# Patient Record
Sex: MALE | Race: WHITE | NOT HISPANIC OR LATINO | Employment: FULL TIME | URBAN - METROPOLITAN AREA
[De-identification: names, ages, dates, MRNs, and addresses within clinical notes are randomized per-mention and may not be internally consistent; named-entity substitution may affect disease eponyms.]

---

## 2022-09-14 ENCOUNTER — OFFICE VISIT (OUTPATIENT)
Dept: FAMILY MEDICINE | Facility: CLINIC | Age: 62
End: 2022-09-14
Payer: COMMERCIAL

## 2022-09-14 ENCOUNTER — TELEPHONE (OUTPATIENT)
Dept: NURSING | Facility: CLINIC | Age: 62
End: 2022-09-14

## 2022-09-14 VITALS
SYSTOLIC BLOOD PRESSURE: 173 MMHG | WEIGHT: 315 LBS | TEMPERATURE: 98.8 F | OXYGEN SATURATION: 94 % | DIASTOLIC BLOOD PRESSURE: 96 MMHG | RESPIRATION RATE: 20 BRPM | HEART RATE: 55 BPM

## 2022-09-14 DIAGNOSIS — L72.3 INFECTED SEBACEOUS CYST: Primary | ICD-10-CM

## 2022-09-14 DIAGNOSIS — L08.9 INFECTED SEBACEOUS CYST: Primary | ICD-10-CM

## 2022-09-14 PROCEDURE — 10060 I&D ABSCESS SIMPLE/SINGLE: CPT | Performed by: FAMILY MEDICINE

## 2022-09-14 RX ORDER — AMLODIPINE BESYLATE 10 MG/1
TABLET ORAL
COMMUNITY
Start: 2022-05-02 | End: 2022-09-28

## 2022-09-14 RX ORDER — ATENOLOL 50 MG/1
TABLET ORAL
COMMUNITY
Start: 2022-08-08 | End: 2022-09-28

## 2022-09-14 RX ORDER — CEPHALEXIN 500 MG/1
500 TABLET ORAL 3 TIMES DAILY
Qty: 21 TABLET | Refills: 0 | Status: SHIPPED | OUTPATIENT
Start: 2022-09-14 | End: 2022-09-21

## 2022-09-14 RX ORDER — ATORVASTATIN CALCIUM 80 MG/1
TABLET, FILM COATED ORAL
COMMUNITY
Start: 2022-09-06 | End: 2022-09-28

## 2022-09-14 RX ORDER — LISINOPRIL 40 MG/1
TABLET ORAL
COMMUNITY
Start: 2022-08-08 | End: 2022-09-28

## 2022-09-14 NOTE — PATIENT INSTRUCTIONS
Change bandage once a day over the next 3 to 4 days then as needed.  We will continue to drain.  You may discontinue dressing once drainage has slowed down and wound is healed.  Follow-up with worsening redness, swelling, fevers, chills, etc.

## 2022-09-14 NOTE — PROGRESS NOTES
ASSESSMENT:  Sebaceous cyst with abscess formation.  Scription given for cephalexin given the degree of erythema and induration.    PLAN:  After informed consent was obtained, using Betadine for cleansing   and 1% Lidocaine  with epinephrine for anesthetic, with sterile   technique, an incision was made into the abscess cavity which was   then drained of foul-smelling sebaceous material.  Very little purulence.  The cavity was irrigated.  Culture not obtained. Procedure well   tolerated.  Dressing applied and wound care instructions provided.       SUBJECTIVE:  61 year old male presents with abscess formation on his back for 5 or 6 days.  He is not diabetic.  No   fever or chills.     OBJECTIVE:  Fluctuant abscess noted on the upper back size 5 cm. There is   surrounding induration, erythema and tenderness. Afebrile.

## 2022-09-14 NOTE — TELEPHONE ENCOUNTER
Patient calling reports he has a sebaceous cyst on back that is itchy and tender. Requesting to know if there is an urgent care that could see him today. Reviewed the [place] walk in clinic with patient to go here today.     Raven Zapata RN 09/14/22 12:41 PM   [facility] Triage Nurse Advisor

## 2022-09-28 ENCOUNTER — OFFICE VISIT (OUTPATIENT)
Dept: FAMILY MEDICINE | Facility: CLINIC | Age: 62
End: 2022-09-28
Payer: COMMERCIAL

## 2022-09-28 VITALS
DIASTOLIC BLOOD PRESSURE: 100 MMHG | RESPIRATION RATE: 16 BRPM | OXYGEN SATURATION: 97 % | TEMPERATURE: 98.1 F | WEIGHT: 315 LBS | SYSTOLIC BLOOD PRESSURE: 164 MMHG | HEART RATE: 58 BPM

## 2022-09-28 DIAGNOSIS — Z11.59 NEED FOR HEPATITIS C SCREENING TEST: ICD-10-CM

## 2022-09-28 DIAGNOSIS — G89.29 CHRONIC PAIN OF RIGHT KNEE: ICD-10-CM

## 2022-09-28 DIAGNOSIS — H61.23 BILATERAL IMPACTED CERUMEN: ICD-10-CM

## 2022-09-28 DIAGNOSIS — I10 ESSENTIAL HYPERTENSION: ICD-10-CM

## 2022-09-28 DIAGNOSIS — Z13.220 SCREENING FOR HYPERLIPIDEMIA: ICD-10-CM

## 2022-09-28 DIAGNOSIS — L72.3 SEBACEOUS CYST: ICD-10-CM

## 2022-09-28 DIAGNOSIS — Z76.89 ESTABLISHING CARE WITH NEW DOCTOR, ENCOUNTER FOR: Primary | ICD-10-CM

## 2022-09-28 DIAGNOSIS — Z11.4 SCREENING FOR HIV (HUMAN IMMUNODEFICIENCY VIRUS): ICD-10-CM

## 2022-09-28 DIAGNOSIS — M25.561 CHRONIC PAIN OF RIGHT KNEE: ICD-10-CM

## 2022-09-28 DIAGNOSIS — E78.2 MIXED HYPERLIPIDEMIA: ICD-10-CM

## 2022-09-28 LAB
CHOLEST SERPL-MCNC: 180 MG/DL
ERYTHROCYTE [DISTWIDTH] IN BLOOD BY AUTOMATED COUNT: 13.9 % (ref 10–15)
HBA1C MFR BLD: 5.6 % (ref 0–5.6)
HCT VFR BLD AUTO: 40.8 % (ref 40–53)
HDLC SERPL-MCNC: 25 MG/DL
HGB BLD-MCNC: 14 G/DL (ref 13.3–17.7)
LDLC SERPL CALC-MCNC: 133 MG/DL
MCH RBC QN AUTO: 28.6 PG (ref 26.5–33)
MCHC RBC AUTO-ENTMCNC: 34.3 G/DL (ref 31.5–36.5)
MCV RBC AUTO: 83 FL (ref 78–100)
NONHDLC SERPL-MCNC: 155 MG/DL
PLATELET # BLD AUTO: 350 10E3/UL (ref 150–450)
PSA SERPL-MCNC: 0.5 NG/ML (ref 0–3.5)
RBC # BLD AUTO: 4.89 10E6/UL (ref 4.4–5.9)
TRIGL SERPL-MCNC: 110 MG/DL
WBC # BLD AUTO: 9.4 10E3/UL (ref 4–11)

## 2022-09-28 PROCEDURE — 87389 HIV-1 AG W/HIV-1&-2 AB AG IA: CPT

## 2022-09-28 PROCEDURE — G0103 PSA SCREENING: HCPCS

## 2022-09-28 PROCEDURE — 86803 HEPATITIS C AB TEST: CPT

## 2022-09-28 PROCEDURE — 83036 HEMOGLOBIN GLYCOSYLATED A1C: CPT

## 2022-09-28 PROCEDURE — 85027 COMPLETE CBC AUTOMATED: CPT

## 2022-09-28 PROCEDURE — 36415 COLL VENOUS BLD VENIPUNCTURE: CPT

## 2022-09-28 PROCEDURE — 80061 LIPID PANEL: CPT

## 2022-09-28 PROCEDURE — 69209 REMOVE IMPACTED EAR WAX UNI: CPT

## 2022-09-28 PROCEDURE — 99214 OFFICE O/P EST MOD 30 MIN: CPT | Mod: 25

## 2022-09-28 RX ORDER — AMLODIPINE BESYLATE 10 MG/1
10 TABLET ORAL DAILY
Qty: 90 TABLET | Refills: 3 | Status: SHIPPED | OUTPATIENT
Start: 2022-09-28 | End: 2024-06-14

## 2022-09-28 RX ORDER — ATENOLOL 50 MG/1
50 TABLET ORAL DAILY
Qty: 90 TABLET | Refills: 2 | Status: SHIPPED | OUTPATIENT
Start: 2022-09-28 | End: 2023-07-24

## 2022-09-28 RX ORDER — ATORVASTATIN CALCIUM 80 MG/1
80 TABLET, FILM COATED ORAL DAILY
Qty: 90 TABLET | Refills: 2 | Status: SHIPPED | OUTPATIENT
Start: 2022-09-28 | End: 2023-09-08

## 2022-09-28 RX ORDER — LISINOPRIL 40 MG/1
40 TABLET ORAL DAILY
Qty: 90 TABLET | Refills: 2 | Status: SHIPPED | OUTPATIENT
Start: 2022-09-28 | End: 2023-07-24

## 2022-09-28 ASSESSMENT — ENCOUNTER SYMPTOMS
ABDOMINAL DISTENTION: 0
FEVER: 0
PARESTHESIAS: 0
WHEEZING: 0
DYSURIA: 0
FATIGUE: 0
NUMBNESS: 0
WEAKNESS: 0
SHORTNESS OF BREATH: 0
ABDOMINAL PAIN: 0
HEMATURIA: 0
DIZZINESS: 0
HEMATOCHEZIA: 0
TREMORS: 0
CONSTIPATION: 0
VOMITING: 0
NAUSEA: 0
COUGH: 0
HEADACHES: 0
CONFUSION: 0
DIARRHEA: 0

## 2022-09-28 NOTE — PROGRESS NOTES
Assessment & Plan     Establishing care with a new doctor, encounter for  Patient has not had preventative care physical in over a year.  Previously followed with clinic in [place].  Will get request signed by patient for his records.  He is overdue for colonoscopy screening per our records but he reports he did the Cologuard 2 years ago I will watch for these in the records but notified patient if I do not see these he may need to do the Cologuard again and then could repeat in 3 years from this year.  He cannot remember the last time he had hemoglobin A1c check he has not been to the eye doctor in quite a few years and has not had any blood work completed in the last year.  I discussed risks and benefits of PSA screening with him and he would like to have this done today.  - CBC with platelets  - Hemoglobin A1c  - Adult Eye [facility] Referral  - PSA, screen  PSA within normal limits: 0.50 no further workup  Hemoglobin A1C: 5.6 no further work up but did include in support staff message to notify patient that prediabetes is 5.7 and the diet and exercise changes we talked about are very important to try and prevent prediabetes/diabetes in the future.     Screening for HIV (human immunodeficiency virus)  He does not ever recall having an HIV screening completed, will complete today.  - HIV Antigen Antibody Combo    Need for hepatitis C screening test  Does not recall having this screening completed will complete today.  - Hepatitis C Screen Reflex to HCV RNA Quant and Genotype    Mixed hyperlipidemia  Screening for hyperlipidemia  He is on atorvastatin 80 mg daily as prescribed by previous provider.  He reports he does not know the last time he had his lipids checked.  We will check those today.  He gets minimal exercise and does not have a lot of fruits and vegetables in his diet.  We discussed trying to get walking more and trying to make better food choices.   - Lipid panel reflex to direct LDL  Non-fasting  Lipid Results:  Recent Labs   Lab Test 09/28/22  1623   CHOL 180   HDL 25*   *   TRIG 110     The 10-year ASCVD risk score (Sanjana TRUONG Jr., et al., 2013) is: 20%    Values used to calculate the score:      Age: 61 years      Sex: Male      Is Non- : No      Diabetic: No      Tobacco smoker: No      Systolic Blood Pressure: 164 mmHg      Is BP treated: Yes      HDL Cholesterol: 25 mg/dL      Total Cholesterol: 180 mg/dL    Essential hypertension  Previously diagnosed by outside provider today his blood pressure is 164/101, 160/98 on recheck.  He does note he ran out of his amlodipine 2 weeks ago and has not been taking it.  He is not having headaches, no chest pain, no vision changes, or other signs of uncontrolled hypertension at this time.  I did place follow-up order for nurse only visit to recheck his blood pressure in 2 weeks.  He will start on the amlodipine today and we will not adjust medications if needed if his blood pressure is still elevated at that appointment.  - amLODIPine (NORVASC) 10 MG tablet  Dispense: 90 tablet; Refill: 3  - atenolol (TENORMIN) 50 MG tablet  Dispense: 90 tablet; Refill: 2  - atorvastatin (LIPITOR) 80 MG tablet  Dispense: 90 tablet; Refill: 2  - lisinopril (ZESTRIL) 40 MG tablet  Dispense: 90 tablet; Refill: 2    Chronic pain of right knee  Patient reports he has osteoarthritis of the right knee that he takes daily ibuprofen and Tylenol around-the-clock for.  He has not had imaging for several years and does not wish to pursue any treatment options at this time.  However I discussed that taking ibuprofen around-the-clock every day has risks and there are less invasive options besides surgery such as steroid injections he could try.  He is willing to try this but would like to get another image of the knee to see if the osteoarthritis has progressed.   - XR Knee Right 3 Views    Bilateral impacted cerumen  On exam noted impacted cerumen  bilaterally.  Unable to see tympanic membranes.   -LA REMOVAL IMPACTED CERUMEN/LVG UNILATERAL  Ears were irrigated by support staff at clinic. I examined the canals post irrigation. After removal impacted cerumen there is still a bit of cerumen in the canals bilaterally. The right TM can still not be visualized. Advised patient to put a few drops of mineral oil in his ear before showering and not to use Q-tips.     Nicotine/Tobacco Cessation:  He reports that he has never smoked. His smokeless tobacco use includes chew.  Nicotine/Tobacco Cessation Plan:   Information offered: Patient not interested at this time    Return in about 1 year (around 9/28/2023) for Routine preventive.    THANIA Wright CNP  [facility] is a 61 year old, presenting for the following health issues:  Establish Care ([facility]. Moved to [facility] in 2020. Medication check )    Patient presents today as he has not seen a medical provider in over a year and would like to establish care.  He has no specific concerns or complaints at this time.  He takes all his medications daily though he ran out of amlodipine a few weeks ago.  He tolerates his medications well.  I went over family history, personal medical history, and surgical history with patient.    History of Present Illness       Reason for visit:  Establish care, medicine check    He eats 0-1 servings of fruits and vegetables daily.He consumes 1 sweetened beverage(s) daily.He exercises with enough effort to increase his heart rate 9 or less minutes per day.  He exercises with enough effort to increase his heart rate 3 or less days per week.   He is taking medications regularly.     Home life: Lives with spouse, two adult boys that live close.   Occupation:[employer] worker.  Sexually active: Yes, one partner.   Safety concerns: Feels safe at home and at work.   Diet/exercise: He admits he does not exercise. He  "walks and lifts things at work. He doesn't have a great diet, he eats at home most of the time. He doesn't get a lot of fruits and veggies.     Family history of cancers: Grandfather, unknown type,  at an older age.   Eye exam/dental exam: Eye exam has been a while, dental exam \"not too long ago\"  Alcohol use: No  Tobacco use/marijuana use/drug use: He does not smoke, but uses chewing tobacco.   Mental health:No concerns.      Hyperlipidemia Follow-Up      Are you regularly taking any medication or supplement to lower your cholesterol?   Yes- daily    Are you having muscle aches or other side effects that you think could be caused by your cholesterol lowering medication?  No    Hypertension Follow-up      Do you check your blood pressure regularly outside of the clinic? No     Are you following a low salt diet? No    Are your blood pressures ever more than 140 on the top number (systolic) OR more   than 90 on the bottom number (diastolic), for example 140/90? Yes    Review of Systems   Constitutional: Negative for fatigue and fever.   Respiratory: Negative for cough, shortness of breath and wheezing.    Cardiovascular: Positive for peripheral edema.   Gastrointestinal: Negative for abdominal distention, abdominal pain, constipation, diarrhea, hematochezia, nausea and vomiting.   Endocrine: Negative for cold intolerance and heat intolerance.   Genitourinary: Negative for dysuria and hematuria.   Skin:        Sebaceous cysts on his back   Neurological: Negative for dizziness, tremors, syncope, weakness, numbness, headaches and paresthesias.   Psychiatric/Behavioral: Negative for confusion and suicidal ideas.         Objective    BP (!) 164/100 (BP Location: Right arm, Patient Position: Sitting, Cuff Size: Adult Large)   Pulse 58   Temp 98.1  F (36.7  C) (Oral)   Resp 16   Wt (!) 176.2 kg (388 lb 6.4 oz)   SpO2 97%   There is no height or weight on file to calculate BMI.  Physical Exam  Constitutional:       " General: He is not in acute distress.     Appearance: He is obese. He is not ill-appearing.   HENT:      Right Ear: There is impacted cerumen.      Left Ear: There is impacted cerumen.   Eyes:      Extraocular Movements: Extraocular movements intact.      Pupils: Pupils are equal, round, and reactive to light.   Cardiovascular:      Rate and Rhythm: Normal rate and regular rhythm.      Heart sounds: Normal heart sounds.   Pulmonary:      Effort: Pulmonary effort is normal.      Breath sounds: Normal breath sounds. No stridor. No wheezing or rhonchi.   Abdominal:      General: Bowel sounds are normal.      Palpations: Abdomen is soft. There is no mass.      Tenderness: There is no abdominal tenderness. There is no right CVA tenderness or left CVA tenderness.   Musculoskeletal:         General: No tenderness. Normal range of motion.      Right lower leg: No edema.      Left lower leg: No edema.   Lymphadenopathy:      Cervical: No cervical adenopathy.   Skin:     Capillary Refill: Capillary refill takes less than 2 seconds.      Comments: Two sebaceous cysts to mid-lower back. Numerous skin tags.    Neurological:      General: No focal deficit present.      Mental Status: He is alert and oriented to person, place, and time.      Cranial Nerves: No cranial nerve deficit.      Sensory: No sensory deficit.      Motor: No weakness.      Coordination: Coordination normal.      Gait: Gait normal.   Psychiatric:         Mood and Affect: Mood normal.         Behavior: Behavior normal.         Thought Content: Thought content normal.         Judgment: Judgment normal.        At the end of the visit, I confirmed understanding of what was discussed. Patient has not further questions or concerns that were brought up at this time.     Myah Mcmahan, JEANNINE, APRN, FNP-C

## 2022-09-29 ENCOUNTER — HOSPITAL ENCOUNTER (OUTPATIENT)
Dept: GENERAL RADIOLOGY | Facility: HOSPITAL | Age: 62
Discharge: HOME | End: 2022-09-29
Payer: COMMERCIAL

## 2022-09-29 ENCOUNTER — TELEPHONE (OUTPATIENT)
Dept: FAMILY MEDICINE | Facility: CLINIC | Age: 62
End: 2022-09-29

## 2022-09-29 DIAGNOSIS — M25.561 CHRONIC PAIN OF RIGHT KNEE: ICD-10-CM

## 2022-09-29 DIAGNOSIS — G89.29 CHRONIC PAIN OF RIGHT KNEE: ICD-10-CM

## 2022-09-29 DIAGNOSIS — Z76.89 ESTABLISHING CARE WITH NEW DOCTOR, ENCOUNTER FOR: ICD-10-CM

## 2022-09-29 LAB
HCV AB SERPL QL IA: NONREACTIVE
HIV 1+2 AB+HIV1 P24 AG SERPL QL IA: NONREACTIVE

## 2022-09-29 PROCEDURE — 73562 X-RAY EXAM OF KNEE 3: CPT | Mod: RT,FY

## 2022-09-29 NOTE — TELEPHONE ENCOUNTER
Patient Returning Call    Reason for call:  Missed phone call - test results    Information relayed to patient:  Relayed test results message from Dr. Myah Mcmahan.    Patient has additional questions:  No

## 2022-09-29 NOTE — TELEPHONE ENCOUNTER
----- Message from THANIA Cherry CNP sent at 9/29/2022  1:45 PM CDT -----  Team - please call patient with results.    Michell him know that his HIV and Hepatitis C screenings were negative. This is a one time screening completed on everyone.

## 2022-09-29 NOTE — TELEPHONE ENCOUNTER
----- Message from THANIA Cherry CNP sent at 9/28/2022  8:31 PM CDT -----  Please call x2 and send letter if you do not reach.     Please tell Stephen that his LDL (bad cholesterol) is 133, the goal would be less than 130. His HDL (good cholesterol) is 25 and the goal is above 40. His cholesterol is 180 which is normal and his triglycerides are 110, also normal. He can lower his LDL by avoiding red meat, butter, fried foods, cheese, or foods high in saturated fat. We do have a tool that looks at blood pressure, cholesterol levels, and other factors and give us a risk level of the chance of you developing cardiovascular disease (heart disease) and your result is 20%, which is considered high risk. Since you are on the highest dose of statin therapy at this time, I again can't emphasize enough how much starting to get more exercise and eating healthier will benefit you. Especially with your family history.     His hemoglobin A1C was also 5.6. This is the test we use to screen for diabetes. He does not have diabetes based on this number, but prediabetes is when the A1C is 5.7. The importance of starting walking more, getting daily exercise, and choosing better eating habits starting now will help lessen his chances of him developing prediabetes or diabetes in the future.     The screening for prostate cancer with within normal limits, so no further workup is needed at this time.     You blood counts all were perfect and no concerns there.     Let me know if you have any other questions,    Myah Mcmahan

## 2022-09-29 NOTE — RESULT ENCOUNTER NOTE
Please call x2 and send letter if you do not reach.     Please tell Stephen that his LDL (bad cholesterol) is 133, the goal would be less than 130. His HDL (good cholesterol) is 25 and the goal is above 40. His cholesterol is 180 which is normal and his triglycerides are 110, also normal. He can lower his LDL by avoiding red meat, butter, fried foods, cheese, or foods high in saturated fat. We do have a tool that looks at blood pressure, cholesterol levels, and other factors and give us a risk level of the chance of you developing cardiovascular disease (heart disease) and your result is 20%, which is considered high risk. Since you are on the highest dose of statin therapy at this time, I again can't emphasize enough how much starting to get more exercise and eating healthier will benefit you. Especially with your family history.     His hemoglobin A1C was also 5.6. This is the test we use to screen for diabetes. He does not have diabetes based on this number, but prediabetes is when the A1C is 5.7. The importance of starting walking more, getting daily exercise, and choosing better eating habits starting now will help lessen his chances of him developing prediabetes or diabetes in the future.     The screening for prostate cancer with within normal limits, so no further workup is needed at this time.     You blood counts all were perfect and no concerns there.     Let me know if you have any other questions,    Myah Mcmahan

## 2022-09-29 NOTE — TELEPHONE ENCOUNTER
Called patient and left a generic message for him to return phone call. Patient needs to be informed of the results/message below. Will try again later.

## 2022-09-30 ENCOUNTER — TELEPHONE (OUTPATIENT)
Dept: FAMILY MEDICINE | Facility: CLINIC | Age: 62
End: 2022-09-30

## 2022-09-30 NOTE — TELEPHONE ENCOUNTER
----- Message from THANIA Cherry CNP sent at 9/30/2022  8:07 AM CDT -----  Please call (I already tried to call once with no answer) and if no response letter:    Stephen,     Your x-ray shows advanced degenerative changes in the medial compartment (the inside of your right knee) and moderate degenerative changes to the lateral patellofemoral compartments. Basically, you do have osteoarthritis in that knee, which we already knew. I can't see your previous imaging to determine the progression of the arthritis, but the nature of arthritis is that it progresses. Since you are having more pain we can assume this image is probably worse than prior.  If you want to discuss doing a steroid injection in more detail, you can make an appointment with me. We could do the injection that same day if you decide you would like to try it.     Best,     Myah Mcmahan

## 2022-10-05 NOTE — TELEPHONE ENCOUNTER
Called patient and left a generic message for him to return phone call. If he returns call please inform him of his normal results below.

## 2022-10-07 ENCOUNTER — ALLIED HEALTH/NURSE VISIT (OUTPATIENT)
Dept: FAMILY MEDICINE | Facility: CLINIC | Age: 62
End: 2022-10-07
Payer: COMMERCIAL

## 2022-10-07 VITALS — SYSTOLIC BLOOD PRESSURE: 167 MMHG | HEART RATE: 56 BPM | DIASTOLIC BLOOD PRESSURE: 96 MMHG

## 2022-10-07 DIAGNOSIS — Z01.30 BLOOD PRESSURE CHECK: Primary | ICD-10-CM

## 2022-10-07 PROCEDURE — 99207 PR NO CHARGE NURSE ONLY: CPT

## 2022-10-07 NOTE — PROGRESS NOTES
Attempted to call patient. Unable to leave a voicemail. Please try to call patient again. See PCP's note below.

## 2022-10-07 NOTE — PROGRESS NOTES
I met with Stephen Vyas at the request of Mesha Mcmahan to recheck his blood pressure.  Blood pressure medications on the med list were reviewed with patient.    Patient has taken all medications as per usual regimen: Yes  Patient reports tolerating them without any issues or concerns: Yes    Vitals:    10/07/22 0844 10/07/22 0846   BP: (!) 170/99 (!) 167/96   BP Location: Right arm Right arm   Patient Position: Sitting Sitting   Cuff Size: Adult Large Adult Large   Pulse: 59 56       After 5 minutes, the patient's blood pressure remained greater than or equal to 140/90.    Is the patient currently having any chest pain? No  Does the patient currently have a headache? No  Does the patient currently have any vision changes? No  Does the patient currently have any nausea? No  Does the patient currently have any abdominal pain? No    The previous encounter was reviewed.  The patient was discharged and the note will be sent to the provider for final review.     Patient took BP medicine 30 minutes prior to coming.

## 2022-10-07 NOTE — PROGRESS NOTES
Can we call patient and see if he can do a phone visit with me this afternoon? If he is available we can put him in one of my blocked spots.   His BP was still high so we need to discuss medications changes.

## 2022-10-24 ENCOUNTER — OFFICE VISIT (OUTPATIENT)
Dept: OPHTHALMOLOGY | Facility: CLINIC | Age: 62
End: 2022-10-24
Payer: COMMERCIAL

## 2022-10-24 DIAGNOSIS — Z76.89 ESTABLISHING CARE WITH NEW DOCTOR, ENCOUNTER FOR: ICD-10-CM

## 2022-10-24 DIAGNOSIS — L71.9 ROSACEA: ICD-10-CM

## 2022-10-24 DIAGNOSIS — H52.4 PRESBYOPIA: ICD-10-CM

## 2022-10-24 DIAGNOSIS — Z01.00 EXAMINATION OF EYES AND VISION: Primary | ICD-10-CM

## 2022-10-24 PROCEDURE — 92004 COMPRE OPH EXAM NEW PT 1/>: CPT | Performed by: OPHTHALMOLOGY

## 2022-10-24 PROCEDURE — 92015 DETERMINE REFRACTIVE STATE: CPT | Performed by: OPHTHALMOLOGY

## 2022-10-24 ASSESSMENT — VISUAL ACUITY
OS_CC: 20/25
OS_CC+: -1
METHOD: SNELLEN - LINEAR
OD_CC+: +2
CORRECTION_TYPE: GLASSES
OD_CC: 20/40

## 2022-10-24 ASSESSMENT — REFRACTION_WEARINGRX
OD_CYLINDER: +0.50
OS_ADD: +2.00
OD_SPHERE: -5.00
OD_ADD: +2.00
OS_AXIS: 037
OS_CYLINDER: +1.00
SPECS_TYPE: PAL
OD_AXIS: 110
OS_SPHERE: -5.25

## 2022-10-24 ASSESSMENT — SLIT LAMP EXAM - LIDS
COMMENTS: 1+ SCLERAL SHOW, 1+ DERMATOCHALASIS
COMMENTS: 1+ SCLERAL SHOW, 1+ DERMATOCHALASIS

## 2022-10-24 ASSESSMENT — EXTERNAL EXAM - RIGHT EYE: OD_EXAM: ROSACEA

## 2022-10-24 ASSESSMENT — REFRACTION_MANIFEST
OS_SPHERE: -4.00
OD_CYLINDER: +0.50
OS_CYLINDER: +0.75
OD_ADD: +2.50
OD_AXIS: 165
OD_SPHERE: -3.75
OS_ADD: +2.50
OS_AXIS: 040

## 2022-10-24 ASSESSMENT — CONF VISUAL FIELD
OD_INFERIOR_TEMPORAL_RESTRICTION: 0
OD_SUPERIOR_NASAL_RESTRICTION: 0
OS_INFERIOR_NASAL_RESTRICTION: 0
OD_INFERIOR_NASAL_RESTRICTION: 0
OS_SUPERIOR_NASAL_RESTRICTION: 0
OD_SUPERIOR_TEMPORAL_RESTRICTION: 0
OS_INFERIOR_TEMPORAL_RESTRICTION: 0
OS_NORMAL: 1
OD_NORMAL: 1
OS_SUPERIOR_TEMPORAL_RESTRICTION: 0

## 2022-10-24 ASSESSMENT — TONOMETRY
IOP_METHOD: APPLANATION
OS_IOP_MMHG: 21
OD_IOP_MMHG: 21

## 2022-10-24 ASSESSMENT — CUP TO DISC RATIO
OD_RATIO: 0.3
OS_RATIO: 0.2

## 2022-10-24 ASSESSMENT — EXTERNAL EXAM - LEFT EYE: OS_EXAM: ROSACEA

## 2022-10-24 NOTE — PROGRESS NOTES
Current Eye Medications: none      Subjective:  Here for complete eye exam today. Has been a long time since last exam with new glasses. Coating on the glasses is also coming off. Takes off the glasses to read.      Objective:  See Ophthalmology Exam.       Assessment:  Baseline eye exam.       ICD-10-CM    1. Examination of eyes and vision  Z01.00       2. Presbyopia  H52.4       3. Rosacea  L71.9       4. Establishing care with new doctor, encounter for  Z76.89 Adult Eye  Referral           Plan:  Glasses Rx given - optional  May use artificial tears up to 4 times daily both eyes.  (Refresh Tears, Systane Ultra/Balance, or Theratears)   Possible posterior vitreous detachment (sudden onset large floater and/or flashing lights) both eyes discussed.   Call in June 2023 for an appointment in October 2023 for Complete Exam    Dr. Rodríguez [phone]

## 2022-10-24 NOTE — PATIENT INSTRUCTIONS
Glasses Rx given - optional  May use artificial tears up to 4 times daily both eyes.  (Refresh Tears, Systane Ultra/Balance, or Theratears)   Possible posterior vitreous detachment (sudden onset large floater and/or flashing lights) both eyes discussed.   Call in June 2023 for an appointment in October 2023 for Complete Exam    Dr. Rodríguez [phone]

## 2022-10-24 NOTE — LETTER
10/24/2022         RE: Stephen Vyas  [id number] [facility] [id number]        Dear Colleague,    Thank you for referring your patient, Stephen Vyas, to the [facility]. Please see a copy of my visit note below.     Current Eye Medications: none      Subjective:  Here for complete eye exam today. Has been a long time since last exam with new glasses. Coating on the glasses is also coming off. Takes off the glasses to read.      Objective:  See Ophthalmology Exam.       Assessment:  Baseline eye exam.       ICD-10-CM    1. Examination of eyes and vision  Z01.00       2. Presbyopia  H52.4       3. Rosacea  L71.9       4. Establishing care with new doctor, encounter for  Z76.89 Adult Eye  Referral           Plan:  Glasses Rx given - optional  May use artificial tears up to 4 times daily both eyes.  (Refresh Tears, Systane Ultra/Balance, or Theratears)   Possible posterior vitreous detachment (sudden onset large floater and/or flashing lights) both eyes discussed.   Call in June 2023 for an appointment in October 2023 for Complete Exam    Dr. Rodríguez [phone]          Again, thank you for allowing me to participate in the care of your patient.        Sincerely,        Rl Rodríguez MD

## 2022-11-08 ENCOUNTER — OFFICE VISIT (OUTPATIENT)
Dept: FAMILY MEDICINE | Facility: CLINIC | Age: 62
End: 2022-11-08
Payer: COMMERCIAL

## 2022-11-08 VITALS
WEIGHT: 315 LBS | HEIGHT: 72 IN | RESPIRATION RATE: 24 BRPM | BODY MASS INDEX: 42.66 KG/M2 | OXYGEN SATURATION: 96 % | TEMPERATURE: 97.8 F | HEART RATE: 56 BPM | SYSTOLIC BLOOD PRESSURE: 142 MMHG | DIASTOLIC BLOOD PRESSURE: 82 MMHG

## 2022-11-08 DIAGNOSIS — Z12.11 SCREEN FOR COLON CANCER: Primary | ICD-10-CM

## 2022-11-08 DIAGNOSIS — M17.11 PRIMARY OSTEOARTHRITIS OF RIGHT KNEE: ICD-10-CM

## 2022-11-08 PROCEDURE — 20610 DRAIN/INJ JOINT/BURSA W/O US: CPT | Mod: RT

## 2022-11-08 RX ORDER — TRIAMCINOLONE ACETONIDE 40 MG/ML
40 INJECTION, SUSPENSION INTRA-ARTICULAR; INTRAMUSCULAR ONCE
Status: COMPLETED | OUTPATIENT
Start: 2022-11-08 | End: 2022-11-08

## 2022-11-08 RX ADMIN — TRIAMCINOLONE ACETONIDE 40 MG: 40 INJECTION, SUSPENSION INTRA-ARTICULAR; INTRAMUSCULAR at 13:40

## 2022-11-08 ASSESSMENT — PAIN SCALES - GENERAL: PAINLEVEL: MILD PAIN (2)

## 2022-11-08 NOTE — PATIENT INSTRUCTIONS
Monitor for s/s of infection including redness and swelling that develop and don't go away. If you develop a fever I want you to let me know. You can take Acetaminophen if you need for pain over the next day or two, but if there is pain for longer than 48 hours I want to know about it. You should get all the benefits from the steroid in the next 72 hours, but give it a good ten days to see the full effect.     If you have any questions or concerns please feel free to contact me.

## 2022-12-05 ENCOUNTER — TRANSFERRED RECORDS (OUTPATIENT)
Dept: HEALTH INFORMATION MANAGEMENT | Facility: CLINIC | Age: 62
End: 2022-12-05

## 2023-01-23 ENCOUNTER — TRANSFERRED RECORDS (OUTPATIENT)
Dept: HEALTH INFORMATION MANAGEMENT | Facility: CLINIC | Age: 63
End: 2023-01-23
Payer: COMMERCIAL

## 2023-05-11 ENCOUNTER — LAB (OUTPATIENT)
Dept: FAMILY MEDICINE | Facility: CLINIC | Age: 63
End: 2023-05-11

## 2023-05-11 ENCOUNTER — OFFICE VISIT (OUTPATIENT)
Dept: FAMILY MEDICINE | Facility: CLINIC | Age: 63
End: 2023-05-11
Payer: COMMERCIAL

## 2023-05-11 VITALS
WEIGHT: 315 LBS | OXYGEN SATURATION: 94 % | RESPIRATION RATE: 18 BRPM | HEART RATE: 67 BPM | TEMPERATURE: 97.9 F | HEIGHT: 72 IN | BODY MASS INDEX: 42.66 KG/M2 | DIASTOLIC BLOOD PRESSURE: 76 MMHG | SYSTOLIC BLOOD PRESSURE: 128 MMHG

## 2023-05-11 DIAGNOSIS — E66.01 MORBID OBESITY (H): ICD-10-CM

## 2023-05-11 DIAGNOSIS — Z12.11 COLON CANCER SCREENING: ICD-10-CM

## 2023-05-11 DIAGNOSIS — E87.6 HYPOKALEMIA: Primary | ICD-10-CM

## 2023-05-11 DIAGNOSIS — M17.11 PRIMARY OSTEOARTHRITIS OF RIGHT KNEE: Primary | ICD-10-CM

## 2023-05-11 LAB
ALBUMIN SERPL BCG-MCNC: 4.1 G/DL (ref 3.5–5.2)
ALP SERPL-CCNC: 107 U/L (ref 40–129)
ALT SERPL W P-5'-P-CCNC: 46 U/L (ref 10–50)
ANION GAP SERPL CALCULATED.3IONS-SCNC: 16 MMOL/L (ref 7–15)
AST SERPL W P-5'-P-CCNC: 34 U/L (ref 10–50)
BILIRUB SERPL-MCNC: 0.6 MG/DL
BUN SERPL-MCNC: 16.4 MG/DL (ref 8–23)
CALCIUM SERPL-MCNC: 9 MG/DL (ref 8.8–10.2)
CHLORIDE SERPL-SCNC: 97 MMOL/L (ref 98–107)
CREAT SERPL-MCNC: 1.33 MG/DL (ref 0.67–1.17)
DEPRECATED HCO3 PLAS-SCNC: 30 MMOL/L (ref 22–29)
GFR SERPL CREATININE-BSD FRML MDRD: 61 ML/MIN/1.73M2
GLUCOSE SERPL-MCNC: 194 MG/DL (ref 70–99)
POTASSIUM SERPL-SCNC: 2.9 MMOL/L (ref 3.4–5.3)
PROT SERPL-MCNC: 7.4 G/DL (ref 6.4–8.3)
SODIUM SERPL-SCNC: 143 MMOL/L (ref 136–145)

## 2023-05-11 PROCEDURE — 80053 COMPREHEN METABOLIC PANEL: CPT

## 2023-05-11 PROCEDURE — 99214 OFFICE O/P EST MOD 30 MIN: CPT

## 2023-05-11 PROCEDURE — 36415 COLL VENOUS BLD VENIPUNCTURE: CPT

## 2023-05-11 RX ORDER — POTASSIUM CHLORIDE 1500 MG/1
20 TABLET, EXTENDED RELEASE ORAL 2 TIMES DAILY
Qty: 10 TABLET | Refills: 0 | Status: SHIPPED | OUTPATIENT
Start: 2023-05-11 | End: 2023-05-16

## 2023-05-11 RX ORDER — MELOXICAM 7.5 MG/1
7.5 TABLET ORAL DAILY
Qty: 30 TABLET | Refills: 0 | Status: SHIPPED | OUTPATIENT
Start: 2023-05-11 | End: 2023-07-07

## 2023-05-11 ASSESSMENT — PAIN SCALES - GENERAL: PAINLEVEL: MILD PAIN (2)

## 2023-05-11 NOTE — PROGRESS NOTES
Assessment & Plan     Primary osteoarthritis of right knee  We will start Mobic 7.5 mg daily.  I discussed the risks of long-term use of this medication with patient.  We will use it short-term to see if there is any benefit prior to his conference he has to go to.  I would prefer he see orthopedics for future management following the trip.  We will get him into weight management as described below prior to seeing Ortho, because if patient is going to have surgery it would be best if we could try and get some weight loss.  I will wait to place the orthopedics referral until I hear from patient on if he would like to see them, I did discuss that they could also try a knee injection or hyaluronic acid injections to see if there is benefit.  He will reach out if he wants the [facility] referral or he will go to [place] or [facility].  No effusions on palpation of the knee today.  Patient is afebrile.  - meloxicam (MOBIC) 7.5 MG tablet; Take 1 tablet (7.5 mg) by mouth daily  - Comprehensive metabolic panel (BMP + Alb, Alk Phos, ALT, AST, Total. Bili, TP); Future    Morbid obesity (H)  BMI 53.44.  Patient reports no matter what he tries including healthy diet and exercise (which she admits is limited due to the knee pain) he has not had success with weight loss at all.  He was interested in medical management.  We will refer him to the weight loss clinic.  Likely weight loss will benefit chronic knee pain.   - Adult Comprehensive Weight Management  Referral; Future    Colon cancer screening  Lost previous Cologuard.  Reordered.  - COLOGUARD([employer]); Future    BMI:   Estimated body mass index is 53.44 kg/m  as calculated from the following:    Height as of this encounter: 1.829 m (6').    Weight as of this encounter: 178.7 kg (394 lb).   Weight management plan: Patient referred to endocrine and/or weight management specialty      Janelle Mitchell is a 62 year old, presenting for the [facility]  issues:  RECHECK (Follow up/ knee pain )    Patient presents for knee pain. It is in his right knee. He had a joint injection in the past that did not help much. He is worried because he is going to a conference in June and going to be doing a lot of walking. He is wondering what we can do about it. The pain has remained the same, but he is having left hip pain now. He has not been able to lose any weight. He has not fallen, he has not noticed any swelling or erythema. No fevers.         5/11/2023    10:52 AM   Additional Questions   Roomed by krishna     History of Present Illness       Reason for visit:  Knee and hip pain    He eats 0-1 servings of fruits and vegetables daily.He consumes 0 sweetened beverage(s) daily.He exercises with enough effort to increase his heart rate 9 or less minutes per day.  He exercises with enough effort to increase his heart rate 3 or less days per week.   He is taking medications regularly.     Review of Systems   Constitutional: Positive for activity change (Due to pain, not as active. ). Negative for fatigue and fever.   Musculoskeletal: Positive for arthralgias and gait problem. Negative for back pain and joint swelling.   Neurological: Negative for weakness, numbness and paresthesias.            Objective    /76 (BP Location: Right arm, Patient Position: Sitting)   Pulse 67   Temp 97.9  F (36.6  C) (Oral)   Resp 18   Ht 1.829 m (6')   Wt (!) 178.7 kg (394 lb)   SpO2 94%   BMI 53.44 kg/m    Body mass index is 53.44 kg/m .  Physical Exam  Constitutional:       General: He is not in acute distress.  Pulmonary:      Effort: No respiratory distress.   Musculoskeletal:      Right knee: No bony tenderness. No tenderness.      Left knee: No bony tenderness. No tenderness.   Neurological:      General: No focal deficit present.      Mental Status: He is alert. Mental status is at baseline.   Psychiatric:         Mood and Affect: Mood normal.         Thought Content: Thought  content normal.        At the end of the visit, I confirmed understanding of what was discussed. Patient has no further questions or concerns that were brought up at this time.     Myah Mcmahan, JEANNINE, APRN, FNP-C

## 2023-05-12 ENCOUNTER — TELEPHONE (OUTPATIENT)
Dept: FAMILY MEDICINE | Facility: CLINIC | Age: 63
End: 2023-05-12
Payer: COMMERCIAL

## 2023-05-12 NOTE — TELEPHONE ENCOUNTER
Can we call patient and schedule him for an appointment with me on Wednesday of next week. It will be a follow-up on low potassium. You can use my lunch hour. If he absolutely can not come in Wednesday we can do my lunch/any open slot Thursday or Friday.

## 2023-05-12 NOTE — TELEPHONE ENCOUNTER
LMTCB- when pt calls back please assist in scheduling appointment with Mesha. Ok to override lunch hour/open slots next Wed-Friday (5/17/-5/19) KK 5/12/23 917am

## 2023-05-16 ASSESSMENT — ENCOUNTER SYMPTOMS
PARESTHESIAS: 0
JOINT SWELLING: 0
NUMBNESS: 0
BACK PAIN: 0
ARTHRALGIAS: 1
FEVER: 0
ACTIVITY CHANGE: 1
WEAKNESS: 0
FATIGUE: 0

## 2023-05-17 ENCOUNTER — OFFICE VISIT (OUTPATIENT)
Dept: FAMILY MEDICINE | Facility: CLINIC | Age: 63
End: 2023-05-17
Payer: COMMERCIAL

## 2023-05-17 VITALS
BODY MASS INDEX: 41.75 KG/M2 | WEIGHT: 315 LBS | HEIGHT: 73 IN | HEART RATE: 68 BPM | RESPIRATION RATE: 20 BRPM | TEMPERATURE: 98.2 F | DIASTOLIC BLOOD PRESSURE: 79 MMHG | SYSTOLIC BLOOD PRESSURE: 144 MMHG | OXYGEN SATURATION: 95 %

## 2023-05-17 DIAGNOSIS — E87.6 HYPOKALEMIA: Primary | ICD-10-CM

## 2023-05-17 LAB
FOLATE SERPL-MCNC: 10.5 NG/ML (ref 4.6–34.8)
MAGNESIUM SERPL-MCNC: 1.9 MG/DL (ref 1.7–2.3)
POTASSIUM SERPL-SCNC: 3.2 MMOL/L (ref 3.4–5.3)

## 2023-05-17 PROCEDURE — 93005 ELECTROCARDIOGRAM TRACING: CPT

## 2023-05-17 PROCEDURE — 99213 OFFICE O/P EST LOW 20 MIN: CPT

## 2023-05-17 PROCEDURE — 36415 COLL VENOUS BLD VENIPUNCTURE: CPT

## 2023-05-17 PROCEDURE — 82746 ASSAY OF FOLIC ACID SERUM: CPT

## 2023-05-17 PROCEDURE — 84132 ASSAY OF SERUM POTASSIUM: CPT

## 2023-05-17 PROCEDURE — 83735 ASSAY OF MAGNESIUM: CPT

## 2023-05-17 PROCEDURE — 93010 ELECTROCARDIOGRAM REPORT: CPT | Performed by: INTERNAL MEDICINE

## 2023-05-17 ASSESSMENT — ENCOUNTER SYMPTOMS
MYALGIAS: 0
ARTHRALGIAS: 1
NUMBNESS: 0
PARESTHESIAS: 0
FATIGUE: 0
CHILLS: 0
PALPITATIONS: 0

## 2023-05-17 ASSESSMENT — PAIN SCALES - GENERAL: PAINLEVEL: NO PAIN (0)

## 2023-05-17 NOTE — PROGRESS NOTES
"  Assessment & Plan     Hypokalemia  We will check EKG today, will check potassium today, folate, and magnesium.  He is not really on any medications that are expected to have a drop in potassium, but could be the amlodipine or lisinopril. If back to normal level, will need to recheck in about a week or so to see if it drops again.  Would require further work-up at that time if it does. Could consider chronic supplementation but would need to monitor for any hyperkalemia.   - EKG 12-lead, tracing only  - Potassium  - Magnesium  - Folate    Janelle Mitchell is a 62 year old, presenting for the following health issues:  RECHECK (Follow up/ labs )        5/17/2023    12:17 PM   Additional Questions   Roomed by krishna     History of Present Illness       Reason for visit:  Knee and hip pain    He eats 0-1 servings of fruits and vegetables daily.He consumes 0 sweetened beverage(s) daily.He exercises with enough effort to increase his heart rate 9 or less minutes per day.  He exercises with enough effort to increase his heart rate 3 or less days per week.   He is taking medications regularly.     Patient presents for follow-up at my request for low potassium.  He has been on supplementation since Friday.  We reviewed medications today.  No new medications.  No chest pain, no muscle pains or weakness, no heart palpitations, patient is feeling very well.    Mobic has not really helped with pain, he has taken 1 tablet twice now.  Still having significant right knee pain.    Review of Systems   Constitutional: Negative for chills and fatigue.   Cardiovascular: Negative for chest pain and palpitations.   Musculoskeletal: Positive for arthralgias. Negative for myalgias.   Neurological: Negative for numbness and paresthesias.         Objective    BP (!) 144/79 (BP Location: Right arm, Patient Position: Sitting)   Pulse 68   Temp 98.2  F (36.8  C) (Oral)   Resp 20   Ht 6' 1\" (1.854 m)   Wt (!) 393 lb (178.3 kg)   SpO2 95%  "  BMI 51.85 kg/m    Body mass index is 51.85 kg/m .  Physical Exam  Constitutional:       General: He is not in acute distress.  Cardiovascular:      Rate and Rhythm: Normal rate and regular rhythm.      Pulses: Normal pulses.      Heart sounds: Normal heart sounds. No murmur heard.  Pulmonary:      Effort: No respiratory distress.   Neurological:      General: No focal deficit present.      Mental Status: He is alert. Mental status is at baseline.   Psychiatric:         Mood and Affect: Mood normal.         Thought Content: Thought content normal.      At the end of the visit, I confirmed understanding of what was discussed. Patient has no further questions or concerns that were brought up at this time.     Myah Mcmahan, JEANNINE, APRN, FNP-C

## 2023-05-18 DIAGNOSIS — E87.6 HYPOKALEMIA: Primary | ICD-10-CM

## 2023-05-18 RX ORDER — POTASSIUM CHLORIDE 1500 MG/1
20 TABLET, EXTENDED RELEASE ORAL 2 TIMES DAILY
Qty: 24 TABLET | Refills: 0 | Status: SHIPPED | OUTPATIENT
Start: 2023-05-18 | End: 2023-07-07

## 2023-05-19 DIAGNOSIS — R94.31 ELECTROCARDIOGRAM SHOWING T WAVE ABNORMALITIES: ICD-10-CM

## 2023-05-19 DIAGNOSIS — R94.31 QT PROLONGATION: Primary | ICD-10-CM

## 2023-05-19 LAB
ATRIAL RATE - MUSE: 62 BPM
DIASTOLIC BLOOD PRESSURE - MUSE: NORMAL MMHG
INTERPRETATION ECG - MUSE: NORMAL
P AXIS - MUSE: 43 DEGREES
PR INTERVAL - MUSE: 234 MS
QRS DURATION - MUSE: 110 MS
QT - MUSE: 472 MS
QTC - MUSE: 479 MS
R AXIS - MUSE: 3 DEGREES
SYSTOLIC BLOOD PRESSURE - MUSE: NORMAL MMHG
T AXIS - MUSE: 183 DEGREES
VENTRICULAR RATE- MUSE: 62 BPM

## 2023-05-19 NOTE — PROGRESS NOTES
Called and discussed with patient. I am going to have him get his potassium rechecked next week and get another EKG.     Please call patient to set up lab-only appointment for potassium recheck and a MA visit for an EKG. I would like these to be done on Wednesday (5/24/2023) or Thursday (5/25/2023). Orders have been placed.      I want to get the EKG to see if it looks similar to this reading. I have also placed an order for an ECHOcardiogram. I confirmed that patient is having no chest pain,  SOB, AIKEN, or peripheral edema. I want to make sure potassium is stable as we are starting supplementation and he has the prolonged QT.     If he were to develop chest pain I would want him to be seen urgently. He verbalized understanding.

## 2023-05-24 ENCOUNTER — LAB (OUTPATIENT)
Dept: LAB | Facility: CLINIC | Age: 63
End: 2023-05-24
Payer: COMMERCIAL

## 2023-05-24 DIAGNOSIS — E87.6 HYPOKALEMIA: ICD-10-CM

## 2023-05-24 LAB — POTASSIUM SERPL-SCNC: 3.6 MMOL/L (ref 3.4–5.3)

## 2023-05-24 PROCEDURE — 84132 ASSAY OF SERUM POTASSIUM: CPT

## 2023-05-24 PROCEDURE — 36415 COLL VENOUS BLD VENIPUNCTURE: CPT

## 2023-05-30 ENCOUNTER — APPOINTMENT (OUTPATIENT)
Dept: LAB | Facility: CLINIC | Age: 63
End: 2023-05-30
Payer: COMMERCIAL

## 2023-06-07 DIAGNOSIS — R94.31 QT PROLONGATION: Primary | ICD-10-CM

## 2023-06-08 DIAGNOSIS — E66.01 MORBID OBESITY (H): Primary | ICD-10-CM

## 2023-06-09 ENCOUNTER — HOSPITAL ENCOUNTER (OUTPATIENT)
Dept: CARDIOLOGY | Facility: HOSPITAL | Age: 63
Discharge: HOME | End: 2023-06-09
Payer: COMMERCIAL

## 2023-06-09 DIAGNOSIS — R94.31 QT PROLONGATION: ICD-10-CM

## 2023-06-09 PROCEDURE — 93225 XTRNL ECG REC<48 HRS REC: CPT

## 2023-06-13 PROCEDURE — 93227 XTRNL ECG REC<48 HR R&I: CPT | Performed by: INTERNAL MEDICINE

## 2023-06-21 ENCOUNTER — TELEPHONE (OUTPATIENT)
Dept: FAMILY MEDICINE | Facility: CLINIC | Age: 63
End: 2023-06-21
Payer: COMMERCIAL

## 2023-06-23 ENCOUNTER — TELEPHONE (OUTPATIENT)
Dept: FAMILY MEDICINE | Facility: CLINIC | Age: 63
End: 2023-06-23
Payer: COMMERCIAL

## 2023-06-26 ENCOUNTER — TELEPHONE (OUTPATIENT)
Dept: FAMILY MEDICINE | Facility: CLINIC | Age: 63
End: 2023-06-26
Payer: COMMERCIAL

## 2023-06-30 ENCOUNTER — TELEPHONE (OUTPATIENT)
Dept: FAMILY MEDICINE | Facility: CLINIC | Age: 63
End: 2023-06-30
Payer: COMMERCIAL

## 2023-06-30 NOTE — TELEPHONE ENCOUNTER
Can we please call patient and get him on my schedule for the next week or two. Okay to use override or lunch hour.

## 2023-07-03 NOTE — TELEPHONE ENCOUNTER
Patient Returning Call    Reason for call:  Returning Call to Clinic    Information relayed to patient:  Relayed message from PCP Mesha Mcmahan - patient accepted offer to schedule appt    Scheduled for 7/7/23    Patient has additional questions:  No

## 2023-07-07 ENCOUNTER — OFFICE VISIT (OUTPATIENT)
Dept: FAMILY MEDICINE | Facility: CLINIC | Age: 63
End: 2023-07-07
Payer: COMMERCIAL

## 2023-07-07 ENCOUNTER — TRANSFERRED RECORDS (OUTPATIENT)
Dept: HEALTH INFORMATION MANAGEMENT | Facility: CLINIC | Age: 63
End: 2023-07-07

## 2023-07-07 VITALS
OXYGEN SATURATION: 96 % | SYSTOLIC BLOOD PRESSURE: 148 MMHG | WEIGHT: 315 LBS | HEIGHT: 73 IN | RESPIRATION RATE: 20 BRPM | HEART RATE: 61 BPM | BODY MASS INDEX: 41.75 KG/M2 | TEMPERATURE: 98.1 F | DIASTOLIC BLOOD PRESSURE: 85 MMHG

## 2023-07-07 DIAGNOSIS — I1A.0 RESISTANT HYPERTENSION: ICD-10-CM

## 2023-07-07 DIAGNOSIS — R94.31 PROLONGED QT INTERVAL: ICD-10-CM

## 2023-07-07 DIAGNOSIS — E87.6 HYPOKALEMIA: Primary | ICD-10-CM

## 2023-07-07 LAB
ALBUMIN SERPL BCG-MCNC: 4.1 G/DL (ref 3.5–5.2)
ALP SERPL-CCNC: 109 U/L (ref 40–129)
ALT SERPL W P-5'-P-CCNC: 44 U/L (ref 0–70)
ANION GAP SERPL CALCULATED.3IONS-SCNC: 11 MMOL/L (ref 7–15)
AST SERPL W P-5'-P-CCNC: 32 U/L (ref 0–45)
BILIRUB SERPL-MCNC: 0.6 MG/DL
BUN SERPL-MCNC: 15.8 MG/DL (ref 8–23)
CALCIUM SERPL-MCNC: 9.1 MG/DL (ref 8.8–10.2)
CHLORIDE SERPL-SCNC: 96 MMOL/L (ref 98–107)
CREAT SERPL-MCNC: 1.15 MG/DL (ref 0.67–1.17)
DEPRECATED HCO3 PLAS-SCNC: 35 MMOL/L (ref 22–29)
GFR SERPL CREATININE-BSD FRML MDRD: 73 ML/MIN/1.73M2
GLUCOSE SERPL-MCNC: 117 MG/DL (ref 70–99)
MAGNESIUM SERPL-MCNC: 2 MG/DL (ref 1.7–2.3)
POTASSIUM SERPL-SCNC: 3 MMOL/L (ref 3.4–5.3)
POTASSIUM UR-SCNC: 32.8 MMOL/L
PROT SERPL-MCNC: 7.4 G/DL (ref 6.4–8.3)
SODIUM SERPL-SCNC: 142 MMOL/L (ref 136–145)

## 2023-07-07 PROCEDURE — 93005 ELECTROCARDIOGRAM TRACING: CPT

## 2023-07-07 PROCEDURE — 93010 ELECTROCARDIOGRAM REPORT: CPT | Performed by: INTERNAL MEDICINE

## 2023-07-07 PROCEDURE — 99214 OFFICE O/P EST MOD 30 MIN: CPT

## 2023-07-07 PROCEDURE — 36415 COLL VENOUS BLD VENIPUNCTURE: CPT

## 2023-07-07 PROCEDURE — 82088 ASSAY OF ALDOSTERONE: CPT

## 2023-07-07 PROCEDURE — 83735 ASSAY OF MAGNESIUM: CPT

## 2023-07-07 PROCEDURE — 80053 COMPREHEN METABOLIC PANEL: CPT

## 2023-07-07 PROCEDURE — 84133 ASSAY OF URINE POTASSIUM: CPT

## 2023-07-07 ASSESSMENT — PAIN SCALES - GENERAL: PAINLEVEL: NO PAIN (0)

## 2023-07-07 NOTE — PROGRESS NOTES
Assessment & Plan     Hypokalemia  Chronic, over the past several years.  Did respond well to potassium supplementation in the past, but due to prolonged QT on EKG we were cautious with continuing supplementation without close monitoring.  Repeat EKG today, repeat potassium today, will check magnesium, will check potassium and urine.  Patient does not report any diarrhea that would indicate GI losses and again this is a chronic finding documented in the health record for the past 4 years.  If low potassium, could restart potassium supplementation at this time.  QT interval is no longer prolonged, will want to see patient for an MA visit for an EKG as well as potassium check in 2 weeks. Typically with amlodipine and lisinopril we would see potassium increase, so unclear if medication induced or potassium wasting syndrome, could consider nephrology consult.   - EKG 12-lead, tracing only  - Comprehensive metabolic panel (BMP + Alb, Alk Phos, ALT, AST, Total. Bili, TP)  - Magnesium  - Aldosterone  - Potassium random urine  - Spironolactone 25 mg daily  - Nephrology referral    Prolonged QT interval  Noted on previous EKG, potassium 3.2 at that time. Recheck today reveals normal QT interval. Persistent first degree heart block on last two EKG.  - EKG 12-lead, tracing only    Resistant hypertension  Patient on max dose of lisinopril, 50 mg of atenolol (with first degree AV block so hesitant to increase further), and max dose amlodipine. Persistent and chronic hypokalemia and blood pressure above goal for last few visits. Referral to cardiology for insight and recommendations for better management. Discussed DASH diet and increasing weight loss strategies with patient.   - Adult Cardiology [facility] Referral; Future  - Spironolactone 25 mg daily    Janelle Mitchell is a 62 year old, presenting for the following health issues:  RECHECK        7/7/2023    11:47 AM   Additional Questions   Roomed by krishna Avila  "of Present Illness       Hypertension: He presents for follow up of hypertension.  He does not check blood pressure  regularly outside of the clinic. Outside blood pressures have been over 140/90. He does not follow a low salt diet.     He eats 0-1 servings of fruits and vegetables daily.He consumes 1 sweetened beverage(s) daily.He exercises with enough effort to increase his heart rate 9 or less minutes per day.  He exercises with enough effort to increase his heart rate 3 or less days per week.   He is taking medications regularly.     Review of Systems   Constitutional: Negative for activity change, appetite change, diaphoresis and fatigue.   Cardiovascular: Negative for chest pain, palpitations and peripheral edema.   Gastrointestinal: Negative for diarrhea.   Musculoskeletal: Positive for arthralgias.   Neurological: Negative for dizziness, weakness, numbness, headaches and paresthesias.          Objective    BP (!) 148/85 (BP Location: Right arm, Patient Position: Sitting)   Pulse 61   Temp 98.1  F (36.7  C)   Resp 20   Ht 1.854 m (6' 1\")   Wt (!) 177.4 kg (391 lb)   SpO2 96%   BMI 51.59 kg/m    Body mass index is 51.59 kg/m .  Physical Exam  Vitals reviewed.   Constitutional:       General: He is not in acute distress.  Cardiovascular:      Rate and Rhythm: Normal rate and regular rhythm.      Heart sounds: Normal heart sounds. No murmur heard.  Pulmonary:      Effort: Pulmonary effort is normal. No respiratory distress.      Breath sounds: No wheezing.   Musculoskeletal:      Right lower leg: No edema.      Left lower leg: No edema.   Neurological:      General: No focal deficit present.      Mental Status: He is alert.      Cranial Nerves: No cranial nerve deficit.      Motor: No weakness.      Gait: Gait normal.   Psychiatric:         Mood and Affect: Mood normal.         Thought Content: Thought content normal.        At the end of the visit, I confirmed understanding of what was discussed. Patient " has no further questions or concerns that were brought up at this time.     Myah Mcmahan, JEANNINE, APRN, FNP-C

## 2023-07-10 ENCOUNTER — TELEPHONE (OUTPATIENT)
Dept: FAMILY MEDICINE | Facility: CLINIC | Age: 63
End: 2023-07-10
Payer: COMMERCIAL

## 2023-07-10 DIAGNOSIS — E87.6 HYPOKALEMIA: Primary | ICD-10-CM

## 2023-07-10 RX ORDER — SPIRONOLACTONE 25 MG/1
25 TABLET ORAL DAILY
Qty: 90 TABLET | Refills: 0 | Status: SHIPPED | OUTPATIENT
Start: 2023-07-10 | End: 2023-11-13

## 2023-07-10 ASSESSMENT — ENCOUNTER SYMPTOMS
NUMBNESS: 0
DIARRHEA: 0
ARTHRALGIAS: 1
WEAKNESS: 0
ACTIVITY CHANGE: 0
DIAPHORESIS: 0
PARESTHESIAS: 0
FATIGUE: 0
HEADACHES: 0
DIZZINESS: 0
APPETITE CHANGE: 0
PALPITATIONS: 0

## 2023-07-10 NOTE — TELEPHONE ENCOUNTER
Called patient and left message to call clinic back.  If he calls back please relay the following message.    Stephen,    Your lab work is back and shows that your potassium is still low, not surprisingly.  It does not seem to be related to GI losses, which we kind of already knew, as your urine potassium is normal.  Is not related to low magnesium, this level is normal.  Your aldosterone level is also normal.  I talked to a colleague of mine today about the persistent low potassium and I think we will start you on a medication called spironolactone.  This is a potassium sparing diuretic and may help bring your potassium up as well as better control your blood pressure.  He will take this daily.  I would like to see you in clinic in about a week for a lab only appointment to check your electrolytes and potassium level on the spironolactone.  I did send a prescription to the mail order pharmacy for you so just make a lab appointment 7 days after you have started the spironolactone.  I will reach out when I see the result if we need to do anything more.    I still think it is a good idea for you to see cardiology as well as nephrology because of this blood pressure that is requiring 4 different medications to manage as well as the persistent low potassium.  I have placed referrals for both.    I will keep an eye on your chart and they should CC me on the chart when you see cardiology nephrology, but please reach out with any questions or concerns.    Myah Russell

## 2023-07-11 LAB
ALDOST SERPL-MCNC: 13.7 NG/DL (ref 0–31)
ATRIAL RATE - MUSE: 62 BPM
DIASTOLIC BLOOD PRESSURE - MUSE: NORMAL MMHG
INTERPRETATION ECG - MUSE: NORMAL
P AXIS - MUSE: 48 DEGREES
PR INTERVAL - MUSE: 220 MS
QRS DURATION - MUSE: 102 MS
QT - MUSE: 476 MS
QTC - MUSE: 483 MS
R AXIS - MUSE: -4 DEGREES
SYSTOLIC BLOOD PRESSURE - MUSE: NORMAL MMHG
T AXIS - MUSE: 169 DEGREES
VENTRICULAR RATE- MUSE: 62 BPM

## 2023-07-11 NOTE — TELEPHONE ENCOUNTER
Patient Returning Call    Reason for call:  Returning Call to Clinic    Information relayed to patient:  Relayed message from Mesha MONTEIRO CNP    Patient has additional questions:  No extends thanks for Mesha Mcmahan.

## 2023-07-24 DIAGNOSIS — I10 ESSENTIAL HYPERTENSION: ICD-10-CM

## 2023-07-24 NOTE — TELEPHONE ENCOUNTER
"Routing refill request to provider for review/approval because:  Labs out of range:  K+  Blood pressure readings are out of range    Last Written Prescription Date:  9/28/2022  Last Fill Quantity: 90,  # refills: 2   Last office visit provider:  7/7/2023    Last Written Prescription Date:  9/28/2022  Last Fill Quantity: 90,  # refills: 2   Last office visit: 7/7/2023       Requested Prescriptions   Pending Prescriptions Disp Refills    lisinopril (ZESTRIL) 40 MG tablet 90 tablet 2     Sig: Take 1 tablet (40 mg) by mouth daily       ACE Inhibitors (Including Combos) Protocol Failed - 7/24/2023 10:27 AM        Failed - Blood pressure under 140/90 in past 12 months     BP Readings from Last 3 Encounters:   07/07/23 (!) 148/85 05/17/23 (!) 144/79 05/11/23 128/76                 Failed - Normal serum potassium on file in past 12 months     Recent Labs   Lab Test 07/07/23  1227   POTASSIUM 3.0*             Passed - Recent (12 mo) or future (30 days) visit within the authorizing provider's specialty     Patient has had an office visit with the authorizing provider or a provider within the authorizing providers department within the previous 12 mos or has a future within next 30 days. See \"Patient Info\" tab in inbasket, or \"Choose Columns\" in Meds & Orders section of the refill encounter.              Passed - Medication is active on med list        Passed - Patient is age 18 or older        Passed - Normal serum creatinine on file in past 12 months     Recent Labs   Lab Test 07/07/23  1227   CR 1.15       Ok to refill medication if creatinine is low            atenolol (TENORMIN) 50 MG tablet 90 tablet 2     Sig: Take 1 tablet (50 mg) by mouth daily       Beta-Blockers Protocol Failed - 7/24/2023 10:27 AM        Failed - Blood pressure under 140/90 in past 12 months     BP Readings from Last 3 Encounters:   07/07/23 (!) 148/85 05/17/23 (!) 144/79 05/11/23 128/76                 Passed - Patient is age 6 or older    " "    Passed - Recent (12 mo) or future (30 days) visit within the authorizing provider's specialty     Patient has had an office visit with the authorizing provider or a provider within the authorizing providers department within the previous 12 mos or has a future within next 30 days. See \"Patient Info\" tab in inbasket, or \"Choose Columns\" in Meds & Orders section of the refill encounter.              Passed - Medication is active on med list             ERIK MILLARD RN 07/24/23 1:12 PM  "

## 2023-07-24 NOTE — TELEPHONE ENCOUNTER
[facility] calling to request new rx for the following medications.  Stating patient is changing over to home delivery pharmacy due to cost savings.  Please send new rx to [facility] on file.

## 2023-07-25 RX ORDER — LISINOPRIL 40 MG/1
40 TABLET ORAL DAILY
Qty: 90 TABLET | Refills: 2 | Status: SHIPPED | OUTPATIENT
Start: 2023-07-25 | End: 2024-04-12

## 2023-07-25 RX ORDER — ATENOLOL 50 MG/1
50 TABLET ORAL DAILY
Qty: 90 TABLET | Refills: 2 | Status: SHIPPED | OUTPATIENT
Start: 2023-07-25 | End: 2024-04-12

## 2023-09-08 DIAGNOSIS — I10 ESSENTIAL HYPERTENSION: ICD-10-CM

## 2023-09-08 RX ORDER — ATORVASTATIN CALCIUM 80 MG/1
80 TABLET, FILM COATED ORAL DAILY
Qty: 90 TABLET | Refills: 3 | Status: SHIPPED | OUTPATIENT
Start: 2023-09-08 | End: 2024-08-22

## 2023-10-22 ENCOUNTER — HEALTH MAINTENANCE LETTER (OUTPATIENT)
Age: 63
End: 2023-10-22

## 2023-11-07 ENCOUNTER — OFFICE VISIT (OUTPATIENT)
Dept: FAMILY MEDICINE | Facility: CLINIC | Age: 63
End: 2023-11-07
Payer: COMMERCIAL

## 2023-11-07 VITALS
SYSTOLIC BLOOD PRESSURE: 197 MMHG | TEMPERATURE: 97.7 F | RESPIRATION RATE: 20 BRPM | DIASTOLIC BLOOD PRESSURE: 100 MMHG | OXYGEN SATURATION: 96 % | HEART RATE: 52 BPM

## 2023-11-07 DIAGNOSIS — M54.41 ACUTE BILATERAL LOW BACK PAIN WITH RIGHT-SIDED SCIATICA: Primary | ICD-10-CM

## 2023-11-07 PROBLEM — M54.9 PAIN IN BACK: Status: ACTIVE | Noted: 2023-11-07

## 2023-11-07 PROBLEM — M47.816 SPONDYLOSIS OF LUMBAR SPINE: Status: ACTIVE | Noted: 2023-11-07

## 2023-11-07 PROBLEM — M54.16 LUMBAR RADICULOPATHY: Status: ACTIVE | Noted: 2023-11-07

## 2023-11-07 PROBLEM — G47.33 OBSTRUCTIVE SLEEP APNEA SYNDROME: Status: ACTIVE | Noted: 2018-11-05

## 2023-11-07 PROCEDURE — 99213 OFFICE O/P EST LOW 20 MIN: CPT | Performed by: STUDENT IN AN ORGANIZED HEALTH CARE EDUCATION/TRAINING PROGRAM

## 2023-11-07 RX ORDER — HYDROMORPHONE HYDROCHLORIDE 2 MG/1
2 TABLET ORAL EVERY 6 HOURS PRN
Qty: 10 TABLET | Refills: 0 | Status: SHIPPED | OUTPATIENT
Start: 2023-11-07 | End: 2023-11-10

## 2023-11-07 NOTE — PATIENT INSTRUCTIONS
We advise patients to reduce exacerbating physical activity, typically during the first week after symptom onset. Patients often identify pain-relieving positions for themselves. Some individuals report that crouching, reclining, or lying in certain positions in bed provide meaningful pain relief. Others find that recumbency in bed is associated with increased pain. Such patients may prefer to be up and around, even going about light work duties.

## 2023-11-07 NOTE — PROGRESS NOTES
Assessment & Plan     Acute bilateral low back pain with right-sided sciatica  No red flag symptoms concerning for cauda equina syndrome.  History not consistent with any trauma indicating x-ray.  Most likely right-sided sciatica.  We discussed the typical course of improvement and provided exercises, pain management discussion.  While a short course of steroids would be reasonable given Stephen's symptoms, I do not feel comfortable with his current blood pressure 201/102, repeat 197/100 and he was understanding of that. PDMP reviewed and appropriate; will prescribe a 3d course of oral Dilaudid.  - exercises 3x daily, heat  - continue TENs unit  - alternate Tylenol, Naproxen every 6 hours as needed  - return if symptoms do not improve in 7d  - HYDROmorphone (DILAUDID) 2 MG tablet  Dispense: 10 tablet; Refill: 0    No follow-ups on file.    Stefani Neil, DO  she/her  [facility]    Subjective     Stephen Vyas is a 63 year old male who presents to clinic today for the following health issues:    HPI    Sciatic nerve pinch x 3 days, pain radiating down Rt leg, worse today, numbness and tingling with pain on Rt leg     MS Injury/Pain    Onset of symptoms was 3 day(s) ago.  Location: right lower back/lateral hip leg  No injury  Tingling, numbness, feels like a muscle cramp sometimes  This morning, trouble standing, getting dressed because of spasm  Took BP medications this morning but just before getting here  No recent falls, back trauma, MVA  Standing, laying flat makes it worse  Took Naproxen and Tylenol this morning  No loss of sensation in perineal area, no loss of bowel or bladder control    Past Medical History:   Diagnosis Date    Essential hypertension     Testicle cancer (H)      Allergies   Allergen Reactions    Codeine      Other reaction(s): GI intolerance  Verified     Current Outpatient Medications   Medication    amLODIPine (NORVASC) 10 MG tablet    atenolol (TENORMIN) 50 MG tablet     atorvastatin (LIPITOR) 80 MG tablet    HYDROmorphone (DILAUDID) 2 MG tablet    lisinopril (ZESTRIL) 40 MG tablet    spironolactone (ALDACTONE) 25 MG tablet     No current facility-administered medications for this visit.      Review of Systems  Constitutional, HEENT, cardiovascular, pulmonary, gi and gu systems are negative, except as otherwise noted.      Objective    BP (!) 197/100 (BP Location: Right arm, Patient Position: Sitting, Cuff Size: Adult Large)   Pulse 52   Temp 97.7  F (36.5  C) (Oral)   Resp 20   SpO2 96%     Physical Exam   GENERAL: healthy, alert and no distress  MSK: TENS unit in place.  3 firm subcutaneous growths that patient reports are baseline after prior spinal surgery. Tenderness to palpation over the right piriformis muscle and lateral gluteus mahesh. Unable to perform straight leg raises as patient was unable to get onto table d/t body habitus and acute symptoms    The use of Dragon/[employer] dictation services may have been used to construct the content in this note; any grammatical or spelling errors are non-intentional. Please contact the author of this note directly if you are in need of any clarification.

## 2023-11-13 ENCOUNTER — TELEPHONE (OUTPATIENT)
Dept: FAMILY MEDICINE | Facility: CLINIC | Age: 63
End: 2023-11-13
Payer: COMMERCIAL

## 2023-11-13 DIAGNOSIS — I1A.0 RESISTANT HYPERTENSION: ICD-10-CM

## 2023-11-13 RX ORDER — SPIRONOLACTONE 25 MG/1
25 TABLET ORAL DAILY
Qty: 90 TABLET | Refills: 0 | Status: SHIPPED | OUTPATIENT
Start: 2023-11-13 | End: 2024-03-15

## 2023-11-13 NOTE — TELEPHONE ENCOUNTER
Refilled for 90 days. Patient needs appointment with me ASAP for blood pressure and potassium recheck. Looks like it was 190 systolic at his last visit. Please call and confirm he has the BP meds he is supposed to be on (let me know if he needs refills) and get him in on an override slot with me.

## 2023-11-13 NOTE — TELEPHONE ENCOUNTER
[facility] called and stated Pt is requesting:    spironolactone (ALDACTONE) 25 MG tablet, A 90 day supply with up to 3 refills.     May fax to [phone]

## 2023-11-14 NOTE — TELEPHONE ENCOUNTER
Contacted patient and reviewed message below.  Patient confirmed he is taking amlodipine 10 mg, lisinopril 40 mg, atenolol 50 mg, and aldactone 25 mg.  He does have all medications available.      Offered to schedule apt for labs and follow up with PCP, patient states his sciatica is really bothering him and he was about to have his wife take him to urgent care.  Further states his last BP was elevated when he was in for sciatica 1 week ago.  Strongly encouraged patient to call back to schedule apt asap with PCP once he addresses his sciatica.  Patient verbalized understanding.

## 2024-03-15 DIAGNOSIS — I1A.0 RESISTANT HYPERTENSION: ICD-10-CM

## 2024-03-15 RX ORDER — SPIRONOLACTONE 25 MG/1
25 TABLET ORAL DAILY
Qty: 90 TABLET | Refills: 0 | Status: SHIPPED | OUTPATIENT
Start: 2024-03-15 | End: 2024-06-11

## 2024-03-15 NOTE — TELEPHONE ENCOUNTER
Medication Question or Refill        What medication are you calling about (include dose and sig)?: Spironolactone 25MG (90 days supply with 3 refills). Pt is going to run out soon.    Preferred Pharmacy:       [facility] - [address] [address]  Phone: [phone] Fax: [phone]      Controlled Substance Agreement on file:   CSA -- Patient Level:    CSA: None found at the patient level.       Who prescribed the medication?: Mesha Rapp    Do you need a refill? Yes    When did you use the medication last? unknown    Patient offered an appointment? No    Do you have any questions or concerns?  No      Could we send this information to you in [place] or would you prefer to receive a phone call?:   Patient would prefer a phone call   Okay to leave a detailed message?: Yes at Other phone number:  [phone]

## 2024-03-15 NOTE — TELEPHONE ENCOUNTER
Routing refill request to provider for review/approval because:  BP parameters    Last Written Prescription Date:  11/13/23  Last Fill Quantity: 90,  # refills: 0   Last office visit provider:  7/7/23     Requested Prescriptions   Pending Prescriptions Disp Refills    spironolactone (ALDACTONE) 25 MG tablet 90 tablet 1     Sig: Take 1 tablet (25 mg) by mouth daily       Diuretics (Including Combos) Protocol Failed - 3/15/2024 12:13 PM        Failed - Blood pressure under 140/90 in past 12 months     BP Readings from Last 3 Encounters:   11/07/23 (!) 197/100   07/07/23 (!) 148/85   05/17/23 (!) 144/79                 Passed - Medication is active on med list        Passed - Has GFR on file in past 12 months and most recent value is normal        Passed - Medication indicated for associated diagnosis     Medication is associated with one or more of the following diagnoses:     Hypertension   Heart Failure   Hyperaldosteronism   Acne   Hirsutism   Hypokalemia   Hepatic Cirrhosis   Nephrotic Syndrome   Myocardial Infarction   Transgender Female          Passed - Recent (12 mo) or future (90 days) visit within the authorizing provider's specialty     The patient must have completed an in-person or virtual visit within the past 12 months or has a future visit scheduled within the next 90 days with the authorizing provider s specialty.  Urgent care and e-visits do not quality as an office visit for this protocol.          Passed - Patient is age 18 or older             Lidia Laird RN 03/15/24 12:14 PM

## 2024-04-12 DIAGNOSIS — I10 ESSENTIAL HYPERTENSION: ICD-10-CM

## 2024-04-12 RX ORDER — LISINOPRIL 40 MG/1
40 TABLET ORAL DAILY
Qty: 90 TABLET | Refills: 0 | Status: SHIPPED | OUTPATIENT
Start: 2024-04-12 | End: 2024-06-14

## 2024-04-12 RX ORDER — ATENOLOL 50 MG/1
50 TABLET ORAL DAILY
Qty: 90 TABLET | Refills: 0 | Status: SHIPPED | OUTPATIENT
Start: 2024-04-12 | End: 2024-06-14

## 2024-06-11 DIAGNOSIS — I1A.0 RESISTANT HYPERTENSION: ICD-10-CM

## 2024-06-11 DIAGNOSIS — I10 ESSENTIAL HYPERTENSION: ICD-10-CM

## 2024-06-11 RX ORDER — SPIRONOLACTONE 25 MG/1
25 TABLET ORAL DAILY
Qty: 15 TABLET | Refills: 0 | Status: SHIPPED | OUTPATIENT
Start: 2024-06-11 | End: 2024-07-01

## 2024-06-11 RX ORDER — ATENOLOL 50 MG/1
50 TABLET ORAL DAILY
Qty: 90 TABLET | Refills: 3 | OUTPATIENT
Start: 2024-06-11

## 2024-06-11 RX ORDER — LISINOPRIL 40 MG/1
40 TABLET ORAL DAILY
Qty: 90 TABLET | Refills: 3 | OUTPATIENT
Start: 2024-06-11

## 2024-06-11 NOTE — TELEPHONE ENCOUNTER
[facility]X [facility] Refill Request      spironolactone (ALDACTONE) 25 MG tablet   lisinopril (ZESTRIL) 40 MG tablet   atenolol (TENORMIN) 50 MG tablet       Last Visit with PCP = 07/07/2023  No Show 04/26/24 with PCP    Next Visit with a provider = 06/14/24 with Annita MONTEIRO [facility]

## 2024-06-14 ENCOUNTER — ORDERS ONLY (AUTO-RELEASED) (OUTPATIENT)
Dept: FAMILY MEDICINE | Facility: CLINIC | Age: 64
End: 2024-06-14

## 2024-06-14 ENCOUNTER — OFFICE VISIT (OUTPATIENT)
Dept: FAMILY MEDICINE | Facility: CLINIC | Age: 64
End: 2024-06-14
Payer: COMMERCIAL

## 2024-06-14 VITALS
BODY MASS INDEX: 44.1 KG/M2 | RESPIRATION RATE: 22 BRPM | OXYGEN SATURATION: 97 % | HEART RATE: 65 BPM | WEIGHT: 315 LBS | TEMPERATURE: 97.6 F | SYSTOLIC BLOOD PRESSURE: 176 MMHG | HEIGHT: 71 IN | DIASTOLIC BLOOD PRESSURE: 104 MMHG

## 2024-06-14 DIAGNOSIS — Z00.00 ROUTINE HISTORY AND PHYSICAL EXAMINATION OF ADULT: Primary | ICD-10-CM

## 2024-06-14 DIAGNOSIS — E66.01 CLASS 3 SEVERE OBESITY DUE TO EXCESS CALORIES WITH BODY MASS INDEX (BMI) OF 50.0 TO 59.9 IN ADULT, UNSPECIFIED WHETHER SERIOUS COMORBIDITY PRESENT (H): ICD-10-CM

## 2024-06-14 DIAGNOSIS — I10 PRIMARY HYPERTENSION: ICD-10-CM

## 2024-06-14 DIAGNOSIS — Z12.11 SCREEN FOR COLON CANCER: ICD-10-CM

## 2024-06-14 DIAGNOSIS — H61.23 BILATERAL IMPACTED CERUMEN: ICD-10-CM

## 2024-06-14 DIAGNOSIS — Z13.1 SCREENING FOR DIABETES MELLITUS: ICD-10-CM

## 2024-06-14 DIAGNOSIS — E66.813 CLASS 3 SEVERE OBESITY DUE TO EXCESS CALORIES WITH BODY MASS INDEX (BMI) OF 50.0 TO 59.9 IN ADULT, UNSPECIFIED WHETHER SERIOUS COMORBIDITY PRESENT (H): ICD-10-CM

## 2024-06-14 DIAGNOSIS — E87.6 HYPOKALEMIA: ICD-10-CM

## 2024-06-14 DIAGNOSIS — G47.33 OBSTRUCTIVE SLEEP APNEA SYNDROME: ICD-10-CM

## 2024-06-14 DIAGNOSIS — M54.41 CHRONIC RIGHT-SIDED LOW BACK PAIN WITH RIGHT-SIDED SCIATICA: ICD-10-CM

## 2024-06-14 DIAGNOSIS — E78.2 MIXED HYPERLIPIDEMIA: ICD-10-CM

## 2024-06-14 DIAGNOSIS — G89.29 CHRONIC RIGHT-SIDED LOW BACK PAIN WITH RIGHT-SIDED SCIATICA: ICD-10-CM

## 2024-06-14 LAB
ERYTHROCYTE [DISTWIDTH] IN BLOOD BY AUTOMATED COUNT: 13.7 % (ref 10–15)
HBA1C MFR BLD: 5.6 % (ref 0–5.6)
HCT VFR BLD AUTO: 43.1 % (ref 40–53)
HGB BLD-MCNC: 14.4 G/DL (ref 13.3–17.7)
MCH RBC QN AUTO: 28.8 PG (ref 26.5–33)
MCHC RBC AUTO-ENTMCNC: 33.4 G/DL (ref 31.5–36.5)
MCV RBC AUTO: 86 FL (ref 78–100)
PLATELET # BLD AUTO: 314 10E3/UL (ref 150–450)
RBC # BLD AUTO: 5 10E6/UL (ref 4.4–5.9)
WBC # BLD AUTO: 10.3 10E3/UL (ref 4–11)

## 2024-06-14 PROCEDURE — 83036 HEMOGLOBIN GLYCOSYLATED A1C: CPT

## 2024-06-14 PROCEDURE — 36415 COLL VENOUS BLD VENIPUNCTURE: CPT

## 2024-06-14 PROCEDURE — 99214 OFFICE O/P EST MOD 30 MIN: CPT | Mod: 25

## 2024-06-14 PROCEDURE — 99396 PREV VISIT EST AGE 40-64: CPT | Mod: 25

## 2024-06-14 PROCEDURE — 85027 COMPLETE CBC AUTOMATED: CPT

## 2024-06-14 PROCEDURE — 80061 LIPID PANEL: CPT

## 2024-06-14 PROCEDURE — 80053 COMPREHEN METABOLIC PANEL: CPT

## 2024-06-14 PROCEDURE — 69209 REMOVE IMPACTED EAR WAX UNI: CPT | Mod: 50

## 2024-06-14 RX ORDER — LISINOPRIL AND HYDROCHLOROTHIAZIDE 20; 25 MG/1; MG/1
1 TABLET ORAL DAILY
Qty: 60 TABLET | Refills: 0 | Status: SHIPPED | OUTPATIENT
Start: 2024-06-14 | End: 2024-07-01

## 2024-06-14 RX ORDER — AMLODIPINE BESYLATE 5 MG/1
5 TABLET ORAL DAILY
Qty: 90 TABLET | Refills: 0 | Status: SHIPPED | OUTPATIENT
Start: 2024-06-14 | End: 2024-07-01

## 2024-06-14 RX ORDER — LISINOPRIL AND HYDROCHLOROTHIAZIDE 20; 25 MG/1; MG/1
1 TABLET ORAL DAILY
Qty: 60 TABLET | Refills: 0 | Status: SHIPPED | OUTPATIENT
Start: 2024-06-14 | End: 2024-06-14

## 2024-06-14 RX ORDER — ASPIRIN 81 MG/1
81 TABLET ORAL DAILY
COMMUNITY

## 2024-06-14 SDOH — HEALTH STABILITY: PHYSICAL HEALTH: ON AVERAGE, HOW MANY DAYS PER WEEK DO YOU ENGAGE IN MODERATE TO STRENUOUS EXERCISE (LIKE A BRISK WALK)?: 0 DAYS

## 2024-06-14 SDOH — HEALTH STABILITY: PHYSICAL HEALTH: ON AVERAGE, HOW MANY MINUTES DO YOU ENGAGE IN EXERCISE AT THIS LEVEL?: 0 MIN

## 2024-06-14 ASSESSMENT — PAIN SCALES - GENERAL: PAINLEVEL: MILD PAIN (2)

## 2024-06-14 ASSESSMENT — SOCIAL DETERMINANTS OF HEALTH (SDOH): HOW OFTEN DO YOU GET TOGETHER WITH FRIENDS OR RELATIVES?: THREE TIMES A WEEK

## 2024-06-14 NOTE — PROGRESS NOTES
Preventive Care Visit  [facility]  THANIA Rasheed CNP, Family Medicine  Jun 14, 2024      Assessment & Plan     Routine history and physical examination of adult  Screening labs today per maintenance, age, risk assessment, and to promote patient wellbeing.  Declined COVID and RSV vaccine.  Encouraged to think about Zoster vaccine.    - REVIEW OF HEALTH MAINTENANCE PROTOCOL ORDERS  - PRIMARY CARE FOLLOW-UP SCHEDULING; Future  - CBC with platelets    Screen for colon cancer  Completed cologaurd in 2019, negative for malignancy.  Declined referral for colonoscopy.   - COLOGUALETY(EXACT SCIENCES); Future    Primary hypertension  Uncontrolled blood pressures in clinic 174/104 x2.  Previous blood pressures 201/102 & 197/100.  Hypertension previously managed with 50 mg atenolol, 40 mg lisinopril, and 25 mg spironolactone daily.  Was taking as prescribed.  Since blood pressure is not close to goal, will change mediations to AHA guidelines.  Will start 20-25 mg lisinopril-hydrochlorothiazide to reduce pill burden and 5 mg amlodipine daily.  Advised patient to monitor home blood pressures.  Will have patient return back in 2 weeks for follow up on blood pressure and medication management.  Emphasized importance of taking medication in the morning daily.  Limit sodium intake.  Monitor for symptoms of headaches, dizziness, lightheadedness, vision changes, dyspnea, chest pain/discomfort/pressure, palpitations, numbness/tingling, peripheral edema and seek medication attention immediately.   - amLODIPine (NORVASC) 5 MG tablet; Take 1 tablet (5 mg) by mouth daily  - lisinopril-hydrochlorothiazide (ZESTORETIC) 20-25 MG tablet; Take 1 tablet by mouth daily  - Comprehensive metabolic panel    Screening for diabetes mellitus  2022 A1c 5.6.  Managed with lifestyle modifications.   - Hemoglobin A1c    Obstructive sleep apnea syndrome  Uses CPAP at night for KAY management.    Chronic right-sided low back pain with  "right-sided sciatica  Chronic right sided low back pain with right sided sciatica.  Managing with conservative measures.  Pain is currently tolerable.  Had previously done physical therapy and steroid injections with some improvement.  Knows that weight is contributing to lower back pain.    - Orthopedic  Referral; Future    Class 3 severe obesity due to excess calories with body mass index (BMI) of 50.0 to 59.9 in adult, unspecified whether serious comorbidity present (H)  Referral sent to weight management for weight loss options.  Excess weight has been contributing to increased joint pain and   - Adult Comprehensive Weight Management  Referral; Future    Hypokalemia  History of hypokalemia.  2/2024 potassium level 3.3.  Encouraged potassium rich foods.   - Comprehensive metabolic panel    Mixed hyperlipidemia  Managed with 80 mg atorvastatin.  2022 .  Will get update lipid panel.  If ASCVD risk score >20%, will discuss with patient about adding 10 mg ezetimibe daily for reduction of cholesterol.  Will also discuss getting CAC to assess for CAD.     - Lipid panel reflex to direct LDL Fasting    Bilateral impacted cerumen  Successful irrigation of impacted cerumen in both ears.  - DE REMOVAL IMPACTED CERUMEN IRRIGATION/LVG UNILAT    BMI  Estimated body mass index is 54.46 kg/m  as calculated from the following:    Height as of this encounter: 1.808 m (5' 11.18\").    Weight as of this encounter: 178 kg (392 lb 8 oz).   Weight management plan: Patient referred to endocrine and/or weight management specialty    Counseling  Appropriate preventive services were discussed with this patient, including applicable screening as appropriate for fall prevention, nutrition, physical activity, Tobacco-use cessation, weight loss and cognition.  Checklist reviewing preventive services available has been given to the patient.  Reviewed patient's diet, addressing concerns and/or questions.   The patient was " instructed to see the dentist every 6 months.   He is at risk for psychosocial distress and has been provided with information to reduce risk.     Janelle Mitchell is a 63 year old, presenting for the following:  Physical        6/14/2024    11:17 AM   Additional Questions   Roomed by Beth GLOVER CMA         6/14/2024   Forms   Any forms needing to be completed Yes     Health Care Directive  Patient does not have a Health Care Directive or Living Will    HPI    Patient is a 63 year old male presenting for annual physical exam.  Medical history includes HTN, lumbar radiculopathy, KAY, morbid obesity, hypokalemia, testicular cancer S/P right orchiectomy.  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      KAY managed with CPAP at night.  Hyperlipidemia managed with 80 mg atorvastatin daily.  Hypertension managed with 50 mg atenolol, 40 mg lisinopril, and 25 mg spironolactone daily.  Takes mediations as prescribed daily.  Denies any side effects of medication.  Chronic lower back pain with right sided sciatica.  Recently had increased pain 4 weeks ago that lasted for 2 weeks.  Pain originated in right sided lower back and radiates down to right foot.  Denies bladder or sydney incontinence.  Pain has mostly subsided with rest and conservative measures.  Was seen in the ED 11/2023 for acute flare of pain and was prescribed medrol dosepak and tizanidine which helped decrease pain.  Had previously done physical therapy and steroid injections with orthopedics with some improvement.  Patient is aware his weight is contributing factor to joint and low back pain.  He would like assistance with weight loss management.  Daily ADL are difficult to perform with acute pain.  Currently lower back pain is tolerable with conservative measures.          6/14/2024   General Health   How would you rate your overall physical health? (!) FAIR   Feel stress (tense, anxious, or unable to  sleep) Only a little   (!) STRESS CONCERN      6/14/2024   Nutrition   Three or more servings of calcium each day? (!) NO   Diet: Regular (no restrictions)   How many servings of fruit and vegetables per day? (!) 0-1   How many sweetened beverages each day? 0-1         6/14/2024   Exercise   Days per week of moderate/strenous exercise 0 days   Average minutes spent exercising at this level 0 min   (!) EXERCISE CONCERN      6/14/2024   Social Factors   Frequency of gathering with friends or relatives Three times a week   Worry food won't last until get money to buy more No   Food not last or not have enough money for food? No   Do you have housing?  Yes   Are you worried about losing your housing? No   Lack of transportation? No   Unable to get utilities (heat,electricity)? No         6/14/2024   Fall Risk   Fallen 2 or more times in the past year? No   Trouble with walking or balance? Yes   Gait Speed Test (Document in seconds) 3.62          6/14/2024   Dental   Dentist two times every year? (!) NO         6/14/2024   TB Screening   Were you born outside of the [place]? No     Today's PHQ-2 Score:       6/14/2024    11:01 AM   PHQ-2 ( 1999 [employer])   Q1: Little interest or pleasure in doing things 0   Q2: Feeling down, depressed or hopeless 0   PHQ-2 Score 0   Q1: Little interest or pleasure in doing things Not at all   Q2: Feeling down, depressed or hopeless Not at all   PHQ-2 Score 0         6/14/2024   Substance Use   Alcohol more than 3/day or more than 7/wk Not Applicable   Do you use any other substances recreationally? No     Social History     Tobacco Use    Smoking status: Never    Smokeless tobacco: Current     Types: Chew   Vaping Use    Vaping status: Never Used   Substance Use Topics    Alcohol use: Not Currently    Drug use: Not Currently           6/14/2024   STI Screening   New sexual partner(s) since last STI/HIV test? No   Last PSA:   Prostate Specific Antigen Screen   Date Value Ref Range Status    2022 0.50 0.00 - 3.50 ng/mL Final     ASCVD Risk   The 10-year ASCVD risk score (Lowell ROCKWELL, et al., 2019) is: 25.4%    Values used to calculate the score:      Age: 63 years      Sex: Male      Is Non- : No      Diabetic: No      Tobacco smoker: No      Systolic Blood Pressure: 172 mmHg      Is BP treated: Yes      HDL Cholesterol: 25 mg/dL      Total Cholesterol: 180 mg/dL    Fracture Risk Assessment Tool  Link to Frax Calculator  Use the information below to complete the Frax calculator  : 1960  Sex: male  Weight (kg): 178 kg (actual weight)  Height (cm): 180.8 cm  Previous Fragility Fracture:  No  History of parent with fractured hip:  No  Current Smoking:  No  Patient has been on glucocorticoids for more than 3 months (5mg/day or more): No  Rheumatoid Arthritis on Problem List:  No  Secondary Osteoporosis on Problem List:  No  Consumes 3 or more units of alcohol per day: No  Femoral Neck BMD (g/cm2)           Reviewed and updated as needed this visit by Provider    Past Medical History:   Diagnosis Date    Essential hypertension     Testicle cancer (H)      Past Surgical History:   Procedure Laterality Date    RADICAL ORCHIECTOMY Right     TONSILLECTOMY, ADENOIDECTOMY, COMBINED Bilateral     When he was about 32     BP Readings from Last 3 Encounters:   24 (!) 176/104   23 (!) 197/100   23 (!) 148/85    Wt Readings from Last 3 Encounters:   24 (!) 178 kg (392 lb 8 oz)   23 (!) 177.4 kg (391 lb)   23 (!) 178.3 kg (393 lb)         Current Outpatient Medications   Medication Sig Dispense Refill    amLODIPine (NORVASC) 5 MG tablet Take 1 tablet (5 mg) by mouth daily 90 tablet 0    aspirin 81 MG EC tablet Take 81 mg by mouth daily      atorvastatin (LIPITOR) 80 MG tablet Take 1 tablet (80 mg) by mouth daily 90 tablet 3    lisinopril-hydrochlorothiazide (ZESTORETIC) 20-25 MG tablet Take 1 tablet by mouth daily 60 tablet 0     "spironolactone (ALDACTONE) 25 MG tablet Take 1 tablet (25 mg) by mouth daily 15 tablet 0     Review of Systems  Review of systems negative otherwise known HPI.     Objective    Exam  BP (!) 172/104 (BP Location: Right arm, Patient Position: Sitting, Cuff Size: Adult Large)   Pulse 65   Temp 97.6  F (36.4  C) (Oral)   Resp 22   Ht 1.808 m (5' 11.18\")   Wt (!) 178 kg (392 lb 8 oz)   SpO2 97%   BMI 54.46 kg/m     Estimated body mass index is 54.46 kg/m  as calculated from the following:    Height as of this encounter: 1.808 m (5' 11.18\").    Weight as of this encounter: 178 kg (392 lb 8 oz).    Physical Exam  General: Alert, oriented, no acute distress.    Eyes: Normal external eye, conjunctiva, and lids. HORACE.  Ears: External ear nontender. Normal landmark and color.    Oropharynx: Dentition intact. Oral and posterior pharynx pink and moist.     Neck: Supple, no masses or nodes. No adenopathy.    Respiratory: Lungs clear, unlabored. No rales, rhonchi, or wheezes.    Cardiovascular: Regular rate and rhythm, S1 and S2. No murmurs. No peripheral edema.     Gastrointestinal: Normoactive bowel sounds. Hard to feel organs with excess abdominal weight.   Musculoskeletal: Right sided lower back and bilateral knee pain.   Skin: No suspicious lesions, rashes, or abnormalities.    Neurologic: No gross motor or sensory deficit. Mentation intact and speech normal.     Psychiatric: Appropriate affect.   Genitourinary: Declined, no concerns.   Rectal Exam: Declined, no concerns.     Signed Electronically by: THANIA Rasheed CNP    "

## 2024-06-15 LAB
ALBUMIN SERPL BCG-MCNC: 4 G/DL (ref 3.5–5.2)
ALP SERPL-CCNC: 91 U/L (ref 40–150)
ALT SERPL W P-5'-P-CCNC: 29 U/L (ref 0–70)
ANION GAP SERPL CALCULATED.3IONS-SCNC: 12 MMOL/L (ref 7–15)
AST SERPL W P-5'-P-CCNC: 25 U/L (ref 0–45)
BILIRUB SERPL-MCNC: 0.7 MG/DL
BUN SERPL-MCNC: 18.3 MG/DL (ref 8–23)
CALCIUM SERPL-MCNC: 9 MG/DL (ref 8.8–10.2)
CHLORIDE SERPL-SCNC: 99 MMOL/L (ref 98–107)
CHOLEST SERPL-MCNC: 160 MG/DL
CREAT SERPL-MCNC: 1.18 MG/DL (ref 0.67–1.17)
DEPRECATED HCO3 PLAS-SCNC: 28 MMOL/L (ref 22–29)
EGFRCR SERPLBLD CKD-EPI 2021: 70 ML/MIN/1.73M2
FASTING STATUS PATIENT QL REPORTED: YES
FASTING STATUS PATIENT QL REPORTED: YES
GLUCOSE SERPL-MCNC: 103 MG/DL (ref 70–99)
HDLC SERPL-MCNC: 25 MG/DL
LDLC SERPL CALC-MCNC: 107 MG/DL
NONHDLC SERPL-MCNC: 135 MG/DL
POTASSIUM SERPL-SCNC: 4 MMOL/L (ref 3.4–5.3)
PROT SERPL-MCNC: 7.3 G/DL (ref 6.4–8.3)
SODIUM SERPL-SCNC: 139 MMOL/L (ref 135–145)
TRIGL SERPL-MCNC: 142 MG/DL

## 2024-06-15 NOTE — RESULT ENCOUNTER NOTE
Ages 40 to 75 years old, ASCVD (atherosclerotic cardiovascular disease) goal is <5%.  Currently you are at 24% (classified as high risk) which puts you at high risk for a cardiovascular event such as heart attack or stroke.  You are already on high dose atorvastatin.  Would need to add you on another cholesterol lowering medication such as 10 mg ezetimibe daily.  I would also advise to get a CT coronary calcium scan to evaluated for coronary artery disease.  I can order the medication and CT now or we can discuss at upcoming visit.

## 2024-06-17 ENCOUNTER — TELEPHONE (OUTPATIENT)
Dept: FAMILY MEDICINE | Facility: CLINIC | Age: 64
End: 2024-06-17
Payer: COMMERCIAL

## 2024-06-17 DIAGNOSIS — E78.2 MIXED HYPERLIPIDEMIA: Primary | ICD-10-CM

## 2024-06-17 NOTE — TELEPHONE ENCOUNTER
----- Message from THANIA Rasheed CNP sent at 6/15/2024 12:52 PM CDT -----  Ages 40 to 75 years old, ASCVD (atherosclerotic cardiovascular disease) goal is <5%.  Currently you are at 24% (classified as high risk) which puts you at high risk for a cardiovascular event such as heart attack or stroke.  You are already on high dose atorvastatin.  Would need to add you on another cholesterol lowering medication such as 10 mg ezetimibe daily.  I would also advise to get a CT coronary calcium scan to evaluated for coronary artery disease.  I can order the medication and CT now or we can discuss at upcoming visit.

## 2024-06-18 NOTE — TELEPHONE ENCOUNTER
Patient informed of results. Patient is ok with provider sending in medication. Patient would like medication to go to [facility]. Patient would like to discuss medication and CT further at upcoming appointment on 7/1/24.    Beth GLOVER CMA on June 18, 2024 at 2:46 PM

## 2024-06-19 RX ORDER — EZETIMIBE 10 MG/1
10 TABLET ORAL DAILY
Qty: 90 TABLET | Refills: 0 | Status: SHIPPED | OUTPATIENT
Start: 2024-06-19 | End: 2024-07-01

## 2024-07-01 ENCOUNTER — OFFICE VISIT (OUTPATIENT)
Dept: FAMILY MEDICINE | Facility: CLINIC | Age: 64
End: 2024-07-01
Payer: COMMERCIAL

## 2024-07-01 VITALS
HEIGHT: 71 IN | OXYGEN SATURATION: 94 % | BODY MASS INDEX: 44.1 KG/M2 | DIASTOLIC BLOOD PRESSURE: 90 MMHG | SYSTOLIC BLOOD PRESSURE: 160 MMHG | RESPIRATION RATE: 20 BRPM | HEART RATE: 93 BPM | WEIGHT: 315 LBS | TEMPERATURE: 97.7 F

## 2024-07-01 DIAGNOSIS — L72.3 SEBACEOUS CYST: Primary | ICD-10-CM

## 2024-07-01 DIAGNOSIS — I1A.0 RESISTANT HYPERTENSION: ICD-10-CM

## 2024-07-01 DIAGNOSIS — I10 PRIMARY HYPERTENSION: ICD-10-CM

## 2024-07-01 DIAGNOSIS — E78.2 MIXED HYPERLIPIDEMIA: ICD-10-CM

## 2024-07-01 PROCEDURE — 87070 CULTURE OTHR SPECIMN AEROBIC: CPT

## 2024-07-01 PROCEDURE — 87205 SMEAR GRAM STAIN: CPT

## 2024-07-01 PROCEDURE — 99214 OFFICE O/P EST MOD 30 MIN: CPT

## 2024-07-01 RX ORDER — LISINOPRIL 10 MG/1
1 TABLET ORAL DAILY
COMMUNITY
End: 2024-07-01

## 2024-07-01 RX ORDER — EZETIMIBE 10 MG/1
10 TABLET ORAL DAILY
Qty: 90 TABLET | Refills: 3 | Status: SHIPPED | OUTPATIENT
Start: 2024-07-01

## 2024-07-01 RX ORDER — SPIRONOLACTONE 25 MG/1
25 TABLET ORAL DAILY
Qty: 90 TABLET | Refills: 2 | Status: ON HOLD | OUTPATIENT
Start: 2024-07-01 | End: 2024-07-09

## 2024-07-01 RX ORDER — AMLODIPINE BESYLATE 5 MG/1
5 TABLET ORAL DAILY
Qty: 90 TABLET | Refills: 2 | Status: ON HOLD | OUTPATIENT
Start: 2024-07-01 | End: 2024-07-09

## 2024-07-01 RX ORDER — LISINOPRIL AND HYDROCHLOROTHIAZIDE 20; 25 MG/1; MG/1
1 TABLET ORAL DAILY
Qty: 90 TABLET | Refills: 2 | Status: ON HOLD | OUTPATIENT
Start: 2024-07-01 | End: 2024-07-09

## 2024-07-01 RX ORDER — SULFAMETHOXAZOLE/TRIMETHOPRIM 800-160 MG
1 TABLET ORAL 2 TIMES DAILY
Qty: 14 TABLET | Refills: 0 | Status: SHIPPED | OUTPATIENT
Start: 2024-07-01 | End: 2024-07-03

## 2024-07-01 RX ORDER — METOPROLOL SUCCINATE 50 MG/1
1 TABLET, EXTENDED RELEASE ORAL DAILY
COMMUNITY
Start: 2024-02-09 | End: 2024-07-01

## 2024-07-01 ASSESSMENT — PAIN SCALES - GENERAL: PAINLEVEL: NO PAIN (0)

## 2024-07-01 NOTE — PROGRESS NOTES
Answers submitted by the patient for this visit:  Hypertension Visit (Submitted on 7/1/2024)  Chief Complaint: Chronic problems general questions HPI Form  Do you check your blood pressure regularly outside of the clinic?: No  Are your blood pressures ever more than 140 on the top number (systolic) OR more than 90 on the bottom number (diastolic)? (For example, greater than 140/90): Yes  Are you following a low salt diet?: No  General Questionnaire (Submitted on 7/1/2024)  Chief Complaint: Chronic problems general questions HPI Form  How many servings of fruits and vegetables do you eat daily?: 0-1  On average, how many sweetened beverages do you drink each day (Examples: soda, juice, sweet tea, etc.  Do NOT count diet or artificially sweetened beverages)?: 1  How many minutes a day do you exercise enough to make your heart beat faster?: 9 or less  How many days a week do you exercise enough to make your heart beat faster?: 3 or less  How many days per week do you miss taking your medication?: 0    Assessment & Plan     Primary hypertension  Blood pressure remains elevated at 160/90.  Patient ran out of spironolactone 1 week ago which may be attributing to high blood pressure.  Refilled medication.  Once patient is taking all 3 medications consistently for 2 weeks, return back for nurse only blood pressure check.  If blood pressure remains to be elevated despite medication compliance, return back to clinic for evaluation.  - spironolactone (ALDACTONE) 25 MG tablet; Take 1 tablet (25 mg) by mouth daily  - lisinopril-hydrochlorothiazide (ZESTORETIC) 20-25 MG tablet; Take 1 tablet by mouth daily  - amLODIPine (NORVASC) 5 MG tablet; Take 1 tablet (5 mg) by mouth daily    Mixed hyperlipidemia  Hyperlipidemia managed with 80 mg atorvastatin daily.  6/14/2024 .  Added 10 mg Zetia daily for additional cholesterol lowering.  Will continue to modify risk factors to decrease ASCVD risk score which is 20.7%.  - ezetimibe  (ZETIA) 10 MG tablet; Take 1 tablet (10 mg) by mouth daily    Sebaceous cyst  Purulent drainage from sebaceous cyst on left lower back with surrounding erythema.  Obtained a wound culture.  Has 1 more day of Augmentin, completing 10-day course.  Will extend antibiotic treatment with Bactrim for MRSA coverage.  Discussed return precautions.  - sulfamethoxazole-trimethoprim (BACTRIM DS) 800-160 MG tablet; Take 1 tablet by mouth 2 times daily  - Cyst Aerobic Bacterial Culture Routine With Gram Stain    Subjective   Stephen is a 63 year old, presenting for the following health issues:  Hypertension (Bp still running high.)      7/1/2024    12:09 PM   Additional Questions   Roomed by Liliana     History of Present Illness       Hypertension: He presents for follow up of hypertension.  He does not check blood pressure  regularly outside of the clinic. Outside blood pressures have been over 140/90. He does not follow a low salt diet.     He eats 0-1 servings of fruits and vegetables daily.He consumes 1 sweetened beverage(s) daily.He exercises with enough effort to increase his heart rate 9 or less minutes per day.  He exercises with enough effort to increase his heart rate 3 or less days per week.   He is taking medications regularly.     Patient is a 63-year-old male presenting for hypertension follow-up.  Medical history includes HTN, lumbar radiculopathy, KAY, morbid obesity, hypokalemia, testicular cancer S/P right orchiectomy.      Hypertension previously managed with 50 mg atenolol, 40 mg lisinopril, and 25 mg spironolactone daily.  At last visit modified hypertension medications.  Started 20-25 mg lisinopril-hydrochlorothiazide to reduce pill burden, 5 mg amlodipine daily, and continuation of 25 mg spironolactone daily.  Patient has been taking lisinopril/hydrochlorothiazide and amlodipine daily.  Denies any side effects of medication.  Was taking spironolactone daily but ran out of medication 1 week ago and was unable  to get refill by previous prescriber.    Evaluated 6/19/2024 in the emergency room and was found to have infected sebaceous cyst to left lower back.  Cyst was draining spontaneously, no I&D required.  Culture was obtained but unable to see culture result in chart review.  Was prescribed 10-day course of Augmentin, has 1 day left.  Continues to have drainage.  Denies fever, pain, chills.  Unsure if redness has improved as it is hard to visualize posterior back.      Review of Systems  Review of systems negative otherwise known HPI.    Past Medical History:   Diagnosis Date    Essential hypertension     Testicle cancer (H)     age 23     Past Surgical History:   Procedure Laterality Date    RADICAL ORCHIECTOMY Right     age 23    TONSILLECTOMY, ADENOIDECTOMY, COMBINED Bilateral     When he was about 32     Family History   Problem Relation Age of Onset    Glasses (<7 y/o) Mother     Dementia Mother     Glasses (<7 y/o) Father     Heart Disease Father     Tuberculosis Maternal Grandfather     Heart Disease Paternal Grandmother     Cancer Paternal Grandfather      Social History     Tobacco Use    Smoking status: Never    Smokeless tobacco: Current     Types: Chew   Substance Use Topics    Alcohol use: Not Currently     Current Outpatient Medications   Medication Sig Dispense Refill    amLODIPine (NORVASC) 5 MG tablet Take 1 tablet (5 mg) by mouth daily 90 tablet 2    amoxicillin-clavulanate (AUGMENTIN) 875-125 MG tablet TAKE 1 TABLET BY MOUTH TWICE DAILY WITH MEALS FOR 10 DAYS      aspirin 81 MG EC tablet Take 81 mg by mouth daily      atorvastatin (LIPITOR) 80 MG tablet Take 1 tablet (80 mg) by mouth daily 90 tablet 3    ezetimibe (ZETIA) 10 MG tablet Take 1 tablet (10 mg) by mouth daily 90 tablet 3    lisinopril-hydrochlorothiazide (ZESTORETIC) 20-25 MG tablet Take 1 tablet by mouth daily 90 tablet 2    spironolactone (ALDACTONE) 25 MG tablet Take 1 tablet (25 mg) by mouth daily 90 tablet 2     "sulfamethoxazole-trimethoprim (BACTRIM DS) 800-160 MG tablet Take 1 tablet by mouth 2 times daily 14 tablet 0     No current facility-administered medications for this visit.       Objective    BP (!) 160/90 (BP Location: Right arm, Patient Position: Sitting, Cuff Size: Adult Large)   Pulse 93   Temp 97.7  F (36.5  C) (Temporal)   Resp 20   Ht 1.808 m (5' 11.18\")   Wt (!) 179.3 kg (395 lb 4.8 oz)   SpO2 94%   BMI 54.85 kg/m    Body mass index is 54.85 kg/m .  Physical Exam   General: Alert, oriented, no acute distress.    Respiratory: Easy work of breathing.   Cardiovascular: Appears warm and well-perfused.    Skin: Purulent drainage from cyst on left lower back with surrounding erythema.  Neurologic: Mentation intact and speech normal.     Psychiatric: Appropriate affect.     Signed Electronically by: THANIA Rasheed CNP    "

## 2024-07-02 ENCOUNTER — TELEPHONE (OUTPATIENT)
Dept: FAMILY MEDICINE | Facility: CLINIC | Age: 64
End: 2024-07-02
Payer: COMMERCIAL

## 2024-07-02 NOTE — TELEPHONE ENCOUNTER
Called patient and reviewed wound culture results.  No organisms seen.  Complete entire course of Augmentin.  Spironolactone and Bactrim have interaction with the potential to cause hyperkalemia.  Advised patient to continue to monitor for signs of infection including worsening redness, fever, chills, increased swelling, pain.  If the patient develops symptoms of infection will start patient on course of doxycycline as it has no interaction with spironolactone.  Recommended using warm compresses 20 minutes at a time to express cyst drainage.  Discussed treatment plan with patient.

## 2024-07-03 ENCOUNTER — OFFICE VISIT (OUTPATIENT)
Dept: PHYSICAL MEDICINE AND REHAB | Facility: CLINIC | Age: 64
End: 2024-07-03
Payer: COMMERCIAL

## 2024-07-03 VITALS
SYSTOLIC BLOOD PRESSURE: 168 MMHG | HEART RATE: 91 BPM | DIASTOLIC BLOOD PRESSURE: 87 MMHG | BODY MASS INDEX: 44.1 KG/M2 | HEIGHT: 71 IN | WEIGHT: 315 LBS

## 2024-07-03 DIAGNOSIS — M54.41 CHRONIC RIGHT-SIDED LOW BACK PAIN WITH RIGHT-SIDED SCIATICA: Primary | ICD-10-CM

## 2024-07-03 DIAGNOSIS — M54.16 RIGHT LUMBAR RADICULOPATHY: ICD-10-CM

## 2024-07-03 DIAGNOSIS — G89.29 CHRONIC RIGHT-SIDED LOW BACK PAIN WITH RIGHT-SIDED SCIATICA: Primary | ICD-10-CM

## 2024-07-03 LAB
BACTERIA FLD CULT: NORMAL
GRAM STAIN RESULT: NORMAL
GRAM STAIN RESULT: NORMAL

## 2024-07-03 PROCEDURE — 99204 OFFICE O/P NEW MOD 45 MIN: CPT | Performed by: NURSE PRACTITIONER

## 2024-07-03 ASSESSMENT — PAIN SCALES - GENERAL: PAINLEVEL: SEVERE PAIN (6)

## 2024-07-03 NOTE — PATIENT INSTRUCTIONS
~Spine Center Scheduling #[phone].  ~Please call our [facility] Sp[facility] Nurse Navigation #[phone] with any questions or concerns about your treatment plan, if symptoms worsen and you would like to be seen urgently, or if you have problems controlling bladder and bowel function.  ~For any future flareups or new symptoms, recommend follow-up in clinic or contact the nurse navigator line.  ~Please note that any My Chart messages may take multiple days for a response due to the high volume of patients seen in clinic.  Anything sent Thursday night or after will be answered the following week when able, as Cinthia Gaviria CNP does not work in clinic on Fridays.   ~Cinthia Gaviria CNP is at the [facility] on Tuesdays, otherwise primarily at the [facility].       ~You have been referred for Physical Therapy to [facility]. They will call you to schedule an appointment.       Scheduling phone number is [phone] for [facility] [place], [place], or [place] location.  If you have not heard from the scheduling office within 2 business days, please call [phone] for ALL other locations.     Discussed the importance of core strengthening, ROM, stretching exercises and how each of these entities is important in decreasing pain and improving long term spine health.  The purpose of physical therapy is to teach you an individualized home exercise program.  These exercises need to be performed every day in order to decrease pain and prevent future occurrences of pain.            Imaging has been ordered. Radiology will call you to schedule. Please call below if you do not hear from them in the next couple of days.     [facility] Radiology Scheduling    Please call [phone] to schedule your image(s) (select option #1). There are 3 different locations near, see below.   There are imaging options for other locations as well, the imaging  schedulers can help with other locations within [place].     [facility]  [address] [address]    [facility]  [address]   [place] [id number]    [place] [id number]

## 2024-07-03 NOTE — PROGRESS NOTES
ASSESSMENT: Stephen Vyas is a 63 year old male who presents for consultation at the request of PCP Mesha Rapp, with a past medical history significant for KAY, hyperlipidemia, hypertension, lumbar radiculopathy, history orchiectomy who presents today for new patient evaluation of:    -Intermittent right low back pain with right lumbar radiculopathy L4 and L5 dermatomal pattern with multiple severe flareups over the last 6 months.  Currently symptoms are more tolerable but he is concerned about this recurring.    -Obesity, patient is going to start working with weight management clinic for weight loss as well.    Patient is neurologically intact on exam. No myelopathic or red flag symptoms.          No data to display                     Diagnoses and all orders for this visit:  Chronic right-sided low back pain with right-sided sciatica  -     Orthopedic  Referral  -     MR Lumbar Spine w/o Contrast; Future  -     Physical Therapy  Referral; Future  Right lumbar radiculopathy  -     MR Lumbar Spine w/o Contrast; Future  -     Physical Therapy  Referral; Future    PLAN:  Reviewed spine anatomy and disease process. Discussed diagnosis and treatment options with the patient today. A shared decision making model was used.  The patient's values and choices were respected. The following represents what was discussed and decided upon by the provider and the patient.      -DIAGNOSTIC TESTS:    -- Ordered lumbar spine MRI to further evaluate lumbar radiculopathy.    -PHYSICAL THERAPY: Referral to physical therapy placed to address home exercises for lumbar core strengthening and nerve glides, in order to hopefully prevent future flareups.  Discussed the importance of core strengthening, ROM, stretching exercises with the patient and how each of these entities is important in decreasing pain.  Explained to the patient that the purpose of physical therapy is to teach the patient a home exercise  program.  These exercises need to be performed every day in order to decrease pain and prevent future occurrences of pain.        -MEDICATIONS: We did discuss considering muscle relaxant or prescription anti-inflammatories if flareups occur.  Currently he is doing okay and wants to hold off on these medications but will let us know if the flareup does occur and we could potentially send cyclobenzaprine and nabumetone.  Discussed multiple medication options today with patient. Discussed risks, side effects, and proper use of medications. Patient verbalized understanding.    -INTERVENTIONS: No recommendations for injections at this time as his pain is currently tolerable.  His goal is to prevent flareups in the future.  Discussed risks and benefits of injections with patient today.    -PATIENT EDUCATION:  Total time of 46 minutes, on the day of service, spent with the patient, reviewing the chart, placing orders, and documenting.     -FOLLOW-UP:   Follow-up [facility] message for imaging results.  No need for injections at this time.    Advised patient to call the Spine Center if symptoms worsen or you have problems controlling bladder and bowel function.   ______________________________________________________________________    SUBJECTIVE:  HPI:  Stephen Vyas  Is a 63 year old male who presents today for new patient evaluation of low back pain lower lumbar spine greater on the right that radiates to the right buttock lateral hip lateral and anterior thigh as well as posterior thigh as well as posterior calf with associated numbness and tingling intermittent for the last 6 months.  He does report that he has had 6 3 severe episodes of debilitating right leg pain that typically has lasted 10 to 20 days each over the last 6 months.  Currently his pain is tolerable at a 2/10 but it does get up to a 9/10 when he is having these flareups.  His biggest goal today is to learn about what is going on with his back to prevent  future flareups.  Otherwise patient denies left leg pain.  Denies any recent trips or falls or balance changes.  Denies bowel or bladder loss control.  Denies lower extremity weakness currently.    He has noticed weight gain has increased his back pain as well and is currently planning to work with weight loss management clinic in the next couple of weeks.    -Treatment to Date: No prior spinal surgery or spinal injections  Chiropractic care in the last 3 months with minimal lasting benefit    -Medications:  Not currently taking any medications for pain at this time    Current Outpatient Medications   Medication Sig Dispense Refill    amLODIPine (NORVASC) 5 MG tablet Take 1 tablet (5 mg) by mouth daily 90 tablet 2    amoxicillin-clavulanate (AUGMENTIN) 875-125 MG tablet TAKE 1 TABLET BY MOUTH TWICE DAILY WITH MEALS FOR 10 DAYS      aspirin 81 MG EC tablet Take 81 mg by mouth daily      atorvastatin (LIPITOR) 80 MG tablet Take 1 tablet (80 mg) by mouth daily 90 tablet 3    ezetimibe (ZETIA) 10 MG tablet Take 1 tablet (10 mg) by mouth daily 90 tablet 3    lisinopril-hydrochlorothiazide (ZESTORETIC) 20-25 MG tablet Take 1 tablet by mouth daily 90 tablet 2    spironolactone (ALDACTONE) 25 MG tablet Take 1 tablet (25 mg) by mouth daily 90 tablet 2     No current facility-administered medications for this visit.       Allergies   Allergen Reactions    Codeine      Other reaction(s): GI intolerance  Verified       Past Medical History:   Diagnosis Date    Essential hypertension     Testicle cancer (H)     age 23        Patient Active Problem List   Diagnosis    Primary hypertension    Presbyopia    Hypokalemia    Lipids abnormal    Lumbar radiculopathy    Class 3 severe obesity due to excess calories with body mass index (BMI) of 50.0 to 59.9 in adult, unspecified whether serious comorbidity present (H)    Obstructive sleep apnea syndrome    Pain in back    Spondylosis of lumbar spine    Status post orchiectomy    Mixed  "hyperlipidemia    Bilateral impacted cerumen       Past Surgical History:   Procedure Laterality Date    RADICAL ORCHIECTOMY Right     age 23    TONSILLECTOMY, ADENOIDECTOMY, COMBINED Bilateral     When he was about 32       Family History   Problem Relation Age of Onset    Glasses (<7 y/o) Mother     Dementia Mother     Glasses (<7 y/o) Father     Heart Disease Father     Tuberculosis Maternal Grandfather     Heart Disease Paternal Grandmother     Cancer Paternal Grandfather        Reviewed past medical, surgical, and family history with patient found on new patient intake packet located in EMR Media tab.     SOCIAL HX: Patient is , works as a /.  Patient does report chewing tobacco use.  Denies alcohol use, denies history being a heavy drinker, denies recreational drug use.      ROS: Positive for joint pain, sciatica. Specifically negative for bowel/bladder dysfunction, balance changes, headache, dizziness, foot drop, fevers, chills, appetite changes, nausea/vomiting, unexplained weight loss. Otherwise 13 systems reviewed are negative. Please see the patient's intake questionnaire from today for details.    OBJECTIVE:  BP (!) 168/87 (BP Location: Right arm, Patient Position: Sitting, Cuff Size: Adult Regular)   Pulse 91   Ht 5' 11.18\" (1.808 m)   Wt (!) 397 lb 3.2 oz (180.2 kg)   BMI 55.12 kg/m      PHYSICAL EXAMINATION:    --CONSTITUTIONAL:  Vital signs as above.  No acute distress.  The patient is well nourished and well groomed.  --PSYCHIATRIC:  Appropriate mood and affect. The patient is awake, alert, oriented to person, place, time and answering questions appropriately with clear speech.    --SKIN:  Skin over the face, bilateral lower extremities, and posterior torso is clean, dry, intact without rashes.    --RESPIRATORY: Normal rhythm and effort. No abnormal accessory muscle breathing patterns noted.   --STANDING EXAMINATION:  Normal lumbar lordosis noted, no lateral " shift.  --MUSCULOSKELETAL: Range of motion is not limited in lumbar flexion, extension, lateral rotation. No tenderness to palpation lumbar spine. Straight leg raising is negative to radicular pain. Sciatic notch non-tender.  --GROSS MOTOR: Gait is non-antalgic. Easily arises from a seated position.   --LOWER EXTREMITY MOTOR TESTING:  Plantar flexion left 5/5, right 5/5   Dorsiflexion left 5/5, right 5/5   Great toe MTP extension left 5/5, right 5/5  Knee flexion left 5/5, right 5/5  Knee extension left 5/5, right 5/5   Hip flexion left 5/5, right 5/5  --HIPS: Full range of motion bilaterally.   --NEUROLOGICAL:  1/4 patellar, medial hamstring, and achilles reflexes bilaterally.  Sensation to light touch is intact in the bilateral L4, L5, and S1 dermatomes.  Negative Kassandra reflex bilaterally.  --VASCULAR:  Bilateral lower extremities are warm.  There is trace pitting edema of the bilateral lower extremities.    RESULTS: Prior medical records from [facility] and Care Everywhere were reviewed today.

## 2024-07-03 NOTE — LETTER
7/3/2024      Stephen Vyas  [address]  [facility] [address]      Dear Colleague,    Thank you for referring your patient, Stephen Vyas, to the [facility]. Please see a copy of my visit note below.    ASSESSMENT: Stephen Vyas is a 63 year old male who presents for consultation at the request of PCP Mesha Rapp, with a past medical history significant for KAY, hyperlipidemia, hypertension, lumbar radiculopathy, history orchiectomy who presents today for new patient evaluation of:    -Intermittent right low back pain with right lumbar radiculopathy L4 and L5 dermatomal pattern with multiple severe flareups over the last 6 months.  Currently symptoms are more tolerable but he is concerned about this recurring.    -Obesity, patient is going to start working with weight management clinic for weight loss as well.    Patient is neurologically intact on exam. No myelopathic or red flag symptoms.          No data to display                     Diagnoses and all orders for this visit:  Chronic right-sided low back pain with right-sided sciatica  -     Orthopedic  Referral  -     MR Lumbar Spine w/o Contrast; Future  -     Physical Therapy  Referral; Future  Right lumbar radiculopathy  -     MR Lumbar Spine w/o Contrast; Future  -     Physical Therapy  Referral; Future    PLAN:  Reviewed spine anatomy and disease process. Discussed diagnosis and treatment options with the patient today. A shared decision making model was used.  The patient's values and choices were respected. The following represents what was discussed and decided upon by the provider and the patient.      -DIAGNOSTIC TESTS:    -- Ordered lumbar spine MRI to further evaluate lumbar radiculopathy.    -PHYSICAL THERAPY: Referral to physical therapy placed to address home exercises for lumbar core strengthening and nerve glides, in order to hopefully prevent future flareups.  Discussed the  importance of core strengthening, ROM, stretching exercises with the patient and how each of these entities is important in decreasing pain.  Explained to the patient that the purpose of physical therapy is to teach the patient a home exercise program.  These exercises need to be performed every day in order to decrease pain and prevent future occurrences of pain.        -MEDICATIONS: We did discuss considering muscle relaxant or prescription anti-inflammatories if flareups occur.  Currently he is doing okay and wants to hold off on these medications but will let us know if the flareup does occur and we could potentially send cyclobenzaprine and nabumetone.  Discussed multiple medication options today with patient. Discussed risks, side effects, and proper use of medications. Patient verbalized understanding.    -INTERVENTIONS: No recommendations for injections at this time as his pain is currently tolerable.  His goal is to prevent flareups in the future.  Discussed risks and benefits of injections with patient today.    -PATIENT EDUCATION:  Total time of 46 minutes, on the day of service, spent with the patient, reviewing the chart, placing orders, and documenting.     -FOLLOW-UP:   Follow-up [facility] message for imaging results.  No need for injections at this time.    Advised patient to call the Spine Center if symptoms worsen or you have problems controlling bladder and bowel function.   ______________________________________________________________________    SUBJECTIVE:  HPI:  Stephen Vyas  Is a 63 year old male who presents today for new patient evaluation of low back pain lower lumbar spine greater on the right that radiates to the right buttock lateral hip lateral and anterior thigh as well as posterior thigh as well as posterior calf with associated numbness and tingling intermittent for the last 6 months.  He does report that he has had 6 3 severe episodes of debilitating right leg pain that typically  has lasted 10 to 20 days each over the last 6 months.  Currently his pain is tolerable at a 2/10 but it does get up to a 9/10 when he is having these flareups.  His biggest goal today is to learn about what is going on with his back to prevent future flareups.  Otherwise patient denies left leg pain.  Denies any recent trips or falls or balance changes.  Denies bowel or bladder loss control.  Denies lower extremity weakness currently.    He has noticed weight gain has increased his back pain as well and is currently planning to work with weight loss management clinic in the next couple of weeks.    -Treatment to Date: No prior spinal surgery or spinal injections  Chiropractic care in the last 3 months with minimal lasting benefit    -Medications:  Not currently taking any medications for pain at this time    Current Outpatient Medications   Medication Sig Dispense Refill     amLODIPine (NORVASC) 5 MG tablet Take 1 tablet (5 mg) by mouth daily 90 tablet 2     amoxicillin-clavulanate (AUGMENTIN) 875-125 MG tablet TAKE 1 TABLET BY MOUTH TWICE DAILY WITH MEALS FOR 10 DAYS       aspirin 81 MG EC tablet Take 81 mg by mouth daily       atorvastatin (LIPITOR) 80 MG tablet Take 1 tablet (80 mg) by mouth daily 90 tablet 3     ezetimibe (ZETIA) 10 MG tablet Take 1 tablet (10 mg) by mouth daily 90 tablet 3     lisinopril-hydrochlorothiazide (ZESTORETIC) 20-25 MG tablet Take 1 tablet by mouth daily 90 tablet 2     spironolactone (ALDACTONE) 25 MG tablet Take 1 tablet (25 mg) by mouth daily 90 tablet 2     No current facility-administered medications for this visit.       Allergies   Allergen Reactions     Codeine      Other reaction(s): GI intolerance  Verified       Past Medical History:   Diagnosis Date     Essential hypertension      Testicle cancer (H)     age 23        Patient Active Problem List   Diagnosis     Primary hypertension     Presbyopia     Hypokalemia     Lipids abnormal     Lumbar radiculopathy     Class 3  "severe obesity due to excess calories with body mass index (BMI) of 50.0 to 59.9 in adult, unspecified whether serious comorbidity present (H)     Obstructive sleep apnea syndrome     Pain in back     Spondylosis of lumbar spine     Status post orchiectomy     Mixed hyperlipidemia     Bilateral impacted cerumen       Past Surgical History:   Procedure Laterality Date     RADICAL ORCHIECTOMY Right     age 23     TONSILLECTOMY, ADENOIDECTOMY, COMBINED Bilateral     When he was about 32       Family History   Problem Relation Age of Onset     Glasses (<9 y/o) Mother      Dementia Mother      Glasses (<9 y/o) Father      Heart Disease Father      Tuberculosis Maternal Grandfather      Heart Disease Paternal Grandmother      Cancer Paternal Grandfather        Reviewed past medical, surgical, and family history with patient found on new patient intake packet located in EMR Media tab.     SOCIAL HX: Patient is , works as a /.  Patient does report chewing tobacco use.  Denies alcohol use, denies history being a heavy drinker, denies recreational drug use.      ROS: Positive for joint pain, sciatica. Specifically negative for bowel/bladder dysfunction, balance changes, headache, dizziness, foot drop, fevers, chills, appetite changes, nausea/vomiting, unexplained weight loss. Otherwise 13 systems reviewed are negative. Please see the patient's intake questionnaire from today for details.    OBJECTIVE:  BP (!) 168/87 (BP Location: Right arm, Patient Position: Sitting, Cuff Size: Adult Regular)   Pulse 91   Ht 5' 11.18\" (1.808 m)   Wt (!) 397 lb 3.2 oz (180.2 kg)   BMI 55.12 kg/m      PHYSICAL EXAMINATION:    --CONSTITUTIONAL:  Vital signs as above.  No acute distress.  The patient is well nourished and well groomed.  --PSYCHIATRIC:  Appropriate mood and affect. The patient is awake, alert, oriented to person, place, time and answering questions appropriately with clear speech.    --SKIN:  Skin " over the face, bilateral lower extremities, and posterior torso is clean, dry, intact without rashes.    --RESPIRATORY: Normal rhythm and effort. No abnormal accessory muscle breathing patterns noted.   --STANDING EXAMINATION:  Normal lumbar lordosis noted, no lateral shift.  --MUSCULOSKELETAL: Range of motion is not limited in lumbar flexion, extension, lateral rotation. No tenderness to palpation lumbar spine. Straight leg raising is negative to radicular pain. Sciatic notch non-tender.  --GROSS MOTOR: Gait is non-antalgic. Easily arises from a seated position.   --LOWER EXTREMITY MOTOR TESTING:  Plantar flexion left 5/5, right 5/5   Dorsiflexion left 5/5, right 5/5   Great toe MTP extension left 5/5, right 5/5  Knee flexion left 5/5, right 5/5  Knee extension left 5/5, right 5/5   Hip flexion left 5/5, right 5/5  --HIPS: Full range of motion bilaterally.   --NEUROLOGICAL:  1/4 patellar, medial hamstring, and achilles reflexes bilaterally.  Sensation to light touch is intact in the bilateral L4, L5, and S1 dermatomes.  Negative Kassandra reflex bilaterally.  --VASCULAR:  Bilateral lower extremities are warm.  There is trace pitting edema of the bilateral lower extremities.    RESULTS: Prior medical records from [facility] and Care Everywhere were reviewed today.                 Again, thank you for allowing me to participate in the care of your patient.        Sincerely,        Cinthia Gaviria, CNP

## 2024-07-06 ENCOUNTER — HOSPITAL ENCOUNTER (EMERGENCY)
Facility: HOSPITAL | Age: 64
Discharge: STILL ADMITTED OR ADMINISTRATIVE | End: 2024-07-06
Attending: STUDENT IN AN ORGANIZED HEALTH CARE EDUCATION/TRAINING PROGRAM | Admitting: STUDENT IN AN ORGANIZED HEALTH CARE EDUCATION/TRAINING PROGRAM
Payer: COMMERCIAL

## 2024-07-06 ENCOUNTER — HOSPITAL ENCOUNTER (INPATIENT)
Facility: HOSPITAL | Age: 64
LOS: 3 days | Discharge: HOME | End: 2024-07-09
Attending: STUDENT IN AN ORGANIZED HEALTH CARE EDUCATION/TRAINING PROGRAM | Admitting: STUDENT IN AN ORGANIZED HEALTH CARE EDUCATION/TRAINING PROGRAM
Payer: COMMERCIAL

## 2024-07-06 DIAGNOSIS — I10 PRIMARY HYPERTENSION: Primary | ICD-10-CM

## 2024-07-06 PROBLEM — R55 PRE-SYNCOPE: Status: ACTIVE | Noted: 2024-07-06

## 2024-07-06 PROBLEM — R42 DIZZINESS: Status: ACTIVE | Noted: 2024-07-06

## 2024-07-06 PROBLEM — E87.6 HYPOKALEMIA: Status: ACTIVE | Noted: 2023-11-07

## 2024-07-06 LAB
ANION GAP SERPL CALCULATED.3IONS-SCNC: 11 MMOL/L (ref 7–15)
BUN SERPL-MCNC: 19.7 MG/DL (ref 8–23)
CALCIUM SERPL-MCNC: 8.4 MG/DL (ref 8.8–10.2)
CHLORIDE SERPL-SCNC: 101 MMOL/L (ref 98–107)
CREAT SERPL-MCNC: 1.2 MG/DL (ref 0.67–1.17)
DEPRECATED HCO3 PLAS-SCNC: 30 MMOL/L (ref 22–29)
EGFRCR SERPLBLD CKD-EPI 2021: 69 ML/MIN/1.73M2
ERYTHROCYTE [DISTWIDTH] IN BLOOD BY AUTOMATED COUNT: 13.9 % (ref 10–15)
GLUCOSE SERPL-MCNC: 99 MG/DL (ref 70–99)
HCT VFR BLD AUTO: 41.7 % (ref 40–53)
HGB BLD-MCNC: 14.1 G/DL (ref 13.3–17.7)
MAGNESIUM SERPL-MCNC: 1.8 MG/DL (ref 1.7–2.3)
MCH RBC QN AUTO: 28.5 PG (ref 26.5–33)
MCHC RBC AUTO-ENTMCNC: 33.8 G/DL (ref 31.5–36.5)
MCV RBC AUTO: 84 FL (ref 78–100)
PHOSPHATE SERPL-MCNC: 3.1 MG/DL (ref 2.5–4.5)
PLATELET # BLD AUTO: 326 10E3/UL (ref 150–450)
POTASSIUM SERPL-SCNC: 3 MMOL/L (ref 3.4–5.3)
RBC # BLD AUTO: 4.95 10E6/UL (ref 4.4–5.9)
SODIUM SERPL-SCNC: 142 MMOL/L (ref 135–145)
TROPONIN T SERPL HS-MCNC: 20 NG/L
WBC # BLD AUTO: 10.2 10E3/UL (ref 4–11)

## 2024-07-06 PROCEDURE — 250N000011 HC RX IP 250 OP 636: Performed by: STUDENT IN AN ORGANIZED HEALTH CARE EDUCATION/TRAINING PROGRAM

## 2024-07-06 PROCEDURE — 120N000004 HC R&B MS OVERFLOW

## 2024-07-06 PROCEDURE — 99223 1ST HOSP IP/OBS HIGH 75: CPT | Performed by: STUDENT IN AN ORGANIZED HEALTH CARE EDUCATION/TRAINING PROGRAM

## 2024-07-06 PROCEDURE — 84100 ASSAY OF PHOSPHORUS: CPT | Performed by: STUDENT IN AN ORGANIZED HEALTH CARE EDUCATION/TRAINING PROGRAM

## 2024-07-06 PROCEDURE — 36415 COLL VENOUS BLD VENIPUNCTURE: CPT | Performed by: STUDENT IN AN ORGANIZED HEALTH CARE EDUCATION/TRAINING PROGRAM

## 2024-07-06 PROCEDURE — 83735 ASSAY OF MAGNESIUM: CPT | Performed by: STUDENT IN AN ORGANIZED HEALTH CARE EDUCATION/TRAINING PROGRAM

## 2024-07-06 PROCEDURE — 85027 COMPLETE CBC AUTOMATED: CPT | Performed by: STUDENT IN AN ORGANIZED HEALTH CARE EDUCATION/TRAINING PROGRAM

## 2024-07-06 PROCEDURE — 80048 BASIC METABOLIC PNL TOTAL CA: CPT | Performed by: STUDENT IN AN ORGANIZED HEALTH CARE EDUCATION/TRAINING PROGRAM

## 2024-07-06 PROCEDURE — 250N000013 HC RX MED GY IP 250 OP 250 PS 637: Performed by: STUDENT IN AN ORGANIZED HEALTH CARE EDUCATION/TRAINING PROGRAM

## 2024-07-06 PROCEDURE — 84484 ASSAY OF TROPONIN QUANT: CPT | Performed by: STUDENT IN AN ORGANIZED HEALTH CARE EDUCATION/TRAINING PROGRAM

## 2024-07-06 RX ORDER — ONDANSETRON 4 MG/1
4 TABLET, ORALLY DISINTEGRATING ORAL EVERY 6 HOURS PRN
Status: DISCONTINUED | OUTPATIENT
Start: 2024-07-06 | End: 2024-07-09 | Stop reason: HOSPADM

## 2024-07-06 RX ORDER — ATORVASTATIN CALCIUM 40 MG/1
80 TABLET, FILM COATED ORAL DAILY
Status: DISCONTINUED | OUTPATIENT
Start: 2024-07-07 | End: 2024-07-09 | Stop reason: HOSPADM

## 2024-07-06 RX ORDER — MAGNESIUM SULFATE HEPTAHYDRATE 40 MG/ML
2 INJECTION, SOLUTION INTRAVENOUS ONCE
Status: COMPLETED | OUTPATIENT
Start: 2024-07-06 | End: 2024-07-06

## 2024-07-06 RX ORDER — HYDRALAZINE HYDROCHLORIDE 20 MG/ML
10 INJECTION INTRAMUSCULAR; INTRAVENOUS EVERY 4 HOURS PRN
Status: DISCONTINUED | OUTPATIENT
Start: 2024-07-06 | End: 2024-07-09 | Stop reason: HOSPADM

## 2024-07-06 RX ORDER — ONDANSETRON 2 MG/ML
4 INJECTION INTRAMUSCULAR; INTRAVENOUS EVERY 6 HOURS PRN
Status: DISCONTINUED | OUTPATIENT
Start: 2024-07-06 | End: 2024-07-09 | Stop reason: HOSPADM

## 2024-07-06 RX ORDER — AMLODIPINE BESYLATE 5 MG/1
5 TABLET ORAL DAILY
Status: DISCONTINUED | OUTPATIENT
Start: 2024-07-07 | End: 2024-07-07

## 2024-07-06 RX ORDER — EZETIMIBE 10 MG/1
10 TABLET ORAL DAILY
Status: DISCONTINUED | OUTPATIENT
Start: 2024-07-07 | End: 2024-07-09 | Stop reason: HOSPADM

## 2024-07-06 RX ORDER — CALCIUM CARBONATE 500 MG/1
1000 TABLET, CHEWABLE ORAL 4 TIMES DAILY PRN
Status: DISCONTINUED | OUTPATIENT
Start: 2024-07-06 | End: 2024-07-09 | Stop reason: HOSPADM

## 2024-07-06 RX ORDER — ASPIRIN 81 MG/1
81 TABLET ORAL DAILY
Status: DISCONTINUED | OUTPATIENT
Start: 2024-07-07 | End: 2024-07-09 | Stop reason: HOSPADM

## 2024-07-06 RX ORDER — POTASSIUM CHLORIDE 1500 MG/1
40 TABLET, EXTENDED RELEASE ORAL ONCE
Status: COMPLETED | OUTPATIENT
Start: 2024-07-06 | End: 2024-07-06

## 2024-07-06 RX ORDER — AMOXICILLIN 250 MG
1 CAPSULE ORAL 2 TIMES DAILY PRN
Status: DISCONTINUED | OUTPATIENT
Start: 2024-07-06 | End: 2024-07-09 | Stop reason: HOSPADM

## 2024-07-06 RX ORDER — LIDOCAINE 40 MG/G
CREAM TOPICAL
Status: DISCONTINUED | OUTPATIENT
Start: 2024-07-06 | End: 2024-07-09 | Stop reason: HOSPADM

## 2024-07-06 RX ORDER — POTASSIUM CHLORIDE 1500 MG/1
20 TABLET, EXTENDED RELEASE ORAL ONCE
Status: COMPLETED | OUTPATIENT
Start: 2024-07-06 | End: 2024-07-06

## 2024-07-06 RX ORDER — AMOXICILLIN 250 MG
2 CAPSULE ORAL 2 TIMES DAILY PRN
Status: DISCONTINUED | OUTPATIENT
Start: 2024-07-06 | End: 2024-07-09 | Stop reason: HOSPADM

## 2024-07-06 RX ORDER — LISINOPRIL 20 MG/1
20 TABLET ORAL DAILY
Status: DISCONTINUED | OUTPATIENT
Start: 2024-07-07 | End: 2024-07-09 | Stop reason: HOSPADM

## 2024-07-06 RX ORDER — HYDRALAZINE HYDROCHLORIDE 10 MG/1
10 TABLET, FILM COATED ORAL EVERY 4 HOURS PRN
Status: DISCONTINUED | OUTPATIENT
Start: 2024-07-06 | End: 2024-07-09 | Stop reason: HOSPADM

## 2024-07-06 RX ADMIN — POTASSIUM CHLORIDE 40 MEQ: 1500 TABLET, EXTENDED RELEASE ORAL at 21:20

## 2024-07-06 RX ADMIN — POTASSIUM CHLORIDE 20 MEQ: 1500 TABLET, EXTENDED RELEASE ORAL at 23:13

## 2024-07-06 RX ADMIN — MAGNESIUM SULFATE HEPTAHYDRATE 2 G: 40 INJECTION, SOLUTION INTRAVENOUS at 21:21

## 2024-07-06 ASSESSMENT — ACTIVITIES OF DAILY LIVING (ADL)
ADLS_ACUITY_SCORE: 20

## 2024-07-06 NOTE — ED NOTES
"Expected Patient Referral to ED  6:03 PM    Referring Clinic/Provider:  [facility]    Reason for referral/Clinical facts:  Near syncope with diaphoresis at 3pm, no chest pain or SOB. VS WNL, no infectious symptoms, K 2.5, patient with history of hypokalemia previously, troponin normal, repeat underway, ddimer negative. Per them \"we called the hospitalist\" but no call back and did not replete potassium. I advised caller that we typically replete potassium and make sure it improves/normalizes, they will consider treating patient with potassium.    Recommendations provided:  Consider further workup/management in current clinical setting (clinic, urgent care, etc)    Caller was informed that this institution does possess the capabilities and/or resources to provide for patient and should be transferred to our facility.    Discussed that if direct admit is sought and any hurdles are encountered, this ED would be happy to see the patient and evaluate.    Informed caller that recommendations provided are recommendations based only on the facts provided and that they responsible to accept or reject the advice, or to seek a formal in person consultation as needed and that this ED will see/treat patient should they arrive.      Laura Peoples MD  [facility] EMERGENCY DEPARTMENT  [address] [place] [id number] [phone]       Laura Peoples MD  07/06/24 5551    "

## 2024-07-07 ENCOUNTER — APPOINTMENT (OUTPATIENT)
Dept: OCCUPATIONAL THERAPY | Facility: HOSPITAL | Age: 64
End: 2024-07-07
Attending: STUDENT IN AN ORGANIZED HEALTH CARE EDUCATION/TRAINING PROGRAM
Payer: COMMERCIAL

## 2024-07-07 ENCOUNTER — APPOINTMENT (OUTPATIENT)
Dept: ULTRASOUND IMAGING | Facility: HOSPITAL | Age: 64
End: 2024-07-07
Attending: INTERNAL MEDICINE
Payer: COMMERCIAL

## 2024-07-07 ENCOUNTER — APPOINTMENT (OUTPATIENT)
Dept: PHYSICAL THERAPY | Facility: HOSPITAL | Age: 64
End: 2024-07-07
Attending: STUDENT IN AN ORGANIZED HEALTH CARE EDUCATION/TRAINING PROGRAM
Payer: COMMERCIAL

## 2024-07-07 LAB
ALBUMIN SERPL BCG-MCNC: 3.7 G/DL (ref 3.5–5.2)
ALP SERPL-CCNC: 80 U/L (ref 40–150)
ALT SERPL W P-5'-P-CCNC: 38 U/L (ref 0–70)
ANION GAP SERPL CALCULATED.3IONS-SCNC: 8 MMOL/L (ref 7–15)
AST SERPL W P-5'-P-CCNC: 33 U/L (ref 0–45)
BILIRUB SERPL-MCNC: 0.4 MG/DL
BUN SERPL-MCNC: 20.9 MG/DL (ref 8–23)
CA-I BLD-MCNC: 4.4 MG/DL (ref 4.4–5.2)
CALCIUM SERPL-MCNC: 8.6 MG/DL (ref 8.8–10.2)
CHLORIDE SERPL-SCNC: 101 MMOL/L (ref 98–107)
CORTIS SERPL-MCNC: 8.3 UG/DL
CREAT SERPL-MCNC: 1.19 MG/DL (ref 0.67–1.17)
DEPRECATED HCO3 PLAS-SCNC: 32 MMOL/L (ref 22–29)
EGFRCR SERPLBLD CKD-EPI 2021: 69 ML/MIN/1.73M2
ERYTHROCYTE [DISTWIDTH] IN BLOOD BY AUTOMATED COUNT: 14.1 % (ref 10–15)
GLUCOSE BLDC GLUCOMTR-MCNC: 104 MG/DL (ref 70–99)
GLUCOSE BLDC GLUCOMTR-MCNC: 89 MG/DL (ref 70–99)
GLUCOSE SERPL-MCNC: 116 MG/DL (ref 70–99)
HCT VFR BLD AUTO: 38.9 % (ref 40–53)
HGB BLD-MCNC: 13.1 G/DL (ref 13.3–17.7)
HOLD SPECIMEN: NORMAL
HOLD SPECIMEN: NORMAL
MAGNESIUM SERPL-MCNC: 2.2 MG/DL (ref 1.7–2.3)
MCH RBC QN AUTO: 28.5 PG (ref 26.5–33)
MCHC RBC AUTO-ENTMCNC: 33.7 G/DL (ref 31.5–36.5)
MCV RBC AUTO: 85 FL (ref 78–100)
PHOSPHATE SERPL-MCNC: 3.2 MG/DL (ref 2.5–4.5)
PLATELET # BLD AUTO: 281 10E3/UL (ref 150–450)
POTASSIUM SERPL-SCNC: 2.9 MMOL/L (ref 3.4–5.3)
POTASSIUM SERPL-SCNC: 2.9 MMOL/L (ref 3.4–5.3)
POTASSIUM SERPL-SCNC: 3.1 MMOL/L (ref 3.4–5.3)
POTASSIUM SERPL-SCNC: 3.2 MMOL/L (ref 3.4–5.3)
PROT SERPL-MCNC: 6.7 G/DL (ref 6.4–8.3)
RBC # BLD AUTO: 4.59 10E6/UL (ref 4.4–5.9)
SODIUM SERPL-SCNC: 141 MMOL/L (ref 135–145)
TSH SERPL DL<=0.005 MIU/L-ACNC: 1.03 UIU/ML (ref 0.3–4.2)
WBC # BLD AUTO: 8.7 10E3/UL (ref 4–11)

## 2024-07-07 PROCEDURE — 99254 IP/OBS CNSLTJ NEW/EST MOD 60: CPT | Performed by: INTERNAL MEDICINE

## 2024-07-07 PROCEDURE — 93010 ELECTROCARDIOGRAM REPORT: CPT | Performed by: GENERAL ACUTE CARE HOSPITAL

## 2024-07-07 PROCEDURE — 82330 ASSAY OF CALCIUM: CPT | Performed by: INTERNAL MEDICINE

## 2024-07-07 PROCEDURE — 250N000013 HC RX MED GY IP 250 OP 250 PS 637: Performed by: STUDENT IN AN ORGANIZED HEALTH CARE EDUCATION/TRAINING PROGRAM

## 2024-07-07 PROCEDURE — 83735 ASSAY OF MAGNESIUM: CPT | Performed by: STUDENT IN AN ORGANIZED HEALTH CARE EDUCATION/TRAINING PROGRAM

## 2024-07-07 PROCEDURE — 82088 ASSAY OF ALDOSTERONE: CPT | Performed by: INTERNAL MEDICINE

## 2024-07-07 PROCEDURE — 97535 SELF CARE MNGMENT TRAINING: CPT | Mod: GO

## 2024-07-07 PROCEDURE — 84100 ASSAY OF PHOSPHORUS: CPT | Performed by: STUDENT IN AN ORGANIZED HEALTH CARE EDUCATION/TRAINING PROGRAM

## 2024-07-07 PROCEDURE — 84132 ASSAY OF SERUM POTASSIUM: CPT | Performed by: STUDENT IN AN ORGANIZED HEALTH CARE EDUCATION/TRAINING PROGRAM

## 2024-07-07 PROCEDURE — 99233 SBSQ HOSP IP/OBS HIGH 50: CPT | Performed by: INTERNAL MEDICINE

## 2024-07-07 PROCEDURE — 36415 COLL VENOUS BLD VENIPUNCTURE: CPT | Performed by: INTERNAL MEDICINE

## 2024-07-07 PROCEDURE — 84132 ASSAY OF SERUM POTASSIUM: CPT | Performed by: INTERNAL MEDICINE

## 2024-07-07 PROCEDURE — 36415 COLL VENOUS BLD VENIPUNCTURE: CPT | Performed by: STUDENT IN AN ORGANIZED HEALTH CARE EDUCATION/TRAINING PROGRAM

## 2024-07-07 PROCEDURE — 85027 COMPLETE CBC AUTOMATED: CPT | Performed by: STUDENT IN AN ORGANIZED HEALTH CARE EDUCATION/TRAINING PROGRAM

## 2024-07-07 PROCEDURE — 5A09357 ASSISTANCE WITH RESPIRATORY VENTILATION, LESS THAN 24 CONSECUTIVE HOURS, CONTINUOUS POSITIVE AIRWAY PRESSURE: ICD-10-PCS | Performed by: INTERNAL MEDICINE

## 2024-07-07 PROCEDURE — 93975 VASCULAR STUDY: CPT

## 2024-07-07 PROCEDURE — 80053 COMPREHEN METABOLIC PANEL: CPT | Performed by: STUDENT IN AN ORGANIZED HEALTH CARE EDUCATION/TRAINING PROGRAM

## 2024-07-07 PROCEDURE — 97162 PT EVAL MOD COMPLEX 30 MIN: CPT | Mod: GP

## 2024-07-07 PROCEDURE — 84244 ASSAY OF RENIN: CPT | Performed by: INTERNAL MEDICINE

## 2024-07-07 PROCEDURE — 97530 THERAPEUTIC ACTIVITIES: CPT | Mod: GP

## 2024-07-07 PROCEDURE — 82384 ASSAY THREE CATECHOLAMINES: CPT | Performed by: INTERNAL MEDICINE

## 2024-07-07 PROCEDURE — 120N000004 HC R&B MS OVERFLOW

## 2024-07-07 PROCEDURE — 82533 TOTAL CORTISOL: CPT | Performed by: INTERNAL MEDICINE

## 2024-07-07 PROCEDURE — 250N000013 HC RX MED GY IP 250 OP 250 PS 637: Performed by: INTERNAL MEDICINE

## 2024-07-07 PROCEDURE — 97165 OT EVAL LOW COMPLEX 30 MIN: CPT | Mod: GO

## 2024-07-07 PROCEDURE — 999N000157 HC STATISTIC RCP TIME EA 10 MIN

## 2024-07-07 PROCEDURE — 93005 ELECTROCARDIOGRAM TRACING: CPT

## 2024-07-07 PROCEDURE — 250N000011 HC RX IP 250 OP 636: Performed by: STUDENT IN AN ORGANIZED HEALTH CARE EDUCATION/TRAINING PROGRAM

## 2024-07-07 PROCEDURE — 84443 ASSAY THYROID STIM HORMONE: CPT | Performed by: INTERNAL MEDICINE

## 2024-07-07 RX ORDER — POTASSIUM CHLORIDE 1500 MG/1
20 TABLET, EXTENDED RELEASE ORAL ONCE
Status: COMPLETED | OUTPATIENT
Start: 2024-07-07 | End: 2024-07-07

## 2024-07-07 RX ORDER — AMLODIPINE BESYLATE 5 MG/1
10 TABLET ORAL DAILY
Status: DISCONTINUED | OUTPATIENT
Start: 2024-07-08 | End: 2024-07-09 | Stop reason: HOSPADM

## 2024-07-07 RX ORDER — POTASSIUM CHLORIDE 1500 MG/1
40 TABLET, EXTENDED RELEASE ORAL ONCE
Status: COMPLETED | OUTPATIENT
Start: 2024-07-07 | End: 2024-07-07

## 2024-07-07 RX ORDER — SPIRONOLACTONE 25 MG/1
25 TABLET ORAL DAILY
Status: DISCONTINUED | OUTPATIENT
Start: 2024-07-07 | End: 2024-07-09

## 2024-07-07 RX ORDER — CLONIDINE HYDROCHLORIDE 0.1 MG/1
0.1 TABLET ORAL EVERY 6 HOURS PRN
Status: DISCONTINUED | OUTPATIENT
Start: 2024-07-07 | End: 2024-07-09 | Stop reason: HOSPADM

## 2024-07-07 RX ADMIN — SPIRONOLACTONE 25 MG: 25 TABLET ORAL at 14:25

## 2024-07-07 RX ADMIN — POTASSIUM CHLORIDE 40 MEQ: 1500 TABLET, EXTENDED RELEASE ORAL at 19:56

## 2024-07-07 RX ADMIN — POTASSIUM CHLORIDE 20 MEQ: 1500 TABLET, EXTENDED RELEASE ORAL at 07:42

## 2024-07-07 RX ADMIN — ASPIRIN 81 MG: 81 TABLET, COATED ORAL at 07:44

## 2024-07-07 RX ADMIN — HYDRALAZINE HYDROCHLORIDE 10 MG: 20 INJECTION INTRAMUSCULAR; INTRAVENOUS at 12:20

## 2024-07-07 RX ADMIN — AMLODIPINE BESYLATE 5 MG: 5 TABLET ORAL at 07:43

## 2024-07-07 RX ADMIN — LISINOPRIL 20 MG: 20 TABLET ORAL at 07:42

## 2024-07-07 RX ADMIN — ATORVASTATIN CALCIUM 80 MG: 40 TABLET, FILM COATED ORAL at 07:43

## 2024-07-07 RX ADMIN — POTASSIUM CHLORIDE 40 MEQ: 1500 TABLET, EXTENDED RELEASE ORAL at 13:35

## 2024-07-07 RX ADMIN — EZETIMIBE 10 MG: 10 TABLET ORAL at 07:43

## 2024-07-07 RX ADMIN — POTASSIUM CHLORIDE 40 MEQ: 1500 TABLET, EXTENDED RELEASE ORAL at 06:21

## 2024-07-07 ASSESSMENT — ACTIVITIES OF DAILY LIVING (ADL)
ADLS_ACUITY_SCORE: 20

## 2024-07-07 NOTE — PHARMACY-ADMISSION MEDICATION HISTORY
Pharmacist Admission Medication History    Admission medication history is complete. The information provided in this note is only as accurate as the sources available at the time of the update.    Information Source(s): Patient, Clinic records, Hospital records, and [facility]    Pertinent Information:    Patient stated that he completed a 10 day course of Augmentin 2-3 days ago.  Moreover, he stated that the clinic contacted him stating that he was not to start the Bactrim prescription due to a possible drug interaction.  Last dose of spironolactone was about 2 weeks ago     Changes made to PTA medication list:  Added: None  Deleted: Augmentin  Changed: None    Allergies reviewed with patient and updates made in EHR: yes    Medication History Completed By: Kel Lilly RPH 7/6/2024 8:19 PM    PTA Med List   Medication Sig Last Dose    amLODIPine (NORVASC) 5 MG tablet Take 1 tablet (5 mg) by mouth daily 7/6/2024    aspirin 81 MG EC tablet Take 81 mg by mouth daily 7/6/2024    atorvastatin (LIPITOR) 80 MG tablet Take 1 tablet (80 mg) by mouth daily 7/6/2024    ezetimibe (ZETIA) 10 MG tablet Take 1 tablet (10 mg) by mouth daily 7/6/2024    lisinopril-hydrochlorothiazide (ZESTORETIC) 20-25 MG tablet Take 1 tablet by mouth daily 7/6/2024    spironolactone (ALDACTONE) 25 MG tablet Take 1 tablet (25 mg) by mouth daily about 2 weeks ago

## 2024-07-07 NOTE — PROGRESS NOTES
/83 and hydralazine 10 mg iv given . Cardiology consult and lab work ordered. EKG done. Denied pain.

## 2024-07-07 NOTE — PLAN OF CARE
Problem: Comorbidity Management  Goal: Blood Pressure in Desired Range  7/7/2024 1009 by Alexsandra Marroquin, RN  Outcome: Progressing  7/7/2024 0746 by Alexsandra Marroquin, RN  Outcome: Progressing   Goal Outcome Evaluation:  Patient alert and oriented x 4. BP elevated this morning and gave BP medications early this morning. /64 an hour after BP medications given. Patient with no c/o pain, no c/o dizziness. Potassium supplement given and next level around noon. NSR. Patient has his won CPAP here. He said he is compliant with medications at home. Voided in hat 400 ml this morning. Call light in reach.

## 2024-07-07 NOTE — PHARMACY-ADMISSION MEDICATION HISTORY
Pharmacist Admission Medication History    Admission medication history is complete. The information provided in this note is only as accurate as the sources available at the time of the update.    Information Source(s): Patient, Clinic records, Hospital records, and [facility]    Pertinent Information:     Patient stated that he completed a 10 day course of Augmentin 2-3 days ago.  Moreover, he stated that the clinic contacted him and stated that he was not to start the Bactrim prescription due to a possible drug interaction.    Changes made to PTA medication list:  Added: None  Deleted: Augmentin  Changed: None    Allergies reviewed with patient and updates made in EHR: yes    Medication History Completed By: Kel Lilly RPH 7/6/2024 8:14 PM    PTA Med List   Medication Sig Last Dose    amLODIPine (NORVASC) 5 MG tablet Take 1 tablet (5 mg) by mouth daily 7/6/2024    aspirin 81 MG EC tablet Take 81 mg by mouth daily 7/6/2024    atorvastatin (LIPITOR) 80 MG tablet Take 1 tablet (80 mg) by mouth daily 7/6/2024    ezetimibe (ZETIA) 10 MG tablet Take 1 tablet (10 mg) by mouth daily 7/6/2024    lisinopril-hydrochlorothiazide (ZESTORETIC) 20-25 MG tablet Take 1 tablet by mouth daily 7/6/2024    spironolactone (ALDACTONE) 25 MG tablet Take 1 tablet (25 mg) by mouth daily 7/6/2024

## 2024-07-07 NOTE — PLAN OF CARE
Problem: Electrolyte Imbalance  Goal: Electrolyte Balance  Outcome: Progressing   Goal Outcome Evaluation:           Pt is A&O x4. Pt was transferred from the Urgency Room in [place] due to low potassium and high BP. Pt denies pain and SOB. Pt is on magnesium and potassium protocol. Check potassium at 3AM and magnesium in the morning. Pt's wife brought CPAP from home.

## 2024-07-07 NOTE — PLAN OF CARE
Problem: Comorbidity Management  Goal: Blood Pressure in Desired Range  Outcome: Progressing   Goal Outcome Evaluation:  Came on to elevated BP and morning BP meds given early. Patient said he does have HTN and takes his meds for BP everyday. Will monitor BP. Denied pain. His own CPAP on as he rested in bed. NSR. Call light in reach.

## 2024-07-07 NOTE — CONSULTS
HEART CARE CONSULTATON NOTE        Assessment/Recommendations   Assessment/Plan:  Hypertension   Severe hypokalemia, after stopping spironolactone with hydrochlorothiazide use.  ? Hyperaldosteronism.   Near syncope  MELO  Intermittent diaphoresis  Morbid obesity       Plan:   Screen for hyperaldosteronism (ordered)  Screen for Pheo  Recommend renal artery ultrasound.    Continue amlodipine and lisinopril   Check TSH, FT4.   Continue telemetry  Complete echo given LVH and poor r wave progression.   8. Restart prior blood pressure regiment that worked in past see below.    - Restart aldactone 25 mg daily, increase lisinopril to 40 mg tomorrow, increase amlodipine to 10 mg daily.  Restart atenolol tomorrow at 25 mg daily.     Blood pressure was previously controlled on below regiment:   Amlodipine 10 mg daily  Atenolol 50 mg daily  Spironolactone 25 mg daily.   Lisinopril 40 mg daily.       Screen for secondary hypertension:   - Renal doppler ultrasound: No  - Potassium: Hypokalemia  - Symptoms of pheo: Diaphoresis.    Medication:   - Nonsteroidal antiinflammatory agents (NSAIDs) use: No  - Antidepressants: No  - Corticosteroids: No  - Decongestants, such as phenylephrine and pseudoephedrine: No  - Some weight-loss medications: No  - Sodium-containing antacids: No  - Erythropoietin: No  - Cyclosporine or tacrolimus: No  - Stimulants, including methylphenidate and amphetamines: No   - Atypical antipsychotics, including clozapine and olanzapine: No  - Angiogenesis inhibitors, such as bevacizumab: No   - Tyrosine kinase inhibitors, such as sunitinib and sorafenib:No        Clinically Significant Risk Factors Present on Admission        # Hypokalemia: Lowest K = 2.9 mmol/L in last 2 days, will replace as needed  # Hypocalcemia: Lowest Ca = 8.4 mg/dL in last 2 days, will monitor and replace as appropriate     # Drug Induced Platelet Defect: home medication list includes an antiplatelet medication   # Hypertension: Noted on  problem list   # Severe Obesity: Estimated body mass index is 53.4 kg/m  as calculated from the following:    Height as of this encounter: 1.829 m (6').    Weight as of this encounter: 178.6 kg (393 lb 11.2 oz).                    History of Present Illness/Subjective    HPI: Stephen Vyas is a 63 year old male with morbid obesity, hypertension who presented to [facility] with intermittent episodes of lightheadedness without syncope, diaphoresis, fatigue but have severe hypokalemia.    Patient states for the past 2 months after his blood pressure regiment was changed he has been suffering from variable blood pressure readings.  Recently 2 weeks ago his spironolactone ran out and he was off medication.  Since that time has been noticing variations in his blood pressure, intermittent palpitations and occasional lightheadedness.  He was evaluated in the emergency department due to severe hypokalemia and hypomagnesemia noted at urgent care.    On conversation with the patient.  Appears has been on lisinopril, amlodipine, atenolol and Aldactone with controlled blood pressure.  After he was seen a few months ago for acute pain his blood pressure regiment was adjusted and hydrochlorothiazide was added.       Physical Examination     VITALS: BP (!) 175/83 (BP Location: Left arm, Patient Position: Chair, Cuff Size: Adult Large)   Pulse 82   Temp 98.1  F (36.7  C) (Oral)   Resp 18   Ht 1.829 m (6')   Wt (!) 178.6 kg (393 lb 11.2 oz)   SpO2 91%   BMI 53.40 kg/m    BMI: Body mass index is 53.4 kg/m .  Wt Readings from Last 3 Encounters:   07/07/24 (!) 178.6 kg (393 lb 11.2 oz)   07/03/24 (!) 180.2 kg (397 lb 3.2 oz)   07/01/24 (!) 179.3 kg (395 lb 4.8 oz)       Intake/Output Summary (Last 24 hours) at 7/7/2024 1325  Last data filed at 7/7/2024 1300  Gross per 24 hour   Intake 1793 ml   Output 1275 ml   Net 518 ml     General Appearance:   no distress, normal body habitus   ENT/Mouth: membranes moist, no oral  "lesions or bleeding gums.      EYES:  no scleral icterus, normal conjunctivae   Neck: no carotid bruits or thyromegaly   Chest/Lungs:   lungs are clear to auscultation, no rales or wheezing, no sternal scar, equal chest wall expansion    Cardiovascular:   Regular. Normal first and second heart sounds with no murmurs, rubs, or gallops; the carotid, radial and posterior tibial pulses are intact, Jugular venous pressure normal, trace edema bilaterally    Abdomen:  no organomegaly, masses, bruits, or tenderness; bowel sounds are present   Extremities: no cyanosis or clubbing   Skin: no xanthelasma, warm.    Neurologic: normal  bilateral, no tremors     Psychiatric: alert and oriented x3, calm           Lab Results    Chemistry/lipid CBC Cardiac Enzymes/BNP/TSH/INR   Recent Labs   Lab Test 06/14/24  1228   CHOL 160   HDL 25*   *   TRIG 142     Recent Labs   Lab Test 06/14/24 1228 09/28/22  1623   * 133*     Recent Labs   Lab Test 07/07/24  1233 07/07/24  0802 07/07/24  0338   NA  --   --  141   POTASSIUM 3.1*  --  2.9*  2.9*   CHLORIDE  --   --  101   CO2  --   --  32*   GLC  --  104* 116*   BUN  --   --  20.9   CR  --   --  1.19*   GFRESTIMATED  --   --  69   MISSAEL  --   --  8.6*     Recent Labs   Lab Test 07/07/24 0338 07/06/24 1934 06/14/24  1228   CR 1.19* 1.20* 1.18*     Recent Labs   Lab Test 06/14/24  1228 09/28/22  1623   A1C 5.6 5.6          Recent Labs   Lab Test 07/07/24  0338   WBC 8.7   HGB 13.1*   HCT 38.9*   MCV 85        Recent Labs   Lab Test 07/07/24  0338 07/06/24  1934 06/14/24  1228   HGB 13.1* 14.1 14.4    No results for input(s): \"TROPONINI\" in the last 58474 hours.  No results for input(s): \"BNP\", \"NTBNPI\", \"NTBNP\" in the last 22246 hours.  No results for input(s): \"TSH\" in the last 60659 hours.  No results for input(s): \"INR\" in the last 63353 hours.     Medical History  Surgical History Family History Social History   Past Medical History:   Diagnosis Date    Essential " hypertension     Testicle cancer (H)     age 23     Past Surgical History:   Procedure Laterality Date    RADICAL ORCHIECTOMY Right     age 23    TONSILLECTOMY, ADENOIDECTOMY, COMBINED Bilateral     When he was about 32     Family History   Problem Relation Age of Onset    Glasses (<9 y/o) Mother     Dementia Mother     Glasses (<9 y/o) Father     Heart Disease Father     Tuberculosis Maternal Grandfather     Heart Disease Paternal Grandmother     Cancer Paternal Grandfather         Social History     Socioeconomic History    Marital status:      Spouse name: Not on file    Number of children: Not on file    Years of education: Not on file    Highest education level: Not on file   Occupational History    Not on file   Tobacco Use    Smoking status: Never    Smokeless tobacco: Current     Types: Chew   Vaping Use    Vaping status: Never Used   Substance and Sexual Activity    Alcohol use: Not Currently    Drug use: Not Currently    Sexual activity: Yes   Other Topics Concern    Not on file   Social History Narrative    Not on file     Social Determinants of Health     Financial Resource Strain: Low Risk  (6/14/2024)    Financial Resource Strain     Within the past 12 months, have you or your family members you live with been unable to get utilities (heat, electricity) when it was really needed?: No   Food Insecurity: Low Risk  (6/14/2024)    Food Insecurity     Within the past 12 months, did you worry that your food would run out before you got money to buy more?: No     Within the past 12 months, did the food you bought just not last and you didn t have money to get more?: No   Transportation Needs: Low Risk  (6/14/2024)    Transportation Needs     Within the past 12 months, has lack of transportation kept you from medical appointments, getting your medicines, non-medical meetings or appointments, work, or from getting things that you need?: No   Physical Activity: Inactive (6/14/2024)    Exercise Vital Sign      Days of Exercise per Week: 0 days     Minutes of Exercise per Session: 0 min   Stress: No Stress Concern Present (6/14/2024)    [employer] [place] [employer] - Occupational Stress Questionnaire     Feeling of Stress : Only a little   Social Connections: Unknown (6/14/2024)    Social Connection and Isolation Panel [NHANES]     Frequency of Communication with Friends and Family: Not on file     Frequency of Social Gatherings with Friends and Family: Three times a week     Attends Restorationist Services: Not on file     Active Member of Clubs or Organizations: Not on file     Attends Club or Organization Meetings: Not on file     Marital Status: Not on file   Interpersonal Safety: Low Risk  (6/14/2024)    Interpersonal Safety     Do you feel physically and emotionally safe where you currently live?: Yes     Within the past 12 months, have you been hit, slapped, kicked or otherwise physically hurt by someone?: No     Within the past 12 months, have you been humiliated or emotionally abused in other ways by your partner or ex-partner?: No   Housing Stability: Low Risk  (6/14/2024)    Housing Stability     Do you have housing? : Yes     Are you worried about losing your housing?: No         Medications  Allergies   No current outpatient medications on file.        Allergies   Allergen Reactions    Codeine      Other reaction(s): GI intolerance  Verified         Joe Vaca, DO

## 2024-07-07 NOTE — PLAN OF CARE
Problem: Adult Inpatient Plan of Care  Goal: Plan of Care Review  Description: The Plan of Care Review/Shift note should be completed every shift.  The Outcome Evaluation is a brief statement about your assessment that the patient is improving, declining, or no change.  This information will be displayed automatically on your shift  note.  Outcome: Progressing   Goal Outcome Evaluation:         Up ambulating in [place] independent, denies dizziness or lightheaded

## 2024-07-07 NOTE — PLAN OF CARE
Problem: Electrolyte Imbalance  Goal: Electrolyte Balance  Outcome: Not Progressing      Problem: Comorbidity Management  Goal: Blood Pressure in Desired Range  Outcome: Not Progressing    Goal outcome evaluation:         Potassium recheck was 2.9. On potassium replacement protocol. Complaint of dizziness at shift change that went away on it's own. BP elevated, 163/74 and 176/87. Not meeting the parameter for PRN hydralazine. Denied any pain. PT/OT to see.

## 2024-07-07 NOTE — H&P
[facility]    History and Physical - Hospitalist Service       Date of Admission:  7/6/2024    Assessment & Plan    Assessment:  Stephen Vyas is a 63 year old male with a past medical history documented below presented to the urgency care with complaint of presyncope, palpitations, dizziness, diaphoresis, labs at urgent care were significant for hypokalemia and MELO, patient received medication potassium and fluids and subsequently was transferred to [facility] for further evaluation and management    Problem list and plan:  Electrolyte abnormality/severe hypokalemia  Dehydration  MELO  Presented today to emergency care with dizziness, diaphoresis, palpitations, presyncope where labs showed hypokalemia and MELO, patient received fluids and potassium in the ED  Will continue with fluid resuscitation and replete potassium  Monitor renal function closely  Suspecting most likely in the setting of hydrochlorothiazide use which the patient is stating recently started, advised the patient to discuss with PCP to using alternative blood pressure medication for blood pressure control and will advised to continue holding on discharge  Patient also has not taken spironolactone for around 2 weeks  Will hold spironolactone for now as well  Monitor potassium levels closely  Monitor on cardiac telemetry monitoring  Magnesium repleted  Check orthostatic vitals  PT and OT evaluation and fall precautions    History of hypertension  Monitor vital signs as per protocol  IV hydralazine as needed for elevated blood pressure  Continue with lisinopril, amlodipine, holding hydrochlorothiazide and will advised to continue holding hydrochlorothiazide on discharge as above  Holding spironolactone, can restart spironolactone tomorrow post fluid resuscitation    History of hyperlipidemia  Continue with aspirin atorvastatin and Zetia    DVT PPx  Intermittent pneumatic compression    CODE STATUS  Full code as per  discussion with patient        Diet: Combination Diet Regular Diet Adult    DVT Prophylaxis: Pneumatic Compression Devices  Kennedy Catheter: Not present  Lines: None     Cardiac Monitoring: ACTIVE order. Indication: Electrolyte Imbalance (24 hours)- Magnesium <1.3 mg/ml; Potassium < =2.8 or > 5.5 mg/ml  Code Status: Full Code    Clinically Significant Risk Factors Present on Admission        # Hypokalemia: Lowest K = 3 mmol/L in last 2 days, will replace as needed         # Drug Induced Platelet Defect: home medication list includes an antiplatelet medication   # Hypertension: Noted on problem list               # Severe Obesity: Estimated body mass index is 53.12 kg/m  as calculated from the following:    Height as of this encounter: 1.829 m (6').    Weight as of this encounter: 177.7 kg (391 lb 11.2 oz).            Disposition Plan     Medically Ready for Discharge:  Full code as per discussion with the patient, as per patient she does want to be resuscitated but only if she can recover and she does not want to continue on a ventilator in a vegetative state.Anticipated Tomorrow     Saad J. Gondal, MD  Hospitalist Service  [facility]  Securely message with [employer] (more info)  Text page via [facility] Paging/Directory     ______________________________________________________________________    Chief Complaint   Presyncope, palpitations, dizziness, diaphoresis    History is obtained from the patient    History of Present Illness   Stephen Vyas is a 63 year old male with a past medical history documented below presented to the urgency care with complaint of presyncope, palpitations, dizziness, diaphoresis, labs at urgent care were significant for hypokalemia and MELO, patient received medication potassium and fluids and subsequently was transferred to [facility] for further evaluation and management    Past Medical History    Past Medical History:   Diagnosis Date    Essential hypertension      Testicle cancer (H)     age 23       Past Surgical History   Past Surgical History:   Procedure Laterality Date    RADICAL ORCHIECTOMY Right     age 23    TONSILLECTOMY, ADENOIDECTOMY, COMBINED Bilateral     When he was about 32       Prior to Admission Medications   Prior to Admission Medications   Prescriptions Last Dose Informant Patient Reported? Taking?   amLODIPine (NORVASC) 5 MG tablet 7/6/2024  No Yes   Sig: Take 1 tablet (5 mg) by mouth daily   aspirin 81 MG EC tablet 7/6/2024  Yes Yes   Sig: Take 81 mg by mouth daily   atorvastatin (LIPITOR) 80 MG tablet 7/6/2024  No Yes   Sig: Take 1 tablet (80 mg) by mouth daily   ezetimibe (ZETIA) 10 MG tablet 7/6/2024  No Yes   Sig: Take 1 tablet (10 mg) by mouth daily   lisinopril-hydrochlorothiazide (ZESTORETIC) 20-25 MG tablet 7/6/2024  No Yes   Sig: Take 1 tablet by mouth daily   spironolactone (ALDACTONE) 25 MG tablet about 2 weeks ago  No Yes   Sig: Take 1 tablet (25 mg) by mouth daily      Facility-Administered Medications: None        Review of Systems    The 10 point Review of Systems is negative other than noted in the HPI or here. Presyncope, palpitations, dizziness, diaphoresis    Physical Exam   Vital Signs: Temp: 98.2  F (36.8  C) Temp src: Oral BP: (!) 162/80 Pulse: 80   Resp: 16 SpO2: 94 % O2 Device: None (Room air)    Weight: 391 lbs 11.2 oz    GENERAL: Patient was seen and examined at bedside with no acute distress, morbidly obese  HENT:  Head is normocephalic, atraumatic.   EYES:  Eyes have anicteric sclerae without conjunctival injection   LUNGS: Bilateral air entry, clear to auscultation bilaterally  CARDIOVASCULAR:  Regular rate and rhythm with no murmurs, gallops or rubs.  ABDOMEN:  Normal bowel sounds. Soft; nontender   EXT: no edema    SKIN:  No acute rashes.   NEUROLOGIC:  Grossly nonfocal.      Medical Decision Making       80 MINUTES SPENT BY ME on the date of service doing chart review, history, exam, documentation & further activities  per the note.      Data     I have personally reviewed the following data over the past 24 hrs:    10.2  \   14.1   / 326     142 101 19.7 /  99   3.0 (L) 30 (H) 1.20 (H) \     Trop: 20 BNP: N/A       Imaging results reviewed over the past 24 hrs:   No results found for this or any previous visit (from the past 24 hour(s)).

## 2024-07-07 NOTE — PROGRESS NOTES
07/07/24 1315   Appointment Info   Signing Clinician's Name / Credentials (PT) Brendon Medina, PT, DPT   Rehab Comments (PT) Patient left sitting in bedside recliner, indep with mobility   Living Environment   People in Home spouse;child(pepe), adult   Current Living Arrangements house   Home Accessibility stairs within home   Number of Stairs, Within Home, Primary eight   Stair Railings, Within Home, Primary   (one rail)   Living Environment Comments split level home. walk in shower   Self-Care   Equipment Currently Used at Home none   Fall history within last six months no   Activity/Exercise/Self-Care Comment Patient is typically indep with mobility without AD. Reports increased hip and knee pain that has been limiting his ability to walk the distances he used to. Also reports 2 bouts of sciatica that really limited his mobility. He has an MRI scheduled for lumbar and hip and plans to go to OPPT after that. Patient is typically indep with all bADL and IADL.   General Information   Onset of Illness/Injury or Date of Surgery 07/06/24   Referring Physician Gondal, Saad J, MD   Pertinent History of Current Problem (include personal factors and/or comorbidities that impact the POC) Electrolyte abnormality, severe hypokalemia, Possible hyperaldosteronism, Possible dehydration, MELO, Orthostatic Sx:  Flushing, Intermittent diaphoresis, Palpitations   Existing Precautions/Restrictions no known precautions/restrictions   Integumentary/Edema   Integumentary/Edema Comments BLE edema with pitting in some areas   Range of Motion (ROM)   Range of Motion ROM deficits secondary to pain   Strength (Manual Muscle Testing)   Strength (Manual Muscle Testing) Deficits observed during functional mobility   Bed Mobility   Comment, (Bed Mobility) patient in recliner at beginning and end of session   Transfers   Transfers sit-stand transfer   Sit-Stand Transfer   Sit-Stand [place] (Transfers) independent   Gait/Stairs (Locomotion)    [place] Level (Gait) independent   Distance in Feet (Gait) 200   Negotiation (Stairs) stairs independence;handrail location;number of steps   [place] Level (Stairs) modified independence   Handrail Location (Stairs) right side (ascending)   Number of Steps (Stairs) 8   Clinical Impression   Criteria for Skilled Therapeutic Intervention Yes, treatment indicated   PT Diagnosis (PT) impaired functional mobility   Influenced by the following impairments BLE edema, impaired balance, pain   Functional limitations due to impairments impaired gait, impaired stairs   Clinical Presentation (PT Evaluation Complexity) evolving   Clinical Presentation Rationale clinical judgement   Clinical Decision Making (Complexity) moderate complexity   Planned Therapy Interventions (PT) patient/family education   Risk & Benefits of therapy have been explained evaluation/treatment results reviewed;care plan/treatment goals reviewed;participants voiced agreement with care plan;participants included;patient   PT Total Evaluation Time   PT Eval, Moderate Complexity Minutes (46257) 8   Physical Therapy Goals   PT Frequency One time eval and treatment only   PT Predicted Duration/Target Date for Goal Attainment 07/07/24   PT Goals Transfers;Gait;Stairs   PT: Transfers Independent;Goal Met   PT: Gait Independent;100 feet;Goal Met   PT: Stairs Modified independent;8 stairs;Goal Met  (one rail)   Interventions   Interventions Quick Adds Therapeutic Activity   Therapeutic Activity   Therapeutic Activities: dynamic activities to improve functional performance Minutes (57856) 8   Treatment Detail/Skilled Intervention Discussed BLE pitting edema and potential for PT/OT edema eval in the future if patient feels edema remains. Discussed importance of continuing to ambulate distances while admitted, elevate BLE as needed for edema management. Patient reports typically his legs will be edematous by the end of the day but then he wakes up in the  morning without any edema present. Discussed his hip/knee/back pain and potential for aquatic therapy in the future to ease his joint pain and allow him to continue exercising. Patient denied further questions, in agreement to have acute PT sign off.   PT Discharge Planning   PT Plan eval/treat and dc - indep on the unit   PT Discharge Recommendation (DC Rec) home;home with outpatient physical therapy   PT Rationale for DC Rec Patient is at his baseline from a mobility standpoint and does not present with any acute PT needs. Patient plans to attend OPPT following his lumbar/hip MRI, no PT recommended prior to MRI. Did discuss potential for OP PT/OT edema eval for BLE pitting edema if patient is interested, no interest currently. Acute PT will sign off.   PT Brief overview of current status indep   Total Session Time   Timed Code Treatment Minutes 8   Total Session Time (sum of timed and untimed services) 16

## 2024-07-07 NOTE — PROGRESS NOTES
Occupational Therapy Discharge Summary    Reason for therapy discharge:    All goals and outcomes met, no further needs identified.    Progress towards therapy goal(s). See goals on Care Plan in [facility] electronic health record for goal details.  Goals met    Therapy recommendation(s):    No further OT is recommended.    Tamra Jang, OTR/L 7/7/2024

## 2024-07-07 NOTE — PLAN OF CARE
Physical Therapy Discharge Summary    Reason for therapy discharge:    All goals and outcomes met, no further needs identified.    Progress towards therapy goal(s). See goals on Care Plan in Epic electronic health record for goal details.  Goals met    Therapy recommendation(s):    No further therapy is recommended. Patient plans to attend OPPT after his MRI is completed.

## 2024-07-07 NOTE — PROGRESS NOTES
"Occupational Therapy     07/07/24 0930   Appointment Info   Signing Clinician's Name / Credentials (OT) Tamra Jang OTR/L   Living Environment   People in Home spouse;child(pepe), adult   Current Living Arrangements house   Home Accessibility stairs within home   Living Environment Comments Split-level home; WIS; STS with vanity nearby   Self-Care   Usual Activity Tolerance moderate   Current Activity Tolerance moderate   Regular Exercise No  (Pt is planning to start OP PT after this hospitilization)   Fall history within last six months no   Activity/Exercise/Self-Care Comment Independent   Instrumental Activities of Daily Living (IADL)   IADL Comments Shared household responsibilities with spouse; Pt works FT, [employer]   General Information   Onset of Illness/Injury or Date of Surgery 07/06/24   Referring Physician Gondal, Saad J, MD   Patient/Family Therapy Goal Statement (OT) home; be healther/lose weight   Additional Occupational Profile Info/Pertinent History of Current Problem Per chart: \"63 year old male with a past medical history documented below presented to the urgency care with complaint of presyncope, palpitations, dizziness, diaphoresis, labs at urgent care were significant for hypokalemia and MELO, patient received medication potassium and fluids and subsequently was transferred to [facility] for further evaluation and management.\"   Cognitive Status Examination   Orientation Status orientation to person, place and time   Affect/Mental Status (Cognitive) WNL   Range of Motion Comprehensive   General Range of Motion no range of motion deficits identified   Strength Comprehensive (MMT)   General Manual Muscle Testing (MMT) Assessment no strength deficits identified   Comment, General Manual Muscle Testing (MMT) Assessment BUE strength is WFL   Bed Mobility   Bed Mobility supine-sit   Supine-Sit [place] (Bed Mobility) modified independence   Transfers   Transfers sit-stand transfer   Sit-Stand " Transfer   Sit-Stand [place] (Transfers) modified independence   Activities of Daily Living   BADL Assessment/Intervention grooming;lower body dressing;toileting   Lower Body Dressing Assessment/Training   Comment, (Lower Body Dressing) clinical judgment/pt demos technique for donning socks   [place] Level (Lower Body Dressing) lower body dressing skills;modified independence   Grooming Assessment/Training   [place] Level (Grooming) grooming skills;modified independence   Toileting   Comment, (Toileting) clinical judgment   [place] Level (Toileting) toileting skills;modified independence   Clinical Impression   Criteria for Skilled Therapeutic Interventions Met (OT) Yes, treatment indicated   OT Diagnosis Decr ADLs   OT Problem List-Impairments impacting ADL activity tolerance impaired;strength   Assessment of Occupational Performance 1-3 Performance Deficits   Identified Performance Deficits dressing, transfers, household management   Planned Therapy Interventions (OT) ADL retraining;progressive activity/exercise;home program guidelines   Clinical Decision Making Complexity (OT) problem focused assessment/low complexity   Risk & Benefits of therapy have been explained evaluation/treatment results reviewed;care plan/treatment goals reviewed;patient   OT Total Evaluation Time   OT Eval, Low Complexity Minutes (66046) 10   OT Goals   Therapy Frequency (OT) One time eval and treatment   OT Predicted Duration/Target Date for Goal Attainment 07/07/24   OT Goals Lower Body Dressing;Bed Mobility;Transfers   OT: Lower Body Dressing Modified independent   OT: Bed Mobility Modified independent   OT: Transfer Modified independent   Interventions   Interventions Quick Adds Self-Care/Home Management   Self-Care/Home Management   Self-Care/Home Mgmt/ADL, Compensatory, Meal Prep Minutes (99166) 10   Symptoms Noted During/After Treatment (Meal Preparation/Planning Training) none   Treatment Detail/Skilled  Intervention Eval complete, treatment initiated. Pt completed bed mob supine>sit, Mod I. Fxl transfers in session, Mod I without AD. Fxl mob in room with Mod I, no AD. Pt demo's technqiue for LB dressing, Mod I. Educ on increasing activity level/exercise in order to facilitate improved performance/participation in ADLs/IADLs; pt verbalized understanding. Educ on equipment recs for home- i.e., toilet riser, as well as where to purchase- pt verbalized understanding. No further needs.   OT Discharge Planning   OT Plan D/C OT   OT Discharge Recommendation (DC Rec) home with assist   OT Rationale for DC Rec Pt is at/near ADL baseline- Mod I for ADLs and fxl mob in room; supportive family.   OT Brief overview of current status Mod I   OT Equipment Needed at Discharge raised toilet seat   Total Session Time   Timed Code Treatment Minutes 10   Total Session Time (sum of timed and untimed services) 20     Tamra Jang OTR/L 7/7/2024

## 2024-07-07 NOTE — PROGRESS NOTES
[facility]    Hospitalist Progress Note    Date of Service (when I saw the patient): 07/07/2024    Assessment & Plan   Stephen Vyas is a pleasant 63 year old gentleman with history of HTN, dyslipidemia and recurrent hypokalemia.  He presented to the urgency care in last several days for evaluation of lightheadedness, flushing, palpitations.  Labs showed hypokalemia and MELO; Stephen was treated with potassium and fluids and was then transferred to [facility] for further evaluation and management  Current problems include:     Electrolyte abnormality  Recurrent ( > 1 year) severe hypokalemia  Possible hyperaldosteronism  Possible dehydration  MELO  - continue K replacement  - repeat BMP 7/8  - continue telemetry     Hx of hypertension, difficult to control.  Orthostatic Sx  Flushing  Intermittent diaphoresis  Palpitations  - unclear cause for this combination of Sx; possible pheochromocytoma  - Cardiology consult; reviewed w/ Dr. Vaca  - telemetry  - check ECG now  - continue PRN IV hydralazine  - add PRN clonidine  - await Cardiology review of current/recent antihypertensives (Stephen has had multiple changes to his meds during the past 2 weeks and past year)  -> appreciate Cardiology consult; will:  restart aldactone 25 mg daily  increase lisinopril to 40 mg tomorrow, 7/8  increase amlodipine to 10 mg daily.  restart atenolol 7/8 at 25 mg daily.     - per Cardiology, will screen for:  - hyperaldosteronism  - Pheochromocytoma  - check renal artery ultrasound.     - check TSH, FT4.   - check complete Echo given EKG findings: LVH and poor r wave progression.     - check orthostatic vitals  - PT and OT evaluation and fall precautions     History of dyslipidemia  Obesity  - continue with aspirin, and atorvastatin and Zetia (begun ~ 2 weeks ago)    Regular/daily tobacco (chewing) use  - abstain in future, if able     DVT PPx  - intermittent pneumatic compression        Diet: Combination  Diet Regular Diet Adult    DVT Prophylaxis: Pneumatic Compression Devices  Kennedy Catheter: Not present  Lines: None     Cardiac Monitoring: ACTIVE order. Indication: Electrolyte Imbalance (24 hours)- Magnesium <1.3 mg/ml; Potassium < =2.8 or > 5.5 mg/ml  Code Status: Full Code    Disposition: Expected discharge in 1-2 days    Medically ready for discharge: 1-2 days.        CARROLL Bagley MD, [facility] Hospitalist  Text Page (7am - 6pm)    Interval History   Reviewed w/ CLEIA, w/ Stephen and w/ Dr. Vaca of Cardiology.  Had recurrent palpitations/flushing/lightheadedness late last night and/or early this a.m., at rest.  Multiple episodes in last 3 days.  No f/c/SOB.  No chest pain.  No falls.  No leg swelling.  In last year has had: recurrent hypokalemia, uncontrolled HTN.     Multiple cardiac risk factors: strong family Hx (father/multiple uncles w/ CAD), uncontrolled HTN, chewing tobacco, obesity, hyperglycemia, dyslipidemia    Data reviewed today: I reviewed all new labs and imaging results over the last 24 hours.     Physical Exam   Temp: 98.1  F (36.7  C) Temp src: Oral BP: (!) 175/83 Pulse: 82   Resp: 18 SpO2: 91 % O2 Device: BiPAP/CPAP    Vitals:    07/06/24 1900 07/07/24 0524   Weight: (!) 177.7 kg (391 lb 11.2 oz) (!) 178.6 kg (393 lb 11.2 oz)     Vital Signs with Ranges  Temp:  [97.8  F (36.6  C)-98.5  F (36.9  C)] 98.1  F (36.7  C)  Pulse:  [64-83] 82  Resp:  [16-20] 18  BP: (133-183)/(64-99) 175/83  SpO2:  [90 %-96 %] 91 %  I/O last 3 completed shifts:  In: 773 [P.O.:720; I.V.:53]  Out: 475 [Urine:475]    Constitutional: awake, no apparent distress; sitting up in chair  HEENT: sclerae clear; MM's moist  Respiratory: good a/e bilaterally, no wheezing or rhonchi  Cardiovascular: regular rate and rhythm, S1, S2 noted; no m/r/g  GI: abdomen obese, + bowel sounds; soft, non-tender, non-distended  Skin/Integumen: bandage over skin tag/treated area on Left mid-back; no rashes, no cyanosis, no  jaundice  Musculoskeletal: trace edema bilateral ankles  Neurologic: follows directions well; no focal deficits      Medications   Current Facility-Administered Medications   Medication Dose Route Frequency Provider Last Rate Last Admin     Current Facility-Administered Medications   Medication Dose Route Frequency Provider Last Rate Last Admin    [START ON 7/8/2024] amLODIPine (NORVASC) tablet 10 mg  10 mg Oral Daily Joe Vaca DO        aspirin EC tablet 81 mg  81 mg Oral Daily Gondal, Saad J, MD   81 mg at 07/07/24 0744    atorvastatin (LIPITOR) tablet 80 mg  80 mg Oral Daily Gondal, Saad J, MD   80 mg at 07/07/24 0743    ezetimibe (ZETIA) tablet 10 mg  10 mg Oral Daily Gondal, Saad J, MD   10 mg at 07/07/24 0743    lisinopril (ZESTRIL) tablet 20 mg  20 mg Oral Daily Gondal, Saad J, MD   20 mg at 07/07/24 0742    sodium chloride (PF) 0.9% PF flush 3 mL  3 mL Intracatheter Q8H Gondal, Saad J, MD   3 mL at 07/07/24 0744    spironolactone (ALDACTONE) tablet 25 mg  25 mg Oral Daily Joe Vaca DO   25 mg at 07/07/24 1425       Data   Recent Labs   Lab 07/07/24  1233 07/07/24  0802 07/07/24  0338 07/06/24  1934   WBC  --   --  8.7 10.2   HGB  --   --  13.1* 14.1   MCV  --   --  85 84   PLT  --   --  281 326   NA  --   --  141 142   POTASSIUM 3.1*  --  2.9*  2.9* 3.0*   CHLORIDE  --   --  101 101   CO2  --   --  32* 30*   BUN  --   --  20.9 19.7   CR  --   --  1.19* 1.20*   ANIONGAP  --   --  8 11   MISSAEL  --   --  8.6* 8.4*   GLC  --  104* 116* 99   ALBUMIN  --   --  3.7  --    PROTTOTAL  --   --  6.7  --    BILITOTAL  --   --  0.4  --    ALKPHOS  --   --  80  --    ALT  --   --  38  --    AST  --   --  33  --

## 2024-07-08 ENCOUNTER — APPOINTMENT (OUTPATIENT)
Dept: CARDIOLOGY | Facility: HOSPITAL | Age: 64
End: 2024-07-08
Attending: INTERNAL MEDICINE
Payer: COMMERCIAL

## 2024-07-08 LAB
ATRIAL RATE - MUSE: 83 BPM
DIASTOLIC BLOOD PRESSURE - MUSE: NORMAL MMHG
INTERPRETATION ECG - MUSE: NORMAL
LVEF ECHO: NORMAL
P AXIS - MUSE: 28 DEGREES
POTASSIUM SERPL-SCNC: 3.3 MMOL/L (ref 3.4–5.3)
POTASSIUM SERPL-SCNC: 3.6 MMOL/L (ref 3.4–5.3)
PR INTERVAL - MUSE: 208 MS
QRS DURATION - MUSE: 108 MS
QT - MUSE: 436 MS
QTC - MUSE: 512 MS
R AXIS - MUSE: -25 DEGREES
SYSTOLIC BLOOD PRESSURE - MUSE: NORMAL MMHG
T AXIS - MUSE: 135 DEGREES
VENTRICULAR RATE- MUSE: 83 BPM

## 2024-07-08 PROCEDURE — 999N000157 HC STATISTIC RCP TIME EA 10 MIN

## 2024-07-08 PROCEDURE — 99233 SBSQ HOSP IP/OBS HIGH 50: CPT | Performed by: INTERNAL MEDICINE

## 2024-07-08 PROCEDURE — 99232 SBSQ HOSP IP/OBS MODERATE 35: CPT | Performed by: INTERNAL MEDICINE

## 2024-07-08 PROCEDURE — 250N000013 HC RX MED GY IP 250 OP 250 PS 637: Performed by: INTERNAL MEDICINE

## 2024-07-08 PROCEDURE — 84132 ASSAY OF SERUM POTASSIUM: CPT | Performed by: INTERNAL MEDICINE

## 2024-07-08 PROCEDURE — 255N000002 HC RX 255 OP 636: Performed by: INTERNAL MEDICINE

## 2024-07-08 PROCEDURE — 120N000004 HC R&B MS OVERFLOW

## 2024-07-08 PROCEDURE — 250N000013 HC RX MED GY IP 250 OP 250 PS 637: Performed by: STUDENT IN AN ORGANIZED HEALTH CARE EDUCATION/TRAINING PROGRAM

## 2024-07-08 PROCEDURE — 93306 TTE W/DOPPLER COMPLETE: CPT | Mod: 26 | Performed by: INTERNAL MEDICINE

## 2024-07-08 PROCEDURE — 36415 COLL VENOUS BLD VENIPUNCTURE: CPT | Performed by: INTERNAL MEDICINE

## 2024-07-08 PROCEDURE — C8929 TTE W OR WO FOL WCON,DOPPLER: HCPCS

## 2024-07-08 RX ORDER — POTASSIUM CHLORIDE 1500 MG/1
40 TABLET, EXTENDED RELEASE ORAL ONCE
Status: COMPLETED | OUTPATIENT
Start: 2024-07-08 | End: 2024-07-08

## 2024-07-08 RX ORDER — POTASSIUM CHLORIDE 1500 MG/1
20 TABLET, EXTENDED RELEASE ORAL ONCE
Status: COMPLETED | OUTPATIENT
Start: 2024-07-08 | End: 2024-07-08

## 2024-07-08 RX ORDER — HYDRALAZINE HYDROCHLORIDE 10 MG/1
10 TABLET, FILM COATED ORAL EVERY 8 HOURS SCHEDULED
Status: DISCONTINUED | OUTPATIENT
Start: 2024-07-08 | End: 2024-07-09

## 2024-07-08 RX ADMIN — ATORVASTATIN CALCIUM 80 MG: 40 TABLET, FILM COATED ORAL at 10:28

## 2024-07-08 RX ADMIN — EZETIMIBE 10 MG: 10 TABLET ORAL at 10:29

## 2024-07-08 RX ADMIN — HYDRALAZINE HYDROCHLORIDE 10 MG: 10 TABLET ORAL at 14:51

## 2024-07-08 RX ADMIN — CLONIDINE HYDROCHLORIDE 0.1 MG: 0.1 TABLET ORAL at 11:27

## 2024-07-08 RX ADMIN — LISINOPRIL 20 MG: 20 TABLET ORAL at 10:29

## 2024-07-08 RX ADMIN — ASPIRIN 81 MG: 81 TABLET, COATED ORAL at 10:29

## 2024-07-08 RX ADMIN — HYDRALAZINE HYDROCHLORIDE 10 MG: 10 TABLET ORAL at 21:07

## 2024-07-08 RX ADMIN — PERFLUTREN 2 ML: 6.52 INJECTION, SUSPENSION INTRAVENOUS at 10:28

## 2024-07-08 RX ADMIN — SPIRONOLACTONE 25 MG: 25 TABLET ORAL at 10:29

## 2024-07-08 RX ADMIN — AMLODIPINE BESYLATE 10 MG: 5 TABLET ORAL at 10:27

## 2024-07-08 RX ADMIN — POTASSIUM CHLORIDE 20 MEQ: 1500 TABLET, EXTENDED RELEASE ORAL at 10:29

## 2024-07-08 RX ADMIN — POTASSIUM CHLORIDE 40 MEQ: 1500 TABLET, EXTENDED RELEASE ORAL at 00:36

## 2024-07-08 ASSESSMENT — ACTIVITIES OF DAILY LIVING (ADL)
ADLS_ACUITY_SCORE: 20

## 2024-07-08 NOTE — PLAN OF CARE
Problem: Electrolyte Imbalance  Goal: Electrolyte Balance  Outcome: Progressing  Problem: Comorbidity Management  Goal: Blood Pressure in Desired Range  Outcome: Progressing  Intervention: Maintain Blood Pressure Management  Recent Flowsheet Documentation  Taken 7/8/2024 1000 by Her, Rosie GARZA RN  Medication Review/Management: medications reviewed     Goal Outcome Evaluation:    Pt independent in room and makes needs known. Pt RA and tele NSR. Pt denies pain and SOB. Pt up in chair for meals. Elevated pressures (170s/80s), PRN clonidine given. Last BM 7/6.   1500 tele is NSR w/ 1st degree AVB.   Hospitalist DELONTE sent and no intervention. Pt asymptomatic.

## 2024-07-08 NOTE — PROGRESS NOTES
CARDIOLOGY PROGRESS NOTE      Assessment/Plan:  1.  Benign essential hypertension-doubt endocrine disorder with normal aldosterone in  although repeat pending.  Does not appear to be renal artery stenosis based on renal artery ultrasound.  Most likely primary benign essential hypertension due to obesity.  Under fairly good management at home.  2.  Hypokalemia-consider renal/endocrine workup but at this point in time does not appear to be significant endocrinological abnormality.  3.  Morbid obesity-work on weight loss.  4.  Obstructive sleep apnea-nasal CPAP.  5.  Pure hypercholesterolemia-on atorvastatin with total cholesterol 160 and  which is very good.    Plan:  1.  Await echocardiogram.  2.  Continue current medications.    Discharge Plannin.  No real cardiac barrier to discharge.  2.  Can follow with Dr. Joe Vaca primary cardiologist.     LOS: 2 days     Subjective:  Interval History:    63-year-old white gentleman being seen on third day of hospitalization.  He feels well without any syncope, presyncope, dizziness, chest pains, palpitations, shortness of breath, PND, orthopnea or peripheral edema.    Medications  Current Facility-Administered Medications   Medication Dose Route Frequency Provider Last Rate Last Admin    amLODIPine (NORVASC) tablet 10 mg  10 mg Oral Daily Joe Vaca, DO        aspirin EC tablet 81 mg  81 mg Oral Daily Gondal, Saad J, MD   81 mg at 24 0744    atorvastatin (LIPITOR) tablet 80 mg  80 mg Oral Daily Gondal, Saad J, MD   80 mg at 24 0743    ezetimibe (ZETIA) tablet 10 mg  10 mg Oral Daily Gondal, Saad J, MD   10 mg at 24 0743    lisinopril (ZESTRIL) tablet 20 mg  20 mg Oral Daily Gondal, Saad J, MD   20 mg at 24 0742    potassium chloride tyshawn ER (KLOR-CON M20) CR tablet 20 mEq  20 mEq Oral Once Hamida Conte,         sodium chloride (PF) 0.9% PF flush 3 mL  3 mL Intracatheter Q8H Gondal, Saad J, MD   3  "mL at 07/07/24 1956    spironolactone (ALDACTONE) tablet 25 mg  25 mg Oral Daily Lio, Joe Bates DO   25 mg at 07/07/24 1425     Objective:   Vital signs in last 24 hours:  Temp:  [98.1  F (36.7  C)-98.6  F (37  C)] 98.3  F (36.8  C)  Pulse:  [64-85] 80  Resp:  [18] 18  BP: (133-183)/(64-95) 169/95  SpO2:  [91 %-97 %] 94 %    Physical Exam:  BP (!) 169/95 (BP Location: Left arm)   Pulse 80   Temp 98.3  F (36.8  C) (Oral)   Resp 18   Ht 1.829 m (6')   Wt (!) 178.6 kg (393 lb 11.2 oz)   SpO2 94%   BMI 53.40 kg/m      General Appearance:    Alert, cooperative, no distress, appears stated age   Head:    Normocephalic, without obvious abnormality, atraumatic   Throat:   Lips, mucosa, and tongue normal; teeth and gums normal   Neck:   Supple, symmetrical, trachea midline, no adenopathy;        thyroid:  No enlargement/tenderness/nodules; no carotid    bruit or JVD   Back:     Symmetric, no curvature, ROM normal, no CVA tenderness   Lungs:     Clear to auscultation bilaterally, respirations unlabored   Chest wall:    No tenderness or deformity   Heart:    Regular rate and rhythm, S1 and S2 normal, no murmur, rub   or gallop   Abdomen:     Soft, non-tender, bowel sounds active all four quadrants,     no masses, no organomegaly   Extremities:   Normal, atraumatic, no cyanosis or edema   Pulses:   2+ and symmetric all extremities   Skin:   Skin color, texture, turgor normal, no rashes or lesions     Cardiographics:      ECG: Personally reviewed by myself shows sinus rhythm, leftward axis, possible old anterior Q wave MI type pattern, prolonged QT interval.    Echocardiogram: Pending      Lab Results:   Recent Labs   Lab 07/07/24 0338   WBC 8.7   HGB 13.1*   HCT 38.9*        Recent Labs   Lab 07/07/24 0338      CO2 32*   BUN 20.9   .       No results found for: \"CKTOTAL\", \"CKMB\", \"TROPONINI\"        "

## 2024-07-08 NOTE — PLAN OF CARE
Problem: Adult Inpatient Plan of Care  Goal: Optimal Comfort and Wellbeing  Outcome: Progressing     Problem: Electrolyte Imbalance  Goal: Electrolyte Balance  Outcome: Progressing   Goal Outcome Evaluation:      Plan of Care Reviewed With: patient    Overall Patient Progress: improvingOverall Patient Progress: improving       A & O. BP elevated, parameters for hydralazine not met. On RA, uses CPAP at night. K+ protocol, recheck tomorrow. Renal ultrasound done last night, awaiting result.

## 2024-07-08 NOTE — PROGRESS NOTES
[facility]    PROGRESS NOTE - Hospitalist Service    ASSESSMENT AND PLAN     Active Problems:    Hypokalemia    Dizziness    Pre-syncope    Stephen Vyas is a 63 year old male with history of HTN, dyslipidemia and recurrent hypokalemia.  He presented to the urgency care in last several days for evaluation of lightheadedness, flushing, palpitations.  Labs showed hypokalemia and MELO; Stephen was treated with potassium and fluids and was then transferred to [facility] for further evaluation and management. Workup for aldosterone issue,      Severe hypokalemia- Recurrent   Possible hyperaldosteronism-Labs pending  MELO- resolved   - continue K replacement  Creatinine baseline appears to be 1.1     Hx of hypertension, difficult to control.  Possibly secondary to poor KAY management.  Orthostatic Sx  Rule out pheochromocytoma-catecholamines pending.  ECHO reviewed with EF of 55 to 60% and mild LVH.  Appreciate cardio input   Continue amlodipine, lisinopril, spironolactone.  Add hydralazine.  Can consider switching to Procardia from amlodipine  -Renal artery ultrasound-no renal artery stenosis noted  - TSH normal  - PT and OT evaluation and fall precautions  -Recommend outpatient sleep study to evaluate CPAP settings     History of dyslipidemia  Obesity  - continue with aspirin, and atorvastatin and Zetia (begun ~ 2 weeks ago)     Regular/daily tobacco (chewing) use  - abstain in future, if able     ECHO 7/8/24  The left ventricle is normal in size.  Left ventricular function is normal.The ejection fraction is 55-60%.  There is mild concentric left ventricular hypertrophy.  Normal right ventricle size and systolic function.  The left atrium is moderately dilated.  Mildly dilated ascending aorta.    Barriers to discharge: Blood pressure control, cardio recs     Anticipated length of stay: 1-2 days     Medically Ready for Discharge: Anticipated Tomorrow    Clinically Significant Risk Factors         # Hypokalemia: Lowest K = 2.9 mmol/L in last 2 days, will replace as needed   # Hypocalcemia: Lowest Ca = 8.4 mg/dL in last 2 days, will monitor and replace as appropriate         # Hypertension: Noted on problem list              # Severe Obesity: Estimated body mass index is 52.88 kg/m  as calculated from the following:    Height as of this encounter: 1.829 m (6').    Weight as of this encounter: 176.9 kg (389 lb 14.4 oz)., PRESENT ON ADMISSION            Subjective:  Patient resting in bed.  Wearing nasal CPAP.  No complaints of chest pain or shortness of breath.  No other complaints.    PHYSICAL EXAM  Temp:  [98.2  F (36.8  C)-98.6  F (37  C)] 98.3  F (36.8  C)  Pulse:  [54-91] 86  Resp:  [18] 18  BP: (156-189)/(70-95) 171/79  SpO2:  [93 %-97 %] 96 %  Wt Readings from Last 1 Encounters:   07/08/24 (!) 176.9 kg (389 lb 14.4 oz)       Intake/Output Summary (Last 24 hours) at 7/8/2024 1412  Last data filed at 7/8/2024 0800  Gross per 24 hour   Intake 500 ml   Output 2850 ml   Net -2350 ml      Body mass index is 52.88 kg/m .    GENRL: Alert and answering questions appropriately. Not in acute distress. Lying in bed   CHEST: Clear to auscultation bilaterally. No wheezes. Breathing easily   HEART: Regular rate and rhythm, S1S2 auscultated. No murmurs, rubs or gallops.   ABDMN: Soft. Non-tender, obese  EXTRM: trace pedal edema  NEURO: Following commands moving extremities.    PSYCH: flat affect and mood.   INTGM: No skin rash    Medical Decision Making       35 MINUTES SPENT BY ME on the date of service doing chart review, history, exam, documentation & further activities per the note.      PERTINENT LABS/IMAGING:  Results for orders placed or performed during the hospital encounter of 07/06/24   US Renal Complete w Arterial Duplex    Impression    IMPRESSION:   1.  Limited imaging of the renal arteries due to bowel gas/habitus. No evidence of significant renal artery stenosis.   Echocardiogram Complete   Result  "Value Ref Range    LVEF  55-60%      Most Recent 3 CBC's:  Recent Labs   Lab Test 07/07/24 0338 07/06/24 1934 06/14/24  1228   WBC 8.7 10.2 10.3   HGB 13.1* 14.1 14.4   MCV 85 84 86    326 314     Most Recent 3 BMP's:  Recent Labs   Lab Test 07/08/24 0518 07/08/24  0002 07/07/24 1746 07/07/24 1536 07/07/24  1233 07/07/24  0802 07/07/24  0338 07/06/24 1934 06/14/24  1228   NA  --   --   --   --   --   --  141 142 139   POTASSIUM 3.6 3.3* 3.2*  --    < >  --  2.9*  2.9* 3.0* 4.0   CHLORIDE  --   --   --   --   --   --  101 101 99   CO2  --   --   --   --   --   --  32* 30* 28   BUN  --   --   --   --   --   --  20.9 19.7 18.3   CR  --   --   --   --   --   --  1.19* 1.20* 1.18*   ANIONGAP  --   --   --   --   --   --  8 11 12   MISSAEL  --   --   --   --   --   --  8.6* 8.4* 9.0   GLC  --   --   --  89  --  104* 116* 99 103*    < > = values in this interval not displayed.     Most Recent 2 LFT's:  Recent Labs   Lab Test 07/07/24 0338 06/14/24  1228   AST 33 25   ALT 38 29   ALKPHOS 80 91   BILITOTAL 0.4 0.7       Recent Labs   Lab Test 06/14/24  1228   CHOL 160   HDL 25*   *   TRIG 142     Recent Labs   Lab Test 06/14/24  1228 09/28/22  1623   * 133*     Recent Labs   Lab Test 07/08/24 0518 07/07/24 1746 07/07/24  1536 07/07/24  0802 07/07/24  0338   NA  --   --   --   --  141   POTASSIUM 3.6   < >  --    < > 2.9*  2.9*   CHLORIDE  --   --   --   --  101   CO2  --   --   --   --  32*   GLC  --   --  89   < > 116*   BUN  --   --   --   --  20.9   CR  --   --   --   --  1.19*   GFRESTIMATED  --   --   --   --  69   MISSAEL  --   --   --   --  8.6*    < > = values in this interval not displayed.     Recent Labs   Lab Test 06/14/24  1228 09/28/22  1623   A1C 5.6 5.6     Recent Labs   Lab Test 07/07/24  0338 07/06/24  1934 06/14/24  1228   HGB 13.1* 14.1 14.4     No results for input(s): \"TROPONINI\" in the last 94499 hours.  No results for input(s): \"BNP\", \"NTBNPI\", \"NTBNP\" in the last 23599 " "hours.  Recent Labs   Lab Test 07/07/24  1250   TSH 1.03     No results for input(s): \"INR\" in the last 22885 hours.    Hamida Conte DO  Hospitalist Service  [facility]      "

## 2024-07-09 VITALS
SYSTOLIC BLOOD PRESSURE: 178 MMHG | DIASTOLIC BLOOD PRESSURE: 81 MMHG | HEIGHT: 72 IN | WEIGHT: 315 LBS | BODY MASS INDEX: 42.66 KG/M2 | HEART RATE: 61 BPM | TEMPERATURE: 97.7 F | RESPIRATION RATE: 18 BRPM | OXYGEN SATURATION: 96 %

## 2024-07-09 LAB
ALDOST SERPL-MCNC: 15.1 NG/DL (ref 0–31)
ANION GAP SERPL CALCULATED.3IONS-SCNC: 10 MMOL/L (ref 7–15)
BUN SERPL-MCNC: 20.6 MG/DL (ref 8–23)
CALCIUM SERPL-MCNC: 8.6 MG/DL (ref 8.8–10.2)
CHLORIDE SERPL-SCNC: 101 MMOL/L (ref 98–107)
CREAT SERPL-MCNC: 1.2 MG/DL (ref 0.67–1.17)
DEPRECATED HCO3 PLAS-SCNC: 30 MMOL/L (ref 22–29)
EGFRCR SERPLBLD CKD-EPI 2021: 69 ML/MIN/1.73M2
GLUCOSE SERPL-MCNC: 107 MG/DL (ref 70–99)
POTASSIUM SERPL-SCNC: 3.3 MMOL/L (ref 3.4–5.3)
SODIUM SERPL-SCNC: 141 MMOL/L (ref 135–145)

## 2024-07-09 PROCEDURE — 999N000157 HC STATISTIC RCP TIME EA 10 MIN

## 2024-07-09 PROCEDURE — 250N000013 HC RX MED GY IP 250 OP 250 PS 637: Performed by: INTERNAL MEDICINE

## 2024-07-09 PROCEDURE — 99232 SBSQ HOSP IP/OBS MODERATE 35: CPT | Performed by: INTERNAL MEDICINE

## 2024-07-09 PROCEDURE — 80048 BASIC METABOLIC PNL TOTAL CA: CPT | Performed by: INTERNAL MEDICINE

## 2024-07-09 PROCEDURE — 250N000013 HC RX MED GY IP 250 OP 250 PS 637: Performed by: STUDENT IN AN ORGANIZED HEALTH CARE EDUCATION/TRAINING PROGRAM

## 2024-07-09 PROCEDURE — 99239 HOSP IP/OBS DSCHRG MGMT >30: CPT | Performed by: INTERNAL MEDICINE

## 2024-07-09 PROCEDURE — 36415 COLL VENOUS BLD VENIPUNCTURE: CPT | Performed by: INTERNAL MEDICINE

## 2024-07-09 RX ORDER — LISINOPRIL 20 MG/1
20 TABLET ORAL DAILY
Qty: 30 TABLET | Refills: 0 | Status: SHIPPED | OUTPATIENT
Start: 2024-07-10 | End: 2024-07-25

## 2024-07-09 RX ORDER — HYDRALAZINE HYDROCHLORIDE 25 MG/1
25 TABLET, FILM COATED ORAL EVERY 8 HOURS
Qty: 90 TABLET | Refills: 0 | Status: SHIPPED | OUTPATIENT
Start: 2024-07-09 | End: 2024-07-25

## 2024-07-09 RX ORDER — POTASSIUM CHLORIDE 750 MG/1
20 TABLET, EXTENDED RELEASE ORAL DAILY
Qty: 60 TABLET | Refills: 0 | Status: SHIPPED | OUTPATIENT
Start: 2024-07-09 | End: 2024-07-25

## 2024-07-09 RX ORDER — SPIRONOLACTONE 25 MG/1
25 TABLET ORAL
Qty: 60 TABLET | Refills: 0 | Status: SHIPPED | OUTPATIENT
Start: 2024-07-09 | End: 2024-07-25

## 2024-07-09 RX ORDER — POTASSIUM CHLORIDE 1500 MG/1
40 TABLET, EXTENDED RELEASE ORAL ONCE
Status: COMPLETED | OUTPATIENT
Start: 2024-07-09 | End: 2024-07-09

## 2024-07-09 RX ORDER — SPIRONOLACTONE 25 MG/1
25 TABLET ORAL
Status: DISCONTINUED | OUTPATIENT
Start: 2024-07-09 | End: 2024-07-09 | Stop reason: HOSPADM

## 2024-07-09 RX ORDER — HYDRALAZINE HYDROCHLORIDE 25 MG/1
25 TABLET, FILM COATED ORAL EVERY 8 HOURS SCHEDULED
Status: DISCONTINUED | OUTPATIENT
Start: 2024-07-09 | End: 2024-07-09 | Stop reason: HOSPADM

## 2024-07-09 RX ORDER — AMLODIPINE BESYLATE 10 MG/1
10 TABLET ORAL DAILY
Qty: 30 TABLET | Refills: 0 | Status: SHIPPED | OUTPATIENT
Start: 2024-07-10 | End: 2024-07-25

## 2024-07-09 RX ADMIN — LISINOPRIL 20 MG: 20 TABLET ORAL at 08:13

## 2024-07-09 RX ADMIN — HYDRALAZINE HYDROCHLORIDE 10 MG: 10 TABLET ORAL at 05:40

## 2024-07-09 RX ADMIN — POTASSIUM CHLORIDE 40 MEQ: 1500 TABLET, EXTENDED RELEASE ORAL at 08:13

## 2024-07-09 RX ADMIN — EZETIMIBE 10 MG: 10 TABLET ORAL at 08:13

## 2024-07-09 RX ADMIN — ATORVASTATIN CALCIUM 80 MG: 40 TABLET, FILM COATED ORAL at 08:13

## 2024-07-09 RX ADMIN — AMLODIPINE BESYLATE 10 MG: 5 TABLET ORAL at 08:13

## 2024-07-09 RX ADMIN — ASPIRIN 81 MG: 81 TABLET, COATED ORAL at 08:13

## 2024-07-09 RX ADMIN — SPIRONOLACTONE 25 MG: 25 TABLET ORAL at 08:13

## 2024-07-09 ASSESSMENT — ACTIVITIES OF DAILY LIVING (ADL)
ADLS_ACUITY_SCORE: 20

## 2024-07-09 NOTE — PROGRESS NOTES
CARDIOLOGY PROGRESS NOTE      Assessment/Plan:  1.  Benign essential hypertension-doubt endocrine disorder with normal aldosterone in  as well as normal cortisol and unremarkable renal artery ultrasound.  Most likely primary benign essential hypertension due to obesity.  Under fairly good management at home.  Would defer to primary or endocrinology but question selective renal vein Dereck study as an outpatient.  2.  Hypokalemia-consider renal/endocrine workup but at this point in time does not appear to be significant endocrinological abnormality based on normal aldosterone, cortisol, renal artery ultrasound, and TSH.  3.  Morbid obesity-work on weight loss.  4.  Obstructive sleep apnea-nasal CPAP.  5.  Pure hypercholesterolemia-on atorvastatin with total cholesterol 160 and  which is very good.    Plan:  1.  Increase aldosterone to twice daily.  2.  Cardiology has nothing further inpatient to add, we will most likely sign off tomorrow.    Discharge Plannin.  No real cardiac barrier to discharge.  Consider discharge home today.  2.  Can follow with Dr. Joe Vaca primary cardiologist.  3.  Defer to primary care team but consider outpatient selective renal vein renin study.     LOS: 3 days     Subjective:  Interval History:    63-year-old white gentleman being seen on 4th day of hospitalization.  He feels well without any syncope, presyncope, dizziness, chest pains, palpitations, shortness of breath, PND, orthopnea or peripheral edema.  Desires discharge.    Medications  Current Facility-Administered Medications   Medication Dose Route Frequency Provider Last Rate Last Admin    amLODIPine (NORVASC) tablet 10 mg  10 mg Oral Daily Joe Vaca, DO   10 mg at 24 0813    aspirin EC tablet 81 mg  81 mg Oral Daily Gondal, Saad J, MD   81 mg at 24 08    atorvastatin (LIPITOR) tablet 80 mg  80 mg Oral Daily Gondal, Saad J, MD   80 mg at 24 08    ezetimibe (ZETIA)  tablet 10 mg  10 mg Oral Daily Gondal, Saad J, MD   10 mg at 07/09/24 0813    hydrALAZINE (APRESOLINE) tablet 25 mg  25 mg Oral Q8H Hamida Mesa DO        lisinopril (ZESTRIL) tablet 20 mg  20 mg Oral Daily Gondal, Saad J, MD   20 mg at 07/09/24 0813    sodium chloride (PF) 0.9% PF flush 3 mL  3 mL Intracatheter Q8H Gondal, Saad J, MD   3 mL at 07/08/24 2107    spironolactone (ALDACTONE) tablet 25 mg  25 mg Oral Daily Joe Vaca DO   25 mg at 07/09/24 0813     Objective:   Vital signs in last 24 hours:  Temp:  [97.2  F (36.2  C)-98.8  F (37.1  C)] 97.7  F (36.5  C)  Pulse:  [] 61  Resp:  [16-18] 18  BP: (142-189)/(61-93) 176/80  SpO2:  [93 %-97 %] 96 %    Physical Exam:  BP (!) 176/80 (BP Location: Left arm)   Pulse 61   Temp 97.7  F (36.5  C) (Oral)   Resp 18   Ht 1.829 m (6')   Wt (!) 177.1 kg (390 lb 8 oz)   SpO2 96%   BMI 52.96 kg/m      General Appearance:    Alert, cooperative, no distress, appears stated age   Head:    Normocephalic, without obvious abnormality, atraumatic   Throat:   Lips, mucosa, and tongue normal; teeth and gums normal   Neck:   Supple, symmetrical, trachea midline, no adenopathy;        thyroid:  No enlargement/tenderness/nodules; no carotid    bruit or JVD   Back:     Symmetric, no curvature, ROM normal, no CVA tenderness   Lungs:     Clear to auscultation bilaterally, respirations unlabored   Chest wall:    No tenderness or deformity   Heart:    Regular rate and rhythm, S1 and S2 normal, no murmur, rub   or gallop   Abdomen:     Soft, non-tender, bowel sounds active all four quadrants,     no masses, no organomegaly   Extremities:   Normal, atraumatic, no cyanosis or edema   Pulses:   2+ and symmetric all extremities   Skin:   Skin color, texture, turgor normal, no rashes or lesions     Cardiographics:      ECG: Personally reviewed by myself shows sinus rhythm, leftward axis, possible old anterior Q wave MI type pattern, prolonged QT  "interval.    Echocardiogram:   The left ventricle is normal in size.  Left ventricular function is normal.The ejection fraction is 55-60%.  There is mild concentric left ventricular hypertrophy.  Normal right ventricle size and systolic function.  The left atrium is moderately dilated.  Mildly dilated ascending aorta.    Lab Results:   Recent Labs   Lab 07/07/24  0338   WBC 8.7   HGB 13.1*   HCT 38.9*        Recent Labs   Lab 07/09/24  0456      CO2 30*   BUN 20.6   .       No results found for: \"CKTOTAL\", \"CKMB\", \"TROPONINI\"        "

## 2024-07-09 NOTE — PLAN OF CARE
Problem: Electrolyte Imbalance  Goal: Electrolyte Balance  Outcome: Progressing     Problem: Comorbidity Management  Goal: Blood Pressure in Desired Range  Outcome: Progressing  Intervention: Maintain Blood Pressure Management  Recent Flowsheet Documentation  Taken 7/9/2024 0410 by Sherrill Wei RN  Medication Review/Management: medications reviewed  Taken 7/9/2024 0010 by Sherrill Wei RN  Medication Review/Management: medications reviewed  Taken 7/8/2024 2010 by Sherrill Wei RN  Medication Review/Management: medications reviewed     Goal Outcome Evaluation:  Pt denies pain. K 3.3 this AM-protocol ran with replacement scheduled and recheck 4hr later. NSR with HR in 70s. Up independently to bathroom and chair this evening. Good urine output. CPAP at NOC with O2 sats in the mid 90s. A/O. VSS. Will continue to monitor and notify MD of any changes.

## 2024-07-09 NOTE — DISCHARGE SUMMARY
[facility]  Hospitalist Discharge Summary      Date of Admission:  7/6/2024  Date of Discharge:  7/9/2024 12:30 PM  Discharging Provider: Hamida Conte DO  Discharge Service: Hospitalist Service    Discharge Diagnoses   Accelerated hypertension  LVH  Severe hypokalemia  MELO  Obesity  KAY    Clinically Significant Risk Factors     # Severe Obesity: Estimated body mass index is 52.96 kg/m  as calculated from the following:    Height as of this encounter: 1.829 m (6').    Weight as of this encounter: 177.1 kg (390 lb 8 oz).       Follow-ups Needed After Discharge   Follow-up Appointments     Follow-up and recommended labs and tests       Follow up with primary care provider, Myah Rapp, within 3-4 days .    Follow up BMP to check potassium level- initiated on potassium   replacement this admission.   Follow up regarding HTN management  Follow up with cardiology  Follow up with weight management team as planned   Follow up for sleep study            Unresulted Labs Ordered in the Past 30 Days of this Admission       Date and Time Order Name Status Description    7/7/2024 12:32 PM Aldosterone Renin Ratio In process     7/7/2024 12:32 PM Renin activity In process     7/7/2024 12:15 PM Catecholamines Fractionated In process         These results will be followed up by primary care provider and cardiology    Discharge Disposition   Discharged to home  Condition at discharge: Stable    Hospital Course   Patient is a 63-year-old male presenting with lightheadedness and found to have severe hypokalemia and accelerated hypertension.  Workup for aldosteronism initiated and labs are pending at time of discharge.  Patient was told to follow-up with primary care provider for these results.  Patient was initiated on several antihypertensive medications.  ECHO was completed and revealed EF of 55 to 60% with LVH.  Discussed with patient the importance of weight loss as well as repeat sleep  study.  Patient was discharged with potassium replacement.  He will follow-up with his primary care provider for repeat labs in 3 days.  Patient was found stable and discharged home.    Consultations This Hospital Stay   PHYSICAL THERAPY ADULT IP CONSULT  OCCUPATIONAL THERAPY ADULT IP CONSULT  CARDIOLOGY IP CONSULT    Code Status   Full Code    Time Spent on this Encounter   IHamida DO, personally saw the patient today and spent greater than 30 minutes discharging this patient.       Hamida Conte DO  [facility]  [address] [place] [id number]  Phone: [phone]  Fax: [phone]  ______________________________________________________________________    Physical Exam   Vital Signs: Temp: 97.7  F (36.5  C) Temp src: Oral BP: (!) 178/81 Pulse: 61   Resp: 18 SpO2: 96 % O2 Device: None (Room air)    Weight: 390 lbs 8 oz         Primary Care Physician   Myah Rapp    Discharge Orders      Basic metabolic panel  (Ca, Cl, CO2, Creat, Gluc, K, Na, BUN)     Adult Sleep Eval & Management  Referral      Adult Cardiology Eval  Referral      Reason for your hospital stay    Severe hypokalemia, Left ventricular hypertrophy, Accelerated Hypertension     Follow-up and recommended labs and tests     Follow up with primary care provider, Myah Rapp, within 3-4 days .  Follow up BMP to check potassium level- initiated on potassium replacement this admission.   Follow up regarding HTN management  Follow up with cardiology  Follow up with weight management team as planned   Follow up for sleep study     Activity    Your activity upon discharge: activity as tolerated     Diet    Follow this diet upon discharge: Orders Placed This Encounter      Combination Diet Regular Diet Adult       Significant Results and Procedures   Most Recent 3 CBC's:  Recent Labs   Lab Test 07/07/24  0338 07/06/24  1934 06/14/24  1228   WBC 8.7 10.2 10.3    HGB 13.1* 14.1 14.4   MCV 85 84 86    326 314     Most Recent 3 BMP's:  Recent Labs   Lab Test 07/09/24  0456 07/08/24  0518 07/08/24  0002 07/07/24  1746 07/07/24  1536 07/07/24  1233 07/07/24  0802 07/07/24  0338 07/06/24  1934     --   --   --   --   --   --  141 142   POTASSIUM 3.3* 3.6 3.3*   < >  --    < >  --  2.9*  2.9* 3.0*   CHLORIDE 101  --   --   --   --   --   --  101 101   CO2 30*  --   --   --   --   --   --  32* 30*   BUN 20.6  --   --   --   --   --   --  20.9 19.7   CR 1.20*  --   --   --   --   --   --  1.19* 1.20*   ANIONGAP 10  --   --   --   --   --   --  8 11   MISSAEL 8.6*  --   --   --   --   --   --  8.6* 8.4*   *  --   --   --  89  --  104* 116* 99    < > = values in this interval not displayed.     Most Recent 2 LFT's:  Recent Labs   Lab Test 07/07/24 0338 06/14/24  1228   AST 33 25   ALT 38 29   ALKPHOS 80 91   BILITOTAL 0.4 0.7   ,   Results for orders placed or performed during the hospital encounter of 07/06/24   US Renal Complete w Arterial Duplex    Narrative    EXAM:  1. RENAL ULTRASOUND   2. RENAL DUPLEX  LOCATION: [facility]  DATE: 7/7/2024    INDICATION: uncontrolled HTN; eval. for renal artery stenosis.    COMPARISON: None.  TECHNIQUE: Duplex imaging is performed utilizing gray-scale, two-dimensional images, and color-flow imaging. Doppler waveform analysis and spectral Doppler imaging is also performed.    FINDINGS:     RIGHT KIDNEY: 10 x 6.9 x 7.4 cm. Normal without hydronephrosis or masses.    LEFT KIDNEY 11.9 x 5.7 x 6.5 cm. Normal without hydronephrosis or masses..     BLADDER: Normal.    RENAL DUPLEX: Limited views of the renal arteries due to bowel gas/habitus.  Aortic PSV: 129 cm/s, multiphasic  Right Renal Artery PSV: 96 cm/s (Normal considered less than 200 cm/s.)  Right Intrarenal Resistive Index: Borderline, 0.7 to 0.8  Left Renal Artery PSV 94 cm/s (Normal considered less than 200 cm/s.)  Left Intrarenal Resistive Index:  Borderline, 0.7 to 0.8      Impression    IMPRESSION:   1.  Limited imaging of the renal arteries due to bowel gas/habitus. No evidence of significant renal artery stenosis.   Echocardiogram Complete     Value    LVEF  55-60%    Narrative    [id number]  [id number]  [id number]  [id number]^HEMA^STEPHANIE^[place] [address]     Name: QAMAR KIMBLE  MRN: [id number]  : 1960  Study Date: 2024 09:46 AM  Age: 63 yrs  Gender: Male  Patient Location: [facility]  Reason For Study: LVH  Ordering Physician: STEPHANIE GARRIDO  Performed By: KB     BSA: 2.8 m2  Height: 72 in  Weight: 393 lb  HR: 65  BP: 169/95 mmHg  ______________________________________________________________________________  Procedure  Complete Echo Adult. Definity (NDC #50004-553) given intravenously.  ______________________________________________________________________________  Interpretation Summary     The left ventricle is normal in size.  Left ventricular function is normal.The ejection fraction is 55-60%.  There is mild concentric left ventricular hypertrophy.  Normal right ventricle size and systolic function.  The left atrium is moderately dilated.  Mildly dilated ascending aorta.  ______________________________________________________________________________  Left Ventricle  The left ventricle is normal in size. Left ventricular function is normal.The  ejection fraction is 55-60%. There is mild concentric left ventricular  hypertrophy. Left ventricular diastolic function is normal. No regional wall  motion abnormalities noted.     Right Ventricle  Normal right ventricle size and systolic function.     Atria  The left atrium is moderately dilated. Right atrium not well visualized.     Mitral Valve  Mitral valve leaflets appear normal. There is trace mitral regurgitation.     Tricuspid Valve  The tricuspid valve is not well visualized. This degree of valvular  regurgitation is within normal  limits.     Aortic Valve  The aortic valve is not well visualized. No aortic regurgitation is present.  No hemodynamically significant valvular aortic stenosis.     Pulmonic Valve  The pulmonic valve is not well visualized. This degree of valvular  regurgitation is within normal limits.     Vessels  Mildly dilated ascending aorta. IVC diameter <2.1 cm collapsing >50% with  sniff suggests a normal RA pressure of 3 mmHg.     Pericardium  There is no pericardial effusion.     ______________________________________________________________________________  MMode/2D Measurements & Calculations  IVSd: 1.3 cm  LVIDd: 5.1 cm  LVIDs: 3.6 cm  LVPWd: 1.2 cm  FS: 29.2 %     LV mass(C)d: 261.7 grams  LV mass(C)dI: 92.2 grams/m2  Ao root diam: 3.0 cm  asc Aorta Diam: 4.1 cm  LVOT diam: 2.1 cm  LVOT area: 3.6 cm2  Ao root diam index Ht(cm/m): 1.6  Ao root diam index BSA (cm/m2): 1.1  Asc Ao diam index BSA (cm/m2): 1.4  Asc Ao diam index Ht(cm/m): 2.2  EF Biplane: 57.5 %  LA Volume Indexed (AL/bp): 43.1 ml/m2     RV Base: 3.4 cm  RWT: 0.46  TAPSE: 2.3 cm     Time Measurements  MM HR: 63.0 BPM     Doppler Measurements & Calculations  MV E max truong: 73.3 cm/sec  MV A max truong: 78.0 cm/sec  MV E/A: 0.94  MV max PG: 3.5 mmHg  MV mean P.5 mmHg  MV V2 VTI: 25.9 cm  MVA(VTI): 3.4 cm2  MV dec slope: 325.1 cm/sec2  MV dec time: 0.23 sec  Ao V2 max: 114.0 cm/sec  Ao max P.0 mmHg  Ao V2 mean: 81.0 cm/sec  Ao mean PG: 3.0 mmHg  Ao V2 VTI: 28.5 cm  MANDO(I,D): 3.1 cm2  MANDO(V,D): 3.2 cm2  LV V1 max P.2 mmHg  LV V1 max: 102.0 cm/sec  LV V1 VTI: 24.4 cm  SV(LVOT): 87.8 ml  SI(LVOT): 30.9 ml/m2  PA V2 max: 107.8 cm/sec  PA max P.7 mmHg  PA acc time: 0.13 sec  AV Truong Ratio (DI): 0.89     MANDO Index (cm2/m2): 1.1  E/E': 12.6  E/E' avg: 10.1  Lateral E/e': 7.7  Medial E/e': 12.5  Peak E' Truong: 5.8 cm/sec  RV S Truong: 10.4 cm/sec     ______________________________________________________________________________  Report approved by: Aylin AGUSTIN  Remberto Lynch 07/08/2024 02:00 PM             Discharge Medications   Discharge Medication List as of 7/9/2024 10:42 AM        START taking these medications    Details   hydrALAZINE (APRESOLINE) 25 MG tablet Take 1 tablet (25 mg) by mouth every 8 hours for 30 days, Disp-90 tablet, R-0, E-Prescribe      lisinopril (ZESTRIL) 20 MG tablet Take 1 tablet (20 mg) by mouth daily for 30 days, Disp-30 tablet, R-0, E-Prescribe      potassium chloride tyshawn ER (KLOR-CON M10) 10 MEQ CR tablet Take 2 tablets (20 mEq) by mouth daily for 30 days, Disp-60 tablet, R-0, E-Prescribe           CONTINUE these medications which have CHANGED    Details   amLODIPine (NORVASC) 10 MG tablet Take 1 tablet (10 mg) by mouth daily for 30 days, Disp-30 tablet, R-0, E-Prescribe      spironolactone (ALDACTONE) 25 MG tablet Take 1 tablet (25 mg) by mouth 2 times daily for 30 days, Disp-60 tablet, R-0, E-Prescribe           CONTINUE these medications which have NOT CHANGED    Details   aspirin 81 MG EC tablet Take 81 mg by mouth daily, Historical      atorvastatin (LIPITOR) 80 MG tablet Take 1 tablet (80 mg) by mouth daily, Disp-90 tablet, R-3, E-Prescribe      ezetimibe (ZETIA) 10 MG tablet Take 1 tablet (10 mg) by mouth daily, Disp-90 tablet, R-3, E-Prescribe           STOP taking these medications       lisinopril-hydrochlorothiazide (ZESTORETIC) 20-25 MG tablet Comments:   Reason for Stopping:             Allergies   Allergies   Allergen Reactions    Codeine      Other reaction(s): GI intolerance  Verified

## 2024-07-09 NOTE — PLAN OF CARE
Goal Outcome Evaluation:       Discharge instructions reviewed with pt.including Diet, Activity,Medications and F/U with MD, Labs as outpt. Waiting for wife to pick him up to take him home.

## 2024-07-09 NOTE — PLAN OF CARE
Goal Outcome Evaluation:       BP was 176/80 this am. Denies any CP,SOB or dizziness when up. Given am BP meds. K+ was 3.3. Given Kdur PO x1. Plan home on potassium. Seen by Cardiology this am. Will F/U with MD as outpt for BP.

## 2024-07-10 LAB
ALDOST/RENIN PLAS-RTO: 16.8 {RATIO} (ref 0–25)
RENIN PLAS-CCNC: 0.9 NG/ML/HR

## 2024-07-11 ENCOUNTER — PATIENT OUTREACH (OUTPATIENT)
Dept: CARE COORDINATION | Facility: CLINIC | Age: 64
End: 2024-07-11
Payer: COMMERCIAL

## 2024-07-11 LAB
CATECHOLS PLAS-IMP: ABNORMAL
DOPAMINE SERPL-MCNC: 194 PMOL/L
EPINEPH PLAS-MCNC: <55 PMOL/L
NOREPINEPH PLAS-MCNC: 6415 PMOL/L

## 2024-07-11 NOTE — PROGRESS NOTES
Connected Care Resource Center:   [facility] Contact  [facility]/Voicemail     Clinical Data: Post-Discharge Outreach     Outreach attempted x 2.  Left message on patient's voicemail, providing [facility]'s central phone number of [phone] ([phone]) for questions/concerns and/or to schedule an appt with an [facility] provider, if they do not have a PCP.      Plan:  [facility] will do no further outreaches at this time.       Princess Caba MA  Connected Care Resource Center, [facility]    *Connected Care Resource Team does NOT follow patient ongoing. Referrals are identified based on internal discharge reports and the outreach is to ensure patient has an understanding of their discharge instructions.

## 2024-07-14 NOTE — RESULT ENCOUNTER NOTE
Can we call patient and see if PCP is Myah Rapp or Annita Bhardwaj at this time. If Myah Rapp, please get him in with me this week, okay to override or use lunch hour. If Annita Bhardwaj, please update me so I can let this provider know to get him in.

## 2024-07-14 NOTE — PROGRESS NOTES
"    New Medical Weight Management Consult    PATIENT:  Stephen Vyas  MRN:         [id number]  :         1960  DAVID:         7/15/2024    Dear Dr. Christensen,    I had the pleasure of seeing your patient, Stephen Vyas. Full intake/assessment was done to determine barriers to weight loss success and develop a treatment plan. Stehpen Vyas is a 63 year old male interested in treatment of medical problems associated with excess weight. He has a height of 6' 0\", a weight of 390 lbs 0 oz, and the calculated Body mass index is 52.89 kg/m .    ASSESSMENT/PLAN:  1. Class 3 severe obesity due to excess calories with body mass index (BMI) of 50.0 to 59.9 in adult, unspecified whether serious comorbidity present (H)  We discussed healthy habits to assist with weight loss. He will work on planning meals ahead of time using the plate method for portion control and macronutrient proportions. His wife will sit in on his dietician visit as she does most of the meal planning and preparation. He will try keeping a food journal.  His exercise will be limited but will be starting PT fairly soon. We discussed medication that may assist with weight loss. He would be a candidate for wegovy or topamax.  Risks/ benefits and possible side effects were discussed and questions were answered. Written information was given.  He is not interested in starting medication at this time.    2. Mixed hyperlipidemia  This may improve with healthy habits and weight loss.     3. Primary hypertension  This may improve with healthy habits and weight loss.     4. Obstructive sleep apnea syndrome  This may improve with healthy habits and weight loss.      5. History of migraine headaches  This may improve with healthy habits and weight loss.     He has the following co-morbidities:        2024    12:55 PM   --   I have the following health issues associated with obesity High Blood Pressure    High Cholesterol    Sleep Apnea   I have the following " "symptoms associated with obesity Knee Pain    Lower Extremity Swelling    Back Pain    Fatigue             7/12/2024    12:55 PM   Referring Provider   Please name the provider who referred you to Medical Weight Management  If you do not know, please answer \"I Don't Know\" Annita Bhardwaj           7/12/2024    12:55 PM   Weight History   How concerned are you about your weight? Very Concerned   I became overweight As an Adult   The following factors have contributed to my weight gain Change in Schedule    A Health Crisis    Eating Wrong Types of Food    Eating Too Much    Lack of Exercise    Genetic (Runs in the Family)    Stress   I have tried the following methods to lose weight Exercise    Meal Replacements    Fasting   My lowest weight since age 20 was 215   My highest weight since age 20 was 395   The most weight I have ever lost was (lbs) 80   I have the following family history of obesity/being overweight My mother is overweight    My father is overweight    One or more of my siblings are overweight    Many of my relatives are overweight   How has your weight changed over the last year? Gained   How many pounds? 10           7/12/2024    12:55 PM   Diet Recall Review with Patient   If you do eat lunch, what types of food do you typically eat? B: skips, drinks coffee all day long  L: burger, sandwich- often picks up, if he packs lunch it is sandwich and fruit and snack sized chips and yogurt or hot lunch at [facility] [employer]- pasta, hot dogs, sometimes eats the vegetables   If you do eat supper, what types of food do you typically eat? burger, potato, vegetable, chicken, eggs, pizza: more starch and protein, sometimes vegetables   If you do snack, what types of food do you typically eat? chips, donuts, candy bar- they are provided at work- generally eats late afternoon   How many glasses of juice do you drink in a typical day? 0   How many of glasses of milk do you drink in a typical day? 0   How many 8oz glasses of " sugar containing drinks such as Rk-Aid/sweet tea do you drink in a day? 0   How many cans/bottles of sugar pop/soda/tea/sports drinks do you drink in a day? 0   How many cans/bottles of diet pop/soda/tea or sports drink do you drink in a day? 2   How often do you have a drink of alcohol? Never           7/12/2024    12:55 PM   Eating Habits   Generally, my meals include foods like these bread, pasta, rice, potatoes, corn, crackers, sweet dessert, pop, or juice Almost Everyday   Generally, my meals include foods like these fried meats, brats, burgers, french fries, pizza, cheese, chips, or ice cream Almost Everyday   Eat fast food (like McDonal[employer], [employer], [employer]aco Bell) A Few Times a Week   Eat at a buffet or sit-down restaurant Less Than Weekly   Eat most of my meals in front of the TV or computer Almost Everyday   Often skip meals, eat at random times, have no regular eating times Everyday   Rarely sit down for a meal but snack or graze throughout A Few Times a Week   Eat extra snacks between meals A Few Times a Week   Eat most of my food at the end of the day Almost Everyday   Eat in the middle of the night or wake up at night to eat A Few Times a Week   Eat extra snacks to prevent or correct low blood sugar Never   Eat to prevent acid reflux or stomach pain Never   Worry about not having enough food to eat Never   I eat when I am depressed Never   I eat when I am stressed Once a Week   I eat when I am bored Once a Week   I eat when I am anxious Less Than Weekly   I eat when I am happy or as a reward Less Than Weekly   I feel hungry all the time even if I just have eaten Never   Feeling full is important to me Never   I finish all the food on my plate even if I am already full A Few Times a Week   I can't resist eating delicious food or walk past the good food/smell A Few Times a Week   I eat/snack without noticing that I am eating Less Than Weekly   I eat when I am preparing the meal Never   I eat more than  usual when I see others eating Less Than Weekly   I have trouble not eating sweets, ice cream, cookies, or chips if they are around the house A Few Times a Week   I think about food all day Never   What foods, if any, do you crave? Sweets/Candy/Chocolate   Please list any other foods you crave? Cheeseburger     Meals not prepared at home per week: 5-6        7/12/2024    12:55 PM   Amount of Food   I feel out of control when eating Never   I eat a large amount of food, like a loaf of bread, a box of cookies, a pint/quart of ice cream, all at once Never   I eat a large amount of food even when I am not hungry Never   I eat rapidly Almost Everyday   I eat alone because I feel embarrassed and do not want others to see how much I have eaten Never   I eat until I am uncomfortably full Almost Everyday   I feel bad, disgusted, or guilty after I overeat Weekly           7/12/2024    12:55 PM   Activity/Exercise History   How much of a typical 12 hour day do you spend sitting? Most of the Day   How much of a typical 12 hour day do you spend lying down? Less Than Half the Day   How much of a typical day do you spend walking/standing? Less Than Half the Day   How many hours (not including work) do you spend on the TV/Video Games/Computer/Tablet/Phone? 6 Hours or More   How many times a week are you active for the purpose of exercise? Never   What keeps you from being more active? Pain    Shortness of Breath    Lack of Time    Too tired   How many total minutes do you spend doing some activity for the purpose of exercising when you exercise? None     He is struggling with R knee and L hip pain. He also struggles with sciatica. He hasn't been able to get in for PT yet. He is waiting for his MRI to get done.     PAST MEDICAL HISTORY:  Past Medical History:   Diagnosis Date    Essential hypertension     Testicle cancer (H)     age 23           7/12/2024    12:55 PM   Work/Social History Reviewed With Patient   My employment status  is Full-Time   My job is / [employer]   How much of your job is spent on the computer or phone? 75%   How many hours do you spend commuting to work daily? 30   What is your marital status? /In a Relationship   If in a relationship, is your significant other overweight? No   If you have children, are they overweight? No   Who do you live with? Temporarily   Who does the food shopping? Spouse           7/12/2024    12:55 PM   Mental Health History Reviewed With Patient   Have you ever been physically or sexually abused? No   How often in the past 2 weeks have you felt little interest or pleasure in doing things? For Several Days   Over the past 2 weeks how often have you felt down, depressed, or hopeless? Not at all           7/12/2024    12:55 PM   Sleep History Reviewed With Patient   How many hours do you sleep at night? 6       MEDICATIONS:   Current Outpatient Medications   Medication Sig Dispense Refill    amLODIPine (NORVASC) 10 MG tablet Take 1 tablet (10 mg) by mouth daily for 30 days 30 tablet 0    aspirin 81 MG EC tablet Take 81 mg by mouth daily      atorvastatin (LIPITOR) 80 MG tablet Take 1 tablet (80 mg) by mouth daily 90 tablet 3    ezetimibe (ZETIA) 10 MG tablet Take 1 tablet (10 mg) by mouth daily 90 tablet 3    hydrALAZINE (APRESOLINE) 25 MG tablet Take 1 tablet (25 mg) by mouth every 8 hours for 30 days 90 tablet 0    lisinopril (ZESTRIL) 20 MG tablet Take 1 tablet (20 mg) by mouth daily for 30 days 30 tablet 0    potassium chloride tyshawn ER (KLOR-CON M10) 10 MEQ CR tablet Take 2 tablets (20 mEq) by mouth daily for 30 days 60 tablet 0    spironolactone (ALDACTONE) 25 MG tablet Take 1 tablet (25 mg) by mouth 2 times daily for 30 days 60 tablet 0     ROS  General  Fatigue: yes- doesn't get enough  HEENT  Hx of glaucoma: no  Vision changes: no  Cardiovascular  Hx of heart disease: no- but uncontrolled hypertension  Chest Pain with Exertion: no  Palpitations:  yes  Pulmonary  Shortness of breath at rest: no  Shortness of breath with exertion: yes  Gastrointestinal  Pancreatitis: no  Psychiatric  Moods Stable: yes  Endocrine  Polydipsia: no  No personal or family history of medullary thyroid cancer: no  No personal or family history of MEN2   Neurologic  Hx of seizures: no  Migraine headaches: history of    Birth control: male  Kidney stones: not discussed     ALLERGIES:   Allergies   Allergen Reactions    Codeine      Other reaction(s): GI intolerance  Verified       PHYSICAL EXAM:  Wt Readings from Last 4 Encounters:   07/15/24 (!) 176.9 kg (390 lb)   07/09/24 (!) 177.1 kg (390 lb 8 oz)   07/03/24 (!) 180.2 kg (397 lb 3.2 oz)   07/01/24 (!) 179.3 kg (395 lb 4.8 oz)      BP Readings from Last 3 Encounters:   07/09/24 (!) 178/81   07/03/24 (!) 168/87   07/01/24 (!) 160/90      GENERAL: alert and no distress  EYES: Eyes grossly normal to inspection.  No discharge or erythema, or obvious scleral/conjunctival abnormalities.  RESP: No audible wheeze, cough, or visible cyanosis.    SKIN: Visible skin clear. No significant rash, abnormal pigmentation or lesions.  NEURO: Cranial nerves grossly intact.  Mentation and speech appropriate for age.  PSYCH: Appropriate affect, tone, and pace of words     FOLLOW-UP:   As scheduled with the dietician and in 3-4 months with myself    Total time spent on the date of this encounter doing: chart review, review of test results, patient visit, physical exam, education, counseling, developing plan of care and documenting = 61 minutes.     Sincerely,    QUINTIN Olea MD

## 2024-07-15 ENCOUNTER — VIRTUAL VISIT (OUTPATIENT)
Dept: SURGERY | Facility: CLINIC | Age: 64
End: 2024-07-15
Payer: COMMERCIAL

## 2024-07-15 ENCOUNTER — TELEPHONE (OUTPATIENT)
Dept: FAMILY MEDICINE | Facility: CLINIC | Age: 64
End: 2024-07-15

## 2024-07-15 VITALS — BODY MASS INDEX: 42.66 KG/M2 | WEIGHT: 315 LBS | HEIGHT: 72 IN

## 2024-07-15 DIAGNOSIS — E78.2 MIXED HYPERLIPIDEMIA: ICD-10-CM

## 2024-07-15 DIAGNOSIS — E66.813 CLASS 3 SEVERE OBESITY DUE TO EXCESS CALORIES WITH BODY MASS INDEX (BMI) OF 50.0 TO 59.9 IN ADULT, UNSPECIFIED WHETHER SERIOUS COMORBIDITY PRESENT (H): Primary | ICD-10-CM

## 2024-07-15 DIAGNOSIS — I10 PRIMARY HYPERTENSION: ICD-10-CM

## 2024-07-15 DIAGNOSIS — G47.33 OBSTRUCTIVE SLEEP APNEA SYNDROME: ICD-10-CM

## 2024-07-15 DIAGNOSIS — Z86.69 HISTORY OF MIGRAINE HEADACHES: ICD-10-CM

## 2024-07-15 DIAGNOSIS — E66.01 CLASS 3 SEVERE OBESITY DUE TO EXCESS CALORIES WITH BODY MASS INDEX (BMI) OF 50.0 TO 59.9 IN ADULT, UNSPECIFIED WHETHER SERIOUS COMORBIDITY PRESENT (H): Primary | ICD-10-CM

## 2024-07-15 PROCEDURE — 99205 OFFICE O/P NEW HI 60 MIN: CPT | Mod: 95 | Performed by: FAMILY MEDICINE

## 2024-07-15 ASSESSMENT — PAIN SCALES - GENERAL: PAINLEVEL: MILD PAIN (2)

## 2024-07-15 NOTE — TELEPHONE ENCOUNTER
Patient Returning Call    Reason for call:  Returning Call To Clinic    Information relayed to patient:  Relayed message.  Patient shares he attempted to schedule using [employer] and was given first available in 2025. Patient would like to stay with Mesha MONTEIRO CNP.  Scheduled for Wednesday 7/17 Noon.    Patient has additional questions:  No

## 2024-07-15 NOTE — TELEPHONE ENCOUNTER
Left message for patient to call back. When he calls back please relay message below.      Can we call patient and see if PCP is Myah Rapp or Annita Bhardwaj at this time. If Myah Rapp, please get him in with me this week, okay to override or use lunch hour. If Annita Bhardwaj, please update me so I can let this provider know to get him in.

## 2024-07-15 NOTE — LETTER
"7/15/2024      Stephen Vyas  [address]  [facility] [address]      Dear Colleague,    Thank you for referring your patient, Stephen Vyas, to the [facility] [place]. Please see a copy of my visit note below.        New Medical Weight Management Consult    PATIENT:  Stephen Vyas  MRN:         [id number]  :         1960  DAVID:         7/15/2024    Dear Dr. Christensen,    I had the pleasure of seeing your patient, Stephen Vyas. Full intake/assessment was done to determine barriers to weight loss success and develop a treatment plan. Stephen Vyas is a 63 year old male interested in treatment of medical problems associated with excess weight. He has a height of 6' 0\", a weight of 390 lbs 0 oz, and the calculated Body mass index is 52.89 kg/m .    ASSESSMENT/PLAN:  1. Class 3 severe obesity due to excess calories with body mass index (BMI) of 50.0 to 59.9 in adult, unspecified whether serious comorbidity present (H)  We discussed healthy habits to assist with weight loss. He will work on planning meals ahead of time using the plate method for portion control and macronutrient proportions. His wife will sit in on his dietician visit as she does most of the meal planning and preparation. He will try keeping a food journal.  His exercise will be limited but will be starting PT fairly soon. We discussed medication that may assist with weight loss. He would be a candidate for wegovy or topamax.  Risks/ benefits and possible side effects were discussed and questions were answered. Written information was given.  He is not interested in starting medication at this time.    2. Mixed hyperlipidemia  This may improve with healthy habits and weight loss.     3. Primary hypertension  This may improve with healthy habits and weight loss.     4. Obstructive sleep apnea syndrome  This may improve with healthy habits and weight loss.      5. History of migraine headaches  This " "may improve with healthy habits and weight loss.     He has the following co-morbidities:        7/12/2024    12:55 PM   --   I have the following health issues associated with obesity High Blood Pressure    High Cholesterol    Sleep Apnea   I have the following symptoms associated with obesity Knee Pain    Lower Extremity Swelling    Back Pain    Fatigue             7/12/2024    12:55 PM   Referring Provider   Please name the provider who referred you to Medical Weight Management  If you do not know, please answer \"I Don't Know\" Annita Bhardwaj           7/12/2024    12:55 PM   Weight History   How concerned are you about your weight? Very Concerned   I became overweight As an Adult   The following factors have contributed to my weight gain Change in Schedule    A Health Crisis    Eating Wrong Types of Food    Eating Too Much    Lack of Exercise    Genetic (Runs in the Family)    Stress   I have tried the following methods to lose weight Exercise    Meal Replacements    Fasting   My lowest weight since age 20 was 215   My highest weight since age 20 was 395   The most weight I have ever lost was (lbs) 80   I have the following family history of obesity/being overweight My mother is overweight    My father is overweight    One or more of my siblings are overweight    Many of my relatives are overweight   How has your weight changed over the last year? Gained   How many pounds? 10           7/12/2024    12:55 PM   Diet Recall Review with Patient   If you do eat lunch, what types of food do you typically eat? B: skips, drinks coffee all day long  L: burger, sandwich- often picks up, if he packs lunch it is sandwich and fruit and snack sized chips and yogurt or hot lunch at [facility]- pasta, hot dogs, sometimes eats the vegetables   If you do eat supper, what types of food do you typically eat? burger, potato, vegetable, chicken, eggs, pizza: more starch and protein, sometimes vegetables   If you do snack, what types " of food do you typically eat? chips, donuts, candy bar- they are provided at work- generally eats late afternoon   How many glasses of juice do you drink in a typical day? 0   How many of glasses of milk do you drink in a typical day? 0   How many 8oz glasses of sugar containing drinks such as Rk-Aid/sweet tea do you drink in a day? 0   How many cans/bottles of sugar pop/soda/tea/sports drinks do you drink in a day? 0   How many cans/bottles of diet pop/soda/tea or sports drink do you drink in a day? 2   How often do you have a drink of alcohol? Never           7/12/2024    12:55 PM   Eating Habits   Generally, my meals include foods like these bread, pasta, rice, potatoes, corn, crackers, sweet dessert, pop, or juice Almost Everyday   Generally, my meals include foods like these fried meats, brats, burgers, french fries, pizza, cheese, chips, or ice cream Almost Everyday   Eat fast food (like McDonalds, Burger Chavez, Taco Bell) A Few Times a Week   Eat at a buffet or sit-down restaurant Less Than Weekly   Eat most of my meals in front of the TV or computer Almost Everyday   Often skip meals, eat at random times, have no regular eating times Everyday   Rarely sit down for a meal but snack or graze throughout A Few Times a Week   Eat extra snacks between meals A Few Times a Week   Eat most of my food at the end of the day Almost Everyday   Eat in the middle of the night or wake up at night to eat A Few Times a Week   Eat extra snacks to prevent or correct low blood sugar Never   Eat to prevent acid reflux or stomach pain Never   Worry about not having enough food to eat Never   I eat when I am depressed Never   I eat when I am stressed Once a Week   I eat when I am bored Once a Week   I eat when I am anxious Less Than Weekly   I eat when I am happy or as a reward Less Than Weekly   I feel hungry all the time even if I just have eaten Never   Feeling full is important to me Never   I finish all the food on my plate  even if I am already full A Few Times a Week   I can't resist eating delicious food or walk past the good food/smell A Few Times a Week   I eat/snack without noticing that I am eating Less Than Weekly   I eat when I am preparing the meal Never   I eat more than usual when I see others eating Less Than Weekly   I have trouble not eating sweets, ice cream, cookies, or chips if they are around the house A Few Times a Week   I think about food all day Never   What foods, if any, do you crave? Sweets/Candy/Chocolate   Please list any other foods you crave? Cheeseburger     Meals not prepared at home per week: 5-6        7/12/2024    12:55 PM   Amount of Food   I feel out of control when eating Never   I eat a large amount of food, like a loaf of bread, a box of cookies, a pint/quart of ice cream, all at once Never   I eat a large amount of food even when I am not hungry Never   I eat rapidly Almost Everyday   I eat alone because I feel embarrassed and do not want others to see how much I have eaten Never   I eat until I am uncomfortably full Almost Everyday   I feel bad, disgusted, or guilty after I overeat Weekly           7/12/2024    12:55 PM   Activity/Exercise History   How much of a typical 12 hour day do you spend sitting? Most of the Day   How much of a typical 12 hour day do you spend lying down? Less Than Half the Day   How much of a typical day do you spend walking/standing? Less Than Half the Day   How many hours (not including work) do you spend on the TV/Video Games/Computer/Tablet/Phone? 6 Hours or More   How many times a week are you active for the purpose of exercise? Never   What keeps you from being more active? Pain    Shortness of Breath    Lack of Time    Too tired   How many total minutes do you spend doing some activity for the purpose of exercising when you exercise? None     He is struggling with R knee and L hip pain. He also struggles with sciatica. He hasn't been able to get in for PT yet. He  is waiting for his MRI to get done.     PAST MEDICAL HISTORY:  Past Medical History:   Diagnosis Date     Essential hypertension      Testicle cancer (H)     age 23           7/12/2024    12:55 PM   Work/Social History Reviewed With Patient   My employment status is Full-Time   My job is / [employer]   How much of your job is spent on the computer or phone? 75%   How many hours do you spend commuting to work daily? 30   What is your marital status? /In a Relationship   If in a relationship, is your significant other overweight? No   If you have children, are they overweight? No   Who do you live with? Temporarily   Who does the food shopping? Spouse           7/12/2024    12:55 PM   Mental Health History Reviewed With Patient   Have you ever been physically or sexually abused? No   How often in the past 2 weeks have you felt little interest or pleasure in doing things? For Several Days   Over the past 2 weeks how often have you felt down, depressed, or hopeless? Not at all           7/12/2024    12:55 PM   Sleep History Reviewed With Patient   How many hours do you sleep at night? 6       MEDICATIONS:   Current Outpatient Medications   Medication Sig Dispense Refill     amLODIPine (NORVASC) 10 MG tablet Take 1 tablet (10 mg) by mouth daily for 30 days 30 tablet 0     aspirin 81 MG EC tablet Take 81 mg by mouth daily       atorvastatin (LIPITOR) 80 MG tablet Take 1 tablet (80 mg) by mouth daily 90 tablet 3     ezetimibe (ZETIA) 10 MG tablet Take 1 tablet (10 mg) by mouth daily 90 tablet 3     hydrALAZINE (APRESOLINE) 25 MG tablet Take 1 tablet (25 mg) by mouth every 8 hours for 30 days 90 tablet 0     lisinopril (ZESTRIL) 20 MG tablet Take 1 tablet (20 mg) by mouth daily for 30 days 30 tablet 0     potassium chloride tyshawn ER (KLOR-CON M10) 10 MEQ CR tablet Take 2 tablets (20 mEq) by mouth daily for 30 days 60 tablet 0     spironolactone (ALDACTONE) 25 MG tablet Take 1 tablet (25 mg)  by mouth 2 times daily for 30 days 60 tablet 0     ROS  General  Fatigue: yes- doesn't get enough  HEENT  Hx of glaucoma: no  Vision changes: no  Cardiovascular  Hx of heart disease: no- but uncontrolled hypertension  Chest Pain with Exertion: no  Palpitations: yes  Pulmonary  Shortness of breath at rest: no  Shortness of breath with exertion: yes  Gastrointestinal  Pancreatitis: no  Psychiatric  Moods Stable: yes  Endocrine  Polydipsia: no  No personal or family history of medullary thyroid cancer: no  No personal or family history of MEN2   Neurologic  Hx of seizures: no  Migraine headaches: history of    Birth control: male  Kidney stones: not discussed     ALLERGIES:   Allergies   Allergen Reactions     Codeine      Other reaction(s): GI intolerance  Verified       PHYSICAL EXAM:  Wt Readings from Last 4 Encounters:   07/15/24 (!) 176.9 kg (390 lb)   07/09/24 (!) 177.1 kg (390 lb 8 oz)   07/03/24 (!) 180.2 kg (397 lb 3.2 oz)   07/01/24 (!) 179.3 kg (395 lb 4.8 oz)      BP Readings from Last 3 Encounters:   07/09/24 (!) 178/81   07/03/24 (!) 168/87   07/01/24 (!) 160/90      GENERAL: alert and no distress  EYES: Eyes grossly normal to inspection.  No discharge or erythema, or obvious scleral/conjunctival abnormalities.  RESP: No audible wheeze, cough, or visible cyanosis.    SKIN: Visible skin clear. No significant rash, abnormal pigmentation or lesions.  NEURO: Cranial nerves grossly intact.  Mentation and speech appropriate for age.  PSYCH: Appropriate affect, tone, and pace of words     FOLLOW-UP:   As scheduled with the dietician and in 3-4 months with myself    Total time spent on the date of this encounter doing: chart review, review of test results, patient visit, physical exam, education, counseling, developing plan of care and documenting = 61 minutes.     Sincerely,    QUINTIN Olae MD          Virtual Visit Details    Type of service:  Video Visit     Originating Location (pt. Location): Other at  work in [place]    Distant Location (provider location):  Off-site  Platform used for Video Visit: Tommie    Video start time: 2:45 pm  Video end time: 3:23 pm      Again, thank you for allowing me to participate in the care of your patient.        Sincerely,        QUINTIN Olea MD

## 2024-07-15 NOTE — PATIENT INSTRUCTIONS
Eat Better ? Move More ? Live Well    Eat 3 nutrient-rich meals each day    Don t skip meals--it will cause you to overeat later in the day!    Eating fiber (vegetables/fruits/whole grains) and protein with meals helps you stay full longer    Choose foods with less than 10 grams of sugar and 5 grams of fat per serving to prevent excess calories and weight re-gain  Eat around the same times each day to develop a routine eating schedule   Avoid snacking unless physically hungry.   Planned snacks: 1-2 times per day and no more than 150 calories    Eat protein first   Protein helps with healing, maintaining adequate muscle mass, reducing hunger and optimizing nutritional status   Aim for 60-80 grams of protein per day   Fill up on Fiber   Fiber comes from plants--fruits, veggies, whole grains, nuts/seeds and beans   Fiber is low in calories, high in phytonutrients and helps you stay full longer   Aim for 25-35 grams per day by eating fiber with meals and snacks  Eat [id number]   Take 20-30 minutes to eat each meal by taking small bites, chewing foods to applesauce consistency or 20-30 times before you swallow   Eating foods too fast can delay satiety/fullness signals and increase overeating   Slow down your eating by using toddler utensils, putting your fork/spoon down between bites and not watching TV or emailing during meals!   Keep a Journal         Writing down what you eat, how you feel and when you are active helps you identify new changes to work on from week to week         Look for ways to cut 100 calories from your current diet 2-3 times per day  Drink 64 ounces of 0-Calorie drinks between meals   Water   Zero calorie Propel  or Vitamin Water     SoBe [place]  Zero Calories   Crystal Light , Sugar-Free Rk-Aid , and other sugar-free lemonade or flavored waller   Keep Caffeine to less than 300mg per day ie: 3-6oz cups coffee     Work up to 45-60 minutes of physical activity most days of the week   Helps  with losing weight and prevent regaining those extra pounds!    Do a combo of cardio (walking/water exercises) and strength training (lifting weights/Vinyasa yoga)    Avoid Mindless Eating   Be present when you eat--take note of the smell, taste and quality of your food   Make a list of alternative activities you could do to prevent eating out of boredom/stress  Go for a walk, call a friend, chew gum, paint your nails, re-organize the garage, etc       LEAN PROTEIN SOURCES      Protein Source Portion Calories Grams of Protein                           Nonfat, plain Greek yogurt    (10 grams sugar or less) 3/4 cup (6 oz)  12-17   Light Yogurt (10 grams sugar or less) 3/4 cup (6 oz)  6-8   Protein Shake 1 shake 110-180 15-30   Skim/1% Milk or lactose-free milk 1 cup ( 8 oz)  8   Plain or light, flavored soymilk 1 cup  7-8   Plain or light, hemp milk 1 cup 110 6   Fat Free or 1% Cottage Cheese 1/2 cup 90 15   Part skim ricotta cheese 1/2 cup 100 14   Part skim or reduced fat cheese slices 1 ounce 65-80 8     Mozzarella String Cheese 1 80 8   Canned tuna, chicken, crab or salmon  (canned in water)  1/2 cup 100 15-20   White fish (broiled, grilled, baked) 3 ounces 100 21   [place]/Tuna (broiled, grilled, baked) 3 ounces 150-180 21   Shrimp, Scallops, Lobster, Crab 3 ounces 100 21   Pork loin, Pork Tenderloin 3 ounces 150 21   Boneless, skinless chicken /turkey breast                          (broiled, grilled, baked) 3 ounces 120 21   [place], [place], [place], and Venison 3 ounces 120 21   Lean cuts of red meat and pork (sirloin,   round, tenderloin, flank, ground 93%-96%) 3 ounces 170 21   Lean or Extra Lean Ground Turkey 1/2 cup 150 20   90-95% Lean [place] Burger 1 yesika 140-180 21   Low-fat casserole with lean meat 3/4 cup 200 17   Luncheon Meats                                                        (turkey, lean ham, roast beef, chicken) 3 ounces 100 21   Egg (boiled, poached, scrambled) 1 Egg 60 7    Egg Substitute 1/2 cup 70 10   Nuts (limit to 1 serving per day)  3 Tbsp. 150 7   Nut [place] (peanut, almond)  Limit to 1 serving or less daily 1 Tbsp. 90 4   Soy Burger (varies) 1  15   Garbanzo, Black, Thomas Beans 1/2 cup 110 7   Refried Beans 1/2 cup 100 7   Kidney and Lima beans 1/2 cup 110 7   Tempeh 3 oz 175 18   Vegan crumbles 1/2 cup 100 14   Tofu 1/2 cup 110 14   Chili (beans and extra lean beef or turkey) 1 cup 200 23   Lentil Stew/Soup 1 cup 150 12   Black Bean Soup 1 cup 175 12        Your provider is considering prescribing wegovy weekly injections to help assist you with your weight loss efforts. Please follow the instructions on your prescription as the dose will be gradually tapered up. Do not use if you have a personal or family history of medullary thyroid carcinoma or a personal history of Multiple Endocrine Neoplasia syndrome type 2. Pancreatitis has occurred in some clinical trials. If you develop abdominal pain and fever please stop this medication and call your provider.     1.  Start Wegovy (semaglutide) 0.25 mg once weekly for 4 weeks, then if tolerating increase to 0.5 mg weekly for 4 weeks, then if tolerating increase to 1 mg weekly for 4 weeks, then if tolerating increase to 1.7 mg weekly for 4 weeks, then if tolerating increase to 2.4 mg weekly thereafter.   -Each Wegovy pen is a different color to help identify the different dose strengths   -Each Wegovy pen is a once weekly single-dose prefilled pen with a pen injector already built within the pen. Discard the Wegovy pen after use in sharps container.     If you are struggling with side effects you can taper the dose up more slowly.    2. Storage: make sure that when you get the prescription that you store the prescription in the refrigerator until it is time to use the Wegovy pen.  Once it is time to use the Wegovy pen, you can keep the pen at room temperature and it is good for up to 28 days at room temperature. D    3.   Potential common side effects: nausea, headache, diarrhea, stomach upset.  If these become unmanageable or concerning symptoms, please make sure to call or mychart.        Using Your Wegovy Pen  Medicine (semaglutide)    Storing your pens  Store your pens in the refrigerator until you are ready to use them. Don't let them freeze.  Your pen may be stored at room temperature for 28 days or fewer. Just make sure the temperature doesn't get higher than 86 or lower than 46 degrees Fahrenheit.   Protect the pens from light.  Never use any pens that have .    Check your pen before use:  The liquid in the pen window should be clear and colorless. Bubbles are okay to see.   Do not use the pen if you can see the window contains any specks or it is cloudy or has changed color.  Make sure you have the medication and dose your health care provider prescribed.    Getting ready to inject:   1. Wash and dry your hands well.  2. Make sure the counter you use to place your supplies on is clean.  3. Make sure your injection time will not be interrupted by children or pets.  4. Have alcohol wipes or alcohol and cotton balls available to clean the injection site.   5. Choose your injection site. It can be on your stomach, back of upper arms, or upper legs. Remember to change your injection site each time you inject. Try to be at least 1 inch away from the previous one. Stay at least 1 inch away from your belly button.     Inject your dose:  1. Pull off the grey cap off the pen. Throw it in the trash. Do not put the cap back on the pen.   2. Clean the injection site with alcohol.   3. Push the grey part of the pen firmly against your skin. This will start the injection.  4. When the pen is injecting, you will hear 2 clicks. The first click tells you the injection has started. The second one tells you the injection is continuing.   5. The injection is done when the yellow bar in the glass window at the bottom of the pen has stopped  moving.   6. This injection is given 1 day a week. The pens come in  5 doses ranging from 0.25 mg to 2.4 mg. Each dose comes in a different color pen.  7. If you miss a dose, take is as soon as you remember. Do not take a missed dose, if it is within 2 days of the next dose.    8. Your injection may be best tolerated if given at night.     Disposing of your pens:  Dispose of your pens in a puncture-resistant container (hard plastic bottle) or Sharps container.  Check with the county you live in on how to dispose of the container.  Do not recycle the container with used pens.     Of note, you may not be able to  your medication right away. It may require a prior authorization from your insurance company. This may take a week or more.                 TOPAMAX  We are considering starting topiramate at bedtime.  Start 1/2 tab, 50 mg, for a week. Go up to a full tab after 1 week if tolerated. Contact the nurse via [employer] or call [phone] if you have any questions or concerns. (Do not stop taking it if you don't think it's working. For some people it works even though they do not feel much different.)    Topiramate (Topamax) is a medication that is used most often to treat migraine headaches or for seizures. It has also been found to help with weight loss. Although it's not currently FDA approved for weight loss, it has been used safely for a number of years to help people who are carrying extra weight.     Just how topiramate helps with weight loss has not been exactly determined. However it seems to work on areas of the brain to quiet down signals related to eating.      Topiramate may make you:    >feel less interest in eating in between meals   >think less about food and eating   >find it easier to push the plate away   >find giving up pop easier    >have an easier time eating less    For some of our patients, the pills work right away. They feel and think quite differently about food. Other patients don't  feel much of a change but find in fact they have lost weight! Like all weight loss medications, topiramate works best when you help it work.  This means:   1) Have less tempting high calorie (fattening) food around the house or office    2) Have lower calorie food (fruits, vegetables,low fat meats and dairy) for snacks    3) Eat out only one time or less each week.   4) Eat your meals at a table with the TV or computer off.    Side-effects. Topiramate is generally well tolerated. The main side-effects we see are:   Tingling in hands,feet, or face (usually not very troublesome)   Mental confusion and word finding trouble (about 10% of patients have this.)     Feeling sleepy or a bit dopey- this goes away very soon after starting.    One of the dangers of topiramate is the possibility of birth defects--if you get pregnant when you are on it, there is the risk that your baby will be born with a cleft lip or palate.  If you are on topiramate and of child bearing age, you need to be on a reliable form of birth control or refrain from sexual intercourse.     Please refer to the pharmacy insert for more information on side-effects. Since many pharmacists are not familiar with the use of topiramate in weight loss, calling the clinic will get you the most accurate information on the use of this medication for weight loss.     In order to get refills of this or any medication we prescribe you must be seen in the medical weight mgmt clinic every 2-3 months.

## 2024-07-15 NOTE — PROGRESS NOTES
Virtual Visit Details    Type of service:  Video Visit     Originating Location (pt. Location): Other at work in [place]    Distant Location (provider location):  Off-site  Platform used for Video Visit: Tommie    Video start time: 2:45 pm  Video end time: 3:23 pm

## 2024-07-15 NOTE — NURSING NOTE
Current patient location:  [address] [place] [address] [place]    Is the patient currently in the state of [place]? YES    Visit mode:VIDEO    If the visit is dropped, the patient can be reconnected by: VIDEO VISIT: Text to cell phone:   Telephone Information:   Mobile [phone]       Will anyone else be joining the visit? NO  (If patient encounters technical issues they should call [phone]    How would you like to obtain your AVS? MyChart    Are changes needed to the allergy or medication list? No    Are refills needed on medications prescribed by this physician? NO    Reason for visit: Consult    Kelly CAMACHO     Here primarily for skin infection; Hit his leg on something this past week  Saw blood at the time then stopped bleeding. Over past few days has gotten a little more red. Feels well otherwise without any fever, chills, discharge or leg swelling  No pain with walking    Secondarily he is here for left shoulder pain  While he was trying to look at above mentioned injury he lost his balance and fell to the side hitting his left shoulder against a cabinet. Has since been sore. Pain only present with movement. No weakness, numbness or tingling. No head injury or LOC. Has improved some since onset. Past Medical History:   Diagnosis Date    Adverse effect of anesthesia     difficult with waking up     Allergic rhinitis     Arthritis     Asthma     Atrial flutter ([address] 9/12/2014    CAD (coronary artery disease)     Dr. Nohelia Fuentes    Calculus of kidney     Chest pain 2006    Normal stress echo    Chronic bronchitis     Chronic bronchitis ([address] 10/3/2012    Chronic pain     knees    Erectile dysfunction     GERD (gastroesophageal reflux disease)     hiatal hernia    Gout 1/24/2013    Hemoptysis 2008    Work up by Dr. White Asp;  Negative    Hiatal hernia     Hypercholesterolemia     Hypertension     Ill-defined condition     bronchitis    Melanoma ([address]     back    Nausea & vomiting     Pacemaker     Dr. Kevon Britton [facility]) 6/4/2012    S/P ablation of atrial flutter 9/12/2014    Sleep apnea     states never had test for apnea    Stroke [facility]) 2009     tia no residual problems    Thyroid disease     Valvular heart disease         Past Surgical History:   Procedure Laterality Date    HX AFIB ABLATION  2014    Dr. Esme Sorianor    ruptured appendix    HX HEART CATHETERIZATION      [address]    HX HERNIA REPAIR  12/17/14    Recurrent left inguinal hernia; Dr. Jonas Martinez      5 hernia repairs total    HX PACEMAKER  12/2014    HX PACEMAKER PLACEMENT  2014    Dr. Edmond Garcia    Kidney stone extraction    [place] CARDIAC SURG PROCEDURE UNLIST      ablation    [place] COLSC FLX W/RMVL OF TUMOR POLYP LESION SNARE TQ  4/21/2011              Family History   Problem Relation Age of Onset    Stroke Father     Heart Disease Mother         Social History     Socioeconomic History    Marital status:      Spouse name: Not on file    Number of children: Not on file    Years of education: Not on file    Highest education level: Not on file   Occupational History    Not on file   Tobacco Use    Smoking status: Never Smoker    Smokeless tobacco: Never Used   Vaping Use    Vaping Use: Never used   Substance and Sexual Activity    Alcohol use: Yes     Alcohol/week: 3.0 standard drinks     Types: 1 Shots of liquor, 2 Standard drinks or equivalent per week     Comment: rare    Drug use: No     Types: OTC, Prescription    Sexual activity: Not Currently     Partners: Female   Other Topics Concern    Not on file   Social History Narrative    Not on file     Social Determinants of Health     Financial Resource Strain:     Difficulty of Paying Living Expenses: Not on file   Food Insecurity:     Worried About Running Out of Food in the Last Year: Not on file    Ita of Food in the Last Year: Not on file   Transportation Needs:     Lack of Transportation (Medical): Not on file    Lack of Transportation (Non-Medical):  Not on file   Physical Activity:     Days of Exercise per Week: Not on file    Minutes of Exercise per Session: Not on file   Stress:     Feeling of Stress : Not on file   Social Connections:     Frequency of Communication with Friends and Family: Not on file    Frequency of Social Gatherings with Friends and Family: Not on file    Attends [employer] Services: Not on file    Active Member of Clubs or Organizations: Not on file    Attends Club or Organization Meetings: Not on file    Marital Status: Not on file   Intimate Partner Violence:     Fear of Current or Ex-Partner: Not on file    Emotionally Abused: Not on file    Physically Abused: Not on file    Sexually Abused: Not on file   Housing Stability:     Unable to Pay for Housing in the Last Year: Not on file    Number of [place] in the Last Year: Not on file    Unstable Housing in the Last Year: Not on file                ALLERGIES: Azithromycin, Gadolinium-containing contrast media, Acetaminophen, Contrast dye [iodine], Crestor [rosuvastatin], Levaquin [levofloxacin], Medrol [methylprednisolone], Norvasc [amlodipine], Nsaids (non-steroidal anti-inflammatory drug), Percocet [oxycodone-acetaminophen], Ranexa [ranolazine], Simvastatin, Voltaren [diclofenac sodium], and Zetia [ezetimibe]    Review of Systems   All other systems reviewed and are negative. Vitals:    07/02/22 1423   BP: 122/66   Pulse: 72   Resp: 18   Temp: 98.4 °F (36.9 °C)   SpO2: 95%   Weight: 232 lb (105.2 kg)   Height: 6' 1\" (1.854 m)       Physical Exam  Vitals reviewed. Constitutional:       Appearance: Normal appearance. HENT:      Head: Normocephalic and atraumatic. Eyes:      Extraocular Movements: Extraocular movements intact. Cardiovascular:      Rate and Rhythm: Normal rate. Rhythm irregular. Pulses: Normal pulses. Heart sounds: Normal heart sounds. Pulmonary:      Effort: Pulmonary effort is normal.      Breath sounds: Normal breath sounds. Musculoskeletal:      Right shoulder: No swelling, deformity, effusion, laceration, tenderness, bony tenderness or crepitus. Normal range of motion. Normal strength. Normal pulse. Left shoulder: Tenderness present. No swelling, deformity, effusion, laceration, bony tenderness or crepitus. Normal range of motion. Normal strength. Normal pulse. Arms:    Skin:     Capillary Refill: Capillary refill takes less than 2 seconds.           Neurological:      Mental Status: He is alert and oriented to person, place, and time. Psychiatric:         Mood and Affect: Mood normal.         Behavior: Behavior normal.         Thought Content: Thought content normal.         MDM     Differential Diagnosis; Clinical Impression; Plan:       CLINICAL IMPRESSION:  (M25.512) Acute pain of left shoulder  (primary encounter diagnosis)  (T14.8XXA,  L08.9) Infected skin tear    Orders Placed This Encounter      cephALEXin (KEFLEX) 500 mg capsule          Sig: Take 1 Capsule by mouth two (2) times a day for 10 days. Dispense:  20 Capsule          Refill:  0    Plan:  Infected skin tear- start cephalexin, keep covered, monitor daily    Left shoulder pain x ray performed due to fall- result no fracture or dislocation (see radiology read below)  OTC tylenol  Cool compresses  Is improving without any concerning neurovascular or strength deficits. We have reviewed concerning signs/symptoms, normal vs abnormal progression of medical condition and when to seek immediate medical attention. Schedule with PCP or Urgent Care immediately for worsening or new symptoms. See your PCP if there is not at least some improvement in symptoms within the next 1 week  You should see your PCP for updates on your routine health maintenance.              Procedures

## 2024-07-17 ENCOUNTER — OFFICE VISIT (OUTPATIENT)
Dept: FAMILY MEDICINE | Facility: CLINIC | Age: 64
End: 2024-07-17
Payer: COMMERCIAL

## 2024-07-17 VITALS
OXYGEN SATURATION: 95 % | TEMPERATURE: 97.7 F | HEART RATE: 86 BPM | HEIGHT: 72 IN | SYSTOLIC BLOOD PRESSURE: 127 MMHG | BODY MASS INDEX: 42.66 KG/M2 | WEIGHT: 315 LBS | RESPIRATION RATE: 16 BRPM | DIASTOLIC BLOOD PRESSURE: 83 MMHG

## 2024-07-17 DIAGNOSIS — I10 HYPERTENSION, UNSPECIFIED TYPE: Primary | ICD-10-CM

## 2024-07-17 DIAGNOSIS — R89.9 ABNORMAL LABORATORY TEST RESULT: ICD-10-CM

## 2024-07-17 DIAGNOSIS — Z86.39 HISTORY OF HYPOKALEMIA: ICD-10-CM

## 2024-07-17 LAB
ANION GAP SERPL CALCULATED.3IONS-SCNC: 8 MMOL/L (ref 7–15)
BUN SERPL-MCNC: 18.8 MG/DL (ref 8–23)
CALCIUM SERPL-MCNC: 9 MG/DL (ref 8.8–10.4)
CHLORIDE SERPL-SCNC: 104 MMOL/L (ref 98–107)
CREAT SERPL-MCNC: 1.41 MG/DL (ref 0.67–1.17)
EGFRCR SERPLBLD CKD-EPI 2021: 57 ML/MIN/1.73M2
GLUCOSE SERPL-MCNC: 100 MG/DL (ref 70–99)
HCO3 SERPL-SCNC: 30 MMOL/L (ref 22–29)
POTASSIUM SERPL-SCNC: 4.6 MMOL/L (ref 3.4–5.3)
SODIUM SERPL-SCNC: 142 MMOL/L (ref 135–145)

## 2024-07-17 PROCEDURE — 80048 BASIC METABOLIC PNL TOTAL CA: CPT

## 2024-07-17 PROCEDURE — 99213 OFFICE O/P EST LOW 20 MIN: CPT

## 2024-07-17 PROCEDURE — 36415 COLL VENOUS BLD VENIPUNCTURE: CPT

## 2024-07-17 ASSESSMENT — ENCOUNTER SYMPTOMS
DYSPHORIC MOOD: 0
BACK PAIN: 1
DIAPHORESIS: 1
DIZZINESS: 0
ABDOMINAL PAIN: 0
DIFFICULTY URINATING: 0
WHEEZING: 0
HEADACHES: 0
ARTHRALGIAS: 1
SHORTNESS OF BREATH: 1

## 2024-07-17 ASSESSMENT — PAIN SCALES - GENERAL: PAINLEVEL: NO PAIN (0)

## 2024-07-17 NOTE — PROGRESS NOTES
Assessment & Plan     Hypertension, unspecified type  Controlled. Continue current medications. No change in management, unless alteration in potassium level. No symptoms of chest pain, palpitations, dizziness, headaches, vision changes, SOB, orthopnea or peripheral edema. Increase dietary efforts and physical activity.    History of hypokalemia  Admitted, back on medications (spironolactone, lisinopril, hydralazine, and amlodipine). Reassess potassium today, may need to adjust medications based on result.   - Basic metabolic panel  (Ca, Cl, CO2, Creat, Gluc, K, Na, BUN)    Abnormal laboratory test result  Elevated serum metanephrine, concern for pheochromocytoma. Referral to endocrinology placed. Ordred 24 hour urine metanephrine and MR adrenal imaging for further work up.   - MR Adrenal wo and w Contrast; Future  - Adult Endocrinology  Referral; Future  - Metanephrine random or 24 hr urine; Future  - Metanephrine random or 24 hr urine    MED REC REQUIRED  Post Medication Reconciliation Status: discharge medications reconciled, continue medications without change    Janelle Mitchell is a 63 year old, presenting for the following health issues:  Hospital F/U      7/17/2024    11:51 AM   Additional Questions   Roomed by krishna RICHTER     Recently admited for high potassium. Went to urgent care due to sweating like crazy and could feel his pulse like crazy. Noticed potassium was really low, sent to the ER for this and because BP would not come down. Discharged a few days ago.     Working on weight loss. Saw Dr. Olea, follow-up with nurtionist. Discussed options, at this time he would like to try wihtout medications and surgery.     Cardiology appointment coming up.     Feeling okay today. Still sweating.     Taking medications. No chest pain, SOB at rest. Mild swelling in the ankles at the end of the day, indentation where socks sit. Goes away during the night. Voiding without difficulty.     Does have  "AIKEN. Feels this is from weight and need to increase exercise.     Went to urgent care due to sweating like crazy and could feel his pulse like crazy. Noticed potassium was really low, sent to the ER for this and because BP would not come down.     Hospital Follow-up Visit:    Hospital/Nursing Home/IP Rehab Facility: [facility]  Date of Admission: 07/06/24  Date of Discharge: 07/09/24  Reason(s) for Admission: HTN   Was the patient in the ICU or did the patient experience delirium during hospitalization?  No  Do you have any other stressors you would like to discuss with your provider? No    Problems taking medications regularly:  None  Medication changes since discharge: None  Problems adhering to non-medication therapy:  None    Summary of hospitalization:  [facility] discharge summary reviewed  Diagnostic Tests/Treatments reviewed.  Follow up needed: BMP, 24 hour urine, MRI of adrenal glands  Other Healthcare Providers Involved in Patient s Care:         Specialist appointment - cardiology, PT, radiology, sleep medicine, weight managment  Update since discharge: stable.         Plan of care communicated with patient   Review of Systems   Constitutional:  Positive for diaphoresis.   Eyes:  Negative for visual disturbance.   Respiratory:  Positive for shortness of breath. Negative for wheezing.    Cardiovascular:  Negative for chest pain.   Gastrointestinal:  Negative for abdominal pain.   Genitourinary:  Negative for difficulty urinating.   Musculoskeletal:  Positive for arthralgias and back pain.   Neurological:  Negative for dizziness and headaches.   Psychiatric/Behavioral:  Negative for dysphoric mood.          Objective    /83 (BP Location: Right arm, Patient Position: Sitting)   Pulse 86   Temp 97.7  F (36.5  C) (Oral)   Resp 16   Ht 1.82 m (5' 11.65\")   Wt (!) 180.5 kg (398 lb)   SpO2 95%   BMI 54.50 kg/m    Body mass index is 54.5 kg/m .  Physical " Exam  Constitutional:       General: He is not in acute distress.  Cardiovascular:      Rate and Rhythm: Normal rate and regular rhythm.   Pulmonary:      Effort: No respiratory distress.   Musculoskeletal:      Right lower leg: No edema.      Left lower leg: No edema.   Neurological:      General: No focal deficit present.      Mental Status: He is alert. Mental status is at baseline.   Psychiatric:         Mood and Affect: Mood normal.         Thought Content: Thought content normal.        At the end of the visit, I confirmed understanding of what was discussed. Stephen has no further questions or concerns that were brought up at this time.      Myah Rapp DNP, APRN, FNP-C

## 2024-07-17 NOTE — PATIENT INSTRUCTIONS
The condition we are trying to rule in or rule out is called pheochromocytoma. We will start with an urine 24 hour collect and imaging. I have referred you to endocrinology as well.

## 2024-07-18 DIAGNOSIS — R79.89 ELEVATED NOREPINEPHRINE LEVEL: Primary | ICD-10-CM

## 2024-07-19 ENCOUNTER — TELEPHONE (OUTPATIENT)
Dept: ENDOCRINOLOGY | Facility: CLINIC | Age: 64
End: 2024-07-19
Payer: COMMERCIAL

## 2024-07-19 NOTE — TELEPHONE ENCOUNTER
Patient confirmed scheduled appointment:  Date: 11/26   Time: 10 am   Visit type: new endocrine   Provider: Mark Anthony   Location: virtual   Testing/imaging:   Additional notes:   Please schedule first available with any provider who manages hormone imbalance until we receive the endocrine specific diagnosis from the referring team.   Thank you.   Lori Us RN on 7/18/24 at 1:22 PM     Brent Pathak on 7/19/2024 at 9:43 AM

## 2024-07-22 ENCOUNTER — VIRTUAL VISIT (OUTPATIENT)
Dept: SURGERY | Facility: CLINIC | Age: 64
End: 2024-07-22
Payer: COMMERCIAL

## 2024-07-22 DIAGNOSIS — E66.01 CLASS 3 SEVERE OBESITY DUE TO EXCESS CALORIES WITH BODY MASS INDEX (BMI) OF 50.0 TO 59.9 IN ADULT, UNSPECIFIED WHETHER SERIOUS COMORBIDITY PRESENT (H): Primary | ICD-10-CM

## 2024-07-22 DIAGNOSIS — E66.813 CLASS 3 SEVERE OBESITY DUE TO EXCESS CALORIES WITH BODY MASS INDEX (BMI) OF 50.0 TO 59.9 IN ADULT, UNSPECIFIED WHETHER SERIOUS COMORBIDITY PRESENT (H): Primary | ICD-10-CM

## 2024-07-22 DIAGNOSIS — I10 PRIMARY HYPERTENSION: ICD-10-CM

## 2024-07-22 DIAGNOSIS — Z71.3 NUTRITIONAL COUNSELING: ICD-10-CM

## 2024-07-22 PROCEDURE — 97802 MEDICAL NUTRITION INDIV IN: CPT | Mod: 95 | Performed by: DIETITIAN, REGISTERED

## 2024-07-22 NOTE — LETTER
7/22/2024      Stephen Vyas  [address]  [facility] [address]      Dear Colleague,    Thank you for referring your patient, Stephen Vyas, to the [facility] [place]. Please see a copy of my visit note below.    Stephen Vyas is a 63 year old who is being evaluated via a billable video visit.        How would you like to obtain your AVS? MyChart  If the video visit is dropped, the invitation should be resent by: Text to cell phone: [phone]  Will anyone else be joining your video visit? No          Medical Weight Loss Initial Diet Evaluation  Assessment:  This patient was referred by Dr. Olea for MNT as treatment for Morbid obesity   Stephen is presenting today for a new weight management nutrition consultation. Pt has had an initial appointment with Dr. Olea.    Weight loss medication:  none .     Personal Goals: slow weight gain progressively over time, would like to lose weight to help improve mobility, improve blood pressure and sleep  -working more sedentary job     Anthropometrics:    Pt's weight is 0 lbs 0 oz  Initial weight: 390lb  Weight change: none  BMI: There is no height or weight on file to calculate BMI.   Ideal body weight: 76.8 kg (169 lb 5.2 oz)  Adjusted ideal body weight: 118.3 kg (260 lb 12.7 oz)  Estimated RMR (Wise-St Jeor equation):  2596 kcals x 1.2 (sedentary) = 3570 kcals (for weight maintenance)      Recommended Protein Intake: 100-120 grams of protein/day    Medical History:  Patient Active Problem List   Diagnosis     Primary hypertension     Presbyopia     Hypokalemia     Lipids abnormal     Lumbar radiculopathy     Class 3 severe obesity due to excess calories with body mass index (BMI) of 50.0 to 59.9 in adult, unspecified whether serious comorbidity present (H)     Obstructive sleep apnea syndrome     Pain in back     Spondylosis of lumbar spine     Status post orchiectomy     Mixed hyperlipidemia     Bilateral impacted  "cerumen     Dizziness     Pre-syncope      Diabetes: none  HbA1c:  No results found for: \"HGBA1C\"    Nutrition History:   Food allergies/intolerances/cultural or religous food customs: No   Weight loss history: not assessed  Vitamins/Mineral Supplementation: vitamin D daily, vitamin C  -Doesn't plan meals, wife does a lot of the meal planning and preparation    Dietary Recall:  Breakfast: many times will skip, drinking a lot of coffee  Lunch: mid-afternoon (3-4p) starts to get hungry and will grab donuts, cookies, chips- many times will eat community meal- tacos, pizza, burgers, fettucine- many times will pack a lunch- sandwich, protein bar, fruit and yogurt  Picky about veggies-   Dinner: many times doesn't eat until late- a lot of fast food  Last night had pizza  Typical Snacks: late at night will snack  Not getting much sleep    Beverages: mostly coffee, sparkling water occasionally, diet soda every once in a while     Exercise: working sedentary job, enjoys hiking    Nutrition Diagnosis (PES statement):   Morbid obesity related to excessive energy intake as evidence by inconsistent meals, lack of activity, frequent fast food and BMI of 54     Nutrition Intervention  Food and/or Nutrient Delivery   Placed emphasis on importance of developing a healthy meal routine, aiming for 3 meals a day and no snacks.  Discussed using a protein supplement as a meal replacement.    Nutrition Education   Discussed with patient how to build a meal: the importance of including a lean/low fat protein at each meal, include a source of vegetables at a minimum of lunch and dinner and limiting carbohydrate intake   Educated on sources of lean protein, portion sizes, the amount of grams found in each source. Recommend patient to aim for 20-30g protein at each meal.  Discussed the importance of adequate hydration, with emphasis on drinking 64oz of water or zero calorie beverages per day.    Nutrition Counseling   Encouraged importance of " developing routine exercise for health benefits and weight loss.  Discussed mindful eating techniques such as eating off smaller places/bowls, taking 20-30 minutes to eat in a calm/relaxed environment without distractions.    Goals established by patient:   Eat a breakfast daily  3 cup of water per day (1 cup=24oz)    Handouts provided:  Fast food recommendations  Protein shakes and bars  Proteins ources    Assessment/Plan:    Pt will follow up in 1 month(s) with bariatrician and 4 month(s) with dietitian.       Video-Visit Details    Type of service:  Video Visit    Video Start Time (time video started): 10:30a    Video End Time (time video stopped): 11:00a    Originating Location (pt. Location): Home      Distant Location (provider location):  Off-site    Mode of Communication:  Video Conference via [facility]    Physician has received verbal consent for a Video Visit from the patient? Yes      Nicki Helms RD        Again, thank you for allowing me to participate in the care of your patient.        Sincerely,        Nicki Helms RD

## 2024-07-22 NOTE — PROGRESS NOTES
"Stephen Vyas is a 63 year old who is being evaluated via a billable video visit.        How would you like to obtain your AVS? MyChart  If the video visit is dropped, the invitation should be resent by: Text to cell phone: [phone]  Will anyone else be joining your video visit? No          Medical Weight Loss Initial Diet Evaluation  Assessment:  This patient was referred by Dr. Olea for MNT as treatment for Morbid obesity   Stephen is presenting today for a new weight management nutrition consultation. Pt has had an initial appointment with Dr. Olea.    Weight loss medication:  none .     Personal Goals: slow weight gain progressively over time, would like to lose weight to help improve mobility, improve blood pressure and sleep  -working more sedentary job     Anthropometrics:    Pt's weight is 0 lbs 0 oz  Initial weight: 390lb  Weight change: none  BMI: There is no height or weight on file to calculate BMI.   Ideal body weight: 76.8 kg (169 lb 5.2 oz)  Adjusted ideal body weight: 118.3 kg (260 lb 12.7 oz)  Estimated RMR (Ray-St Jeor equation):  2596 kcals x 1.2 (sedentary) = 3570 kcals (for weight maintenance)      Recommended Protein Intake: 100-120 grams of protein/day    Medical History:  Patient Active Problem List   Diagnosis    Primary hypertension    Presbyopia    Hypokalemia    Lipids abnormal    Lumbar radiculopathy    Class 3 severe obesity due to excess calories with body mass index (BMI) of 50.0 to 59.9 in adult, unspecified whether serious comorbidity present (H)    Obstructive sleep apnea syndrome    Pain in back    Spondylosis of lumbar spine    Status post orchiectomy    Mixed hyperlipidemia    Bilateral impacted cerumen    Dizziness    Pre-syncope      Diabetes: none  HbA1c:  No results found for: \"HGBA1C\"    Nutrition History:   Food allergies/intolerances/cultural or religous food customs: No   Weight loss history: not assessed  Vitamins/Mineral Supplementation: vitamin D daily, " vitamin C  -Doesn't plan meals, wife does a lot of the meal planning and preparation    Dietary Recall:  Breakfast: many times will skip, drinking a lot of coffee  Lunch: mid-afternoon (3-4p) starts to get hungry and will grab donuts, cookies, chips- many times will eat community meal- tacos, pizza, burgers, fettucine- many times will pack a lunch- sandwich, protein bar, fruit and yogurt  Picky about veggies-   Dinner: many times doesn't eat until late- a lot of fast food  Last night had pizza  Typical Snacks: late at night will snack  Not getting much sleep    Beverages: mostly coffee, sparkling water occasionally, diet soda every once in a while     Exercise: working sedentary job, enjoys hiking    Nutrition Diagnosis (PES statement):   Morbid obesity related to excessive energy intake as evidence by inconsistent meals, lack of activity, frequent fast food and BMI of 54     Nutrition Intervention  Food and/or Nutrient Delivery   Placed emphasis on importance of developing a healthy meal routine, aiming for 3 meals a day and no snacks.  Discussed using a protein supplement as a meal replacement.    Nutrition Education   Discussed with patient how to build a meal: the importance of including a lean/low fat protein at each meal, include a source of vegetables at a minimum of lunch and dinner and limiting carbohydrate intake   Educated on sources of lean protein, portion sizes, the amount of grams found in each source. Recommend patient to aim for 20-30g protein at each meal.  Discussed the importance of adequate hydration, with emphasis on drinking 64oz of water or zero calorie beverages per day.    Nutrition Counseling   Encouraged importance of developing routine exercise for health benefits and weight loss.  Discussed mindful eating techniques such as eating off smaller places/bowls, taking 20-30 minutes to eat in a calm/relaxed environment without distractions.    Goals established by patient:   Eat a breakfast  daily  3 cup of water per day (1 cup=24oz)    Handouts provided:  Fast food recommendations  Protein shakes and bars  Proteins ources    Assessment/Plan:    Pt will follow up in 1 month(s) with bariatrician and 4 month(s) with dietitian.       Video-Visit Details    Type of service:  Video Visit    Video Start Time (time video started): 10:30a    Video End Time (time video stopped): 11:00a    Originating Location (pt. Location): Home      Distant Location (provider location):  Off-site    Mode of Communication:  Video Conference via [facility]    Physician has received verbal consent for a Video Visit from the patient? Yes      Nicki Helms RD

## 2024-07-25 ENCOUNTER — OFFICE VISIT (OUTPATIENT)
Dept: CARDIOLOGY | Facility: CLINIC | Age: 64
End: 2024-07-25
Attending: INTERNAL MEDICINE
Payer: COMMERCIAL

## 2024-07-25 VITALS
HEIGHT: 72 IN | HEART RATE: 95 BPM | DIASTOLIC BLOOD PRESSURE: 74 MMHG | SYSTOLIC BLOOD PRESSURE: 126 MMHG | WEIGHT: 315 LBS | BODY MASS INDEX: 42.66 KG/M2 | RESPIRATION RATE: 18 BRPM

## 2024-07-25 DIAGNOSIS — G47.33 OBSTRUCTIVE SLEEP APNEA SYNDROME: ICD-10-CM

## 2024-07-25 DIAGNOSIS — E66.01 CLASS 3 SEVERE OBESITY DUE TO EXCESS CALORIES WITH BODY MASS INDEX (BMI) OF 50.0 TO 59.9 IN ADULT, UNSPECIFIED WHETHER SERIOUS COMORBIDITY PRESENT (H): ICD-10-CM

## 2024-07-25 DIAGNOSIS — I10 PRIMARY HYPERTENSION: Primary | ICD-10-CM

## 2024-07-25 DIAGNOSIS — E87.6 HYPOKALEMIA: ICD-10-CM

## 2024-07-25 DIAGNOSIS — E66.813 CLASS 3 SEVERE OBESITY DUE TO EXCESS CALORIES WITH BODY MASS INDEX (BMI) OF 50.0 TO 59.9 IN ADULT, UNSPECIFIED WHETHER SERIOUS COMORBIDITY PRESENT (H): ICD-10-CM

## 2024-07-25 PROCEDURE — 99214 OFFICE O/P EST MOD 30 MIN: CPT | Performed by: INTERNAL MEDICINE

## 2024-07-25 PROCEDURE — G2211 COMPLEX E/M VISIT ADD ON: HCPCS | Performed by: INTERNAL MEDICINE

## 2024-07-25 RX ORDER — LISINOPRIL 20 MG/1
20 TABLET ORAL DAILY
Qty: 90 TABLET | Refills: 3 | Status: SHIPPED | OUTPATIENT
Start: 2024-07-25

## 2024-07-25 RX ORDER — SPIRONOLACTONE 25 MG/1
25 TABLET ORAL
Qty: 180 TABLET | Refills: 3 | Status: SHIPPED | OUTPATIENT
Start: 2024-07-25

## 2024-07-25 RX ORDER — AMLODIPINE BESYLATE 10 MG/1
10 TABLET ORAL DAILY
Qty: 90 TABLET | Refills: 3 | Status: SHIPPED | OUTPATIENT
Start: 2024-07-25

## 2024-07-25 RX ORDER — HYDRALAZINE HYDROCHLORIDE 25 MG/1
25 TABLET, FILM COATED ORAL EVERY 8 HOURS
Qty: 270 TABLET | Refills: 3 | Status: SHIPPED | OUTPATIENT
Start: 2024-07-25

## 2024-07-25 NOTE — PROGRESS NOTES
Thank you, Olamide Hoover, CNP, for asking the [facility] team to see Mr. Stephen Vyas to follow-up on hypertensive urgency.    Assessment/Recommendations   Assessment:    1.  Hypertensive urgency, resolved with addition of multiple blood pressure medications.  Patient was seen in the hospital earlier this month by Dr. Vaca and Dr. Tmolinson with up titration in his medication.  His blood pressure today is at goal and he had similar measurement at primary care recently.  At this point would favor continuation of his current medications.  I did submit new prescriptions for his blood pressure medications for 90-day supply to his preferred pharmacy.  2.  Hypokalemia, resolved.  His last renal profile 8 days ago showed his potassium level up to 4.6.  He is currently on both lisinopril and spironolactone which will help him to keep his potassium level up.  Have told him he can discontinue the potassium supplement once his current supply runs out.  3.  KAY, on nasal CPAP.  He tells me it has been decades since this was followed up on.  Strongly encouraged him to follow-up with sleep medicine as recommended as this may be a contributor to his current issues.  4.  Morbid obesity with BMI of 54.4.    Plan:  1.  Continue current medications.  New prescriptions for 90-day supply were completed for his for blood pressure medications.  2.  Follow-up with sleep medicine as recommended  3.  Follow-up with cardiology on an as needed basis       History of Present Illness    Mr. Stephen Vyas is a 63 year old male with history of morbid obesity, essential hypertension, KAY on nasal CPAP who presented to the hospital earlier July for markedly elevated blood pressure as well as evidence of hypokalemia.  Subsequently underwent workup for hyperaldosteronism which is still in evaluation.  He was placed on several new blood pressure medications including restarting on spironolactone along with potassium  "supplement.  His blood pressures improved and he was subsequently discharged home.  His last 2 blood pressure readings have been excellent with systolic pressures in the 120s.  Does report symptoms of exertional dyspnea which I suspect are multifactorial.  No chest discomfort.    ECG (personally reviewed): No ECG today    Cardiac Imaging Studies (personally reviewed): No new imaging     Physical Examination Review of Systems   /74 (BP Location: Right arm, Patient Position: Sitting, Cuff Size: Adult Large)   Pulse 95   Resp 18   Ht 1.82 m (5' 11.65\")   Wt (!) 180.1 kg (397 lb)   BMI 54.37 kg/m    Body mass index is 54.37 kg/m .  Wt Readings from Last 3 Encounters:   07/25/24 (!) 180.1 kg (397 lb)   07/17/24 (!) 180.5 kg (398 lb)   07/15/24 (!) 176.9 kg (390 lb)     General Appearance:   Awake, Alert, No acute distress.   HEENT:  No scleral icterus; the mucous membranes were pink and moist.   Neck: No cervical bruits or jugular venous distention    Chest: The spine was straight. The chest was symmetric.   Lungs:   Respirations unlabored; the lungs are clear to auscultation. No wheezing   Cardiovascular:   Regular rate and rhythm.  S1, S2 normal.  No murmur or gallop   Abdomen:  No organomegaly, masses, bruits, or tenderness. Bowels sounds are present   Extremities: No peripheral edema bilaterally   Skin: No xanthelasma. Warm, Dry.   Musculoskeletal: No tenderness.   Neurologic: Mood and affect are appropriate.    Enc Vitals  BP: 126/74  Pulse: 95  Resp: 18  Weight: (!) 180.1 kg (397 lb) (With shoes.)  Height: 182 cm (5' 11.65\")                                         Medical History  Surgical History Family History Social History   Past Medical History:   Diagnosis Date    Essential hypertension     Testicle cancer (H)     age 23    Past Surgical History:   Procedure Laterality Date    RADICAL ORCHIECTOMY Right     age 23    TONSILLECTOMY, ADENOIDECTOMY, COMBINED Bilateral     When he was about 32    Family " History   Problem Relation Age of Onset    Glasses (<7 y/o) Mother     Dementia Mother     Glasses (<7 y/o) Father     Heart Disease Father     Tuberculosis Maternal Grandfather     Heart Disease Paternal Grandmother     Cancer Paternal Grandfather     Social History     Socioeconomic History    Marital status:      Spouse name: Not on file    Number of children: Not on file    Years of education: Not on file    Highest education level: Not on file   Occupational History    Not on file   Tobacco Use    Smoking status: Never     Passive exposure: Never    Smokeless tobacco: Current     Types: Chew   Vaping Use    Vaping status: Never Used   Substance and Sexual Activity    Alcohol use: Not Currently    Drug use: Not Currently    Sexual activity: Yes   Other Topics Concern    Not on file   Social History Narrative    Not on file     Social Determinants of Health     Financial Resource Strain: Low Risk  (6/14/2024)    Financial Resource Strain     Within the past 12 months, have you or your family members you live with been unable to get utilities (heat, electricity) when it was really needed?: No   Food Insecurity: Low Risk  (6/14/2024)    Food Insecurity     Within the past 12 months, did you worry that your food would run out before you got money to buy more?: No     Within the past 12 months, did the food you bought just not last and you didn t have money to get more?: No   Transportation Needs: Low Risk  (6/14/2024)    Transportation Needs     Within the past 12 months, has lack of transportation kept you from medical appointments, getting your medicines, non-medical meetings or appointments, work, or from getting things that you need?: No   Physical Activity: Inactive (6/14/2024)    Exercise Vital Sign     Days of Exercise per Week: 0 days     Minutes of Exercise per Session: 0 min   Stress: No Stress Concern Present (6/14/2024)    [employer] - Occupational Stress  Questionnaire     Feeling of Stress : Only a little   Social Connections: Unknown (6/14/2024)    Social Connection and Isolation Panel [NHANES]     Frequency of Communication with Friends and Family: Not on file     Frequency of Social Gatherings with Friends and Family: Three times a week     Attends Confucianist Services: Not on file     Active Member of Clubs or Organizations: Not on file     Attends Club or Organization Meetings: Not on file     Marital Status: Not on file   Interpersonal Safety: Low Risk  (6/14/2024)    Interpersonal Safety     Do you feel physically and emotionally safe where you currently live?: Yes     Within the past 12 months, have you been hit, slapped, kicked or otherwise physically hurt by someone?: No     Within the past 12 months, have you been humiliated or emotionally abused in other ways by your partner or ex-partner?: No   Housing Stability: Low Risk  (6/14/2024)    Housing Stability     Do you have housing? : Yes     Are you worried about losing your housing?: No          Medications  Allergies   Current Outpatient Medications   Medication Sig Dispense Refill    amLODIPine (NORVASC) 10 MG tablet Take 1 tablet (10 mg) by mouth daily 90 tablet 3    aspirin 81 MG EC tablet Take 81 mg by mouth daily      atorvastatin (LIPITOR) 80 MG tablet Take 1 tablet (80 mg) by mouth daily 90 tablet 3    ezetimibe (ZETIA) 10 MG tablet Take 1 tablet (10 mg) by mouth daily 90 tablet 3    hydrALAZINE (APRESOLINE) 25 MG tablet Take 1 tablet (25 mg) by mouth every 8 hours 270 tablet 3    lisinopril (ZESTRIL) 20 MG tablet Take 1 tablet (20 mg) by mouth daily 90 tablet 3    spironolactone (ALDACTONE) 25 MG tablet Take 1 tablet (25 mg) by mouth 2 times daily 180 tablet 3      Allergies   Allergen Reactions    Codeine      Other reaction(s): GI intolerance  Verified         Lab Results    Chemistry/lipid CBC Cardiac Enzymes/BNP/TSH/INR   Recent Labs   Lab Test 07/17/24  1232 07/06/24  1934 06/14/24  1228    TRIG  --   --  142   LDL  --   --  107*   BUN 18.8   < > 18.3      < > 139   CO2 30*   < > 28    < > = values in this interval not displayed.    Recent Labs   Lab Test 07/07/24  0338   WBC 8.7   HGB 13.1*   HCT 38.9*   MCV 85       Recent Labs   Lab Test 07/07/24  1250   TSH 1.03        A total of 25 minutes was spent reviewing patient's medical records, obtaining history and performing examination, as well as discussing diagnoses/ recommendations with patient and answering all questions.

## 2024-07-25 NOTE — LETTER
7/25/2024    THANIA Blanco CNP  [address] [facility] [address]    RE: Stephen Vyas       Dear Colleague,     I had the pleasure of seeing Stephen Vyas in the [facility].      Thank you, Olamide Hoover CNP, for asking the [facility] team to see Mr. Stephen Vyas to follow-up on hypertensive urgency.    Assessment/Recommendations   Assessment:    1.  Hypertensive urgency, resolved with addition of multiple blood pressure medications.  Patient was seen in the hospital earlier this month by Dr. Vaca and Dr. Tomlinson with up titration in his medication.  His blood pressure today is at goal and he had similar measurement at primary care recently.  At this point would favor continuation of his current medications.  I did submit new prescriptions for his blood pressure medications for 90-day supply to his preferred pharmacy.  2.  Hypokalemia, resolved.  His last renal profile 8 days ago showed his potassium level up to 4.6.  He is currently on both lisinopril and spironolactone which will help him to keep his potassium level up.  Have told him he can discontinue the potassium supplement once his current supply runs out.  3.  KAY, on nasal CPAP.  He tells me it has been decades since this was followed up on.  Strongly encouraged him to follow-up with sleep medicine as recommended as this may be a contributor to his current issues.  4.  Morbid obesity with BMI of 54.4.    Plan:  1.  Continue current medications.  New prescriptions for 90-day supply were completed for his for blood pressure medications.  2.  Follow-up with sleep medicine as recommended  3.  Follow-up with cardiology on an as needed basis       History of Present Illness    Mr. Stephen Vyas is a 63 year old male with history of morbid obesity, essential hypertension, KAY on nasal CPAP who presented to the hospital earlier July for markedly elevated blood pressure as well as evidence of hypokalemia.   "Subsequently underwent workup for hyperaldosteronism which is still in evaluation.  He was placed on several new blood pressure medications including restarting on spironolactone along with potassium supplement.  His blood pressures improved and he was subsequently discharged home.  His last 2 blood pressure readings have been excellent with systolic pressures in the 120s.  Does report symptoms of exertional dyspnea which I suspect are multifactorial.  No chest discomfort.    ECG (personally reviewed): No ECG today    Cardiac Imaging Studies (personally reviewed): No new imaging     Physical Examination Review of Systems   /74 (BP Location: Right arm, Patient Position: Sitting, Cuff Size: Adult Large)   Pulse 95   Resp 18   Ht 1.82 m (5' 11.65\")   Wt (!) 180.1 kg (397 lb)   BMI 54.37 kg/m    Body mass index is 54.37 kg/m .  Wt Readings from Last 3 Encounters:   07/25/24 (!) 180.1 kg (397 lb)   07/17/24 (!) 180.5 kg (398 lb)   07/15/24 (!) 176.9 kg (390 lb)     General Appearance:   Awake, Alert, No acute distress.   HEENT:  No scleral icterus; the mucous membranes were pink and moist.   Neck: No cervical bruits or jugular venous distention    Chest: The spine was straight. The chest was symmetric.   Lungs:   Respirations unlabored; the lungs are clear to auscultation. No wheezing   Cardiovascular:   Regular rate and rhythm.  S1, S2 normal.  No murmur or gallop   Abdomen:  No organomegaly, masses, bruits, or tenderness. Bowels sounds are present   Extremities: No peripheral edema bilaterally   Skin: No xanthelasma. Warm, Dry.   Musculoskeletal: No tenderness.   Neurologic: Mood and affect are appropriate.    Enc Vitals  BP: 126/74  Pulse: 95  Resp: 18  Weight: (!) 180.1 kg (397 lb) (With shoes.)  Height: 182 cm (5' 11.65\")                                         Medical History  Surgical History Family History Social History   Past Medical History:   Diagnosis Date    Essential hypertension     Testicle " cancer (H)     age 23    Past Surgical History:   Procedure Laterality Date    RADICAL ORCHIECTOMY Right     age 23    TONSILLECTOMY, ADENOIDECTOMY, COMBINED Bilateral     When he was about 32    Family History   Problem Relation Age of Onset    Glasses (<9 y/o) Mother     Dementia Mother     Glasses (<9 y/o) Father     Heart Disease Father     Tuberculosis Maternal Grandfather     Heart Disease Paternal Grandmother     Cancer Paternal Grandfather     Social History     Socioeconomic History    Marital status:      Spouse name: Not on file    Number of children: Not on file    Years of education: Not on file    Highest education level: Not on file   Occupational History    Not on file   Tobacco Use    Smoking status: Never     Passive exposure: Never    Smokeless tobacco: Current     Types: Chew   Vaping Use    Vaping status: Never Used   Substance and Sexual Activity    Alcohol use: Not Currently    Drug use: Not Currently    Sexual activity: Yes   Other Topics Concern    Not on file   Social History Narrative    Not on file     Social Determinants of Health     Financial Resource Strain: Low Risk  (6/14/2024)    Financial Resource Strain     Within the past 12 months, have you or your family members you live with been unable to get utilities (heat, electricity) when it was really needed?: No   Food Insecurity: Low Risk  (6/14/2024)    Food Insecurity     Within the past 12 months, did you worry that your food would run out before you got money to buy more?: No     Within the past 12 months, did the food you bought just not last and you didn t have money to get more?: No   Transportation Needs: Low Risk  (6/14/2024)    Transportation Needs     Within the past 12 months, has lack of transportation kept you from medical appointments, getting your medicines, non-medical meetings or appointments, work, or from getting things that you need?: No   Physical Activity: Inactive (6/14/2024)    Exercise Vital Sign      Days of Exercise per Week: 0 days     Minutes of Exercise per Session: 0 min   Stress: No Stress Concern Present (6/14/2024)    [employer] - Occupational Stress Questionnaire     Feeling of Stress : Only a little   Social Connections: Unknown (6/14/2024)    Social Connection and Isolation Panel [NHANES]     Frequency of Communication with Friends and Family: Not on file     Frequency of Social Gatherings with Friends and Family: Three times a week     Attends Buddhism Services: Not on file     Active Member of Clubs or Organizations: Not on file     Attends Club or Organization Meetings: Not on file     Marital Status: Not on file   Interpersonal Safety: Low Risk  (6/14/2024)    Interpersonal Safety     Do you feel physically and emotionally safe where you currently live?: Yes     Within the past 12 months, have you been hit, slapped, kicked or otherwise physically hurt by someone?: No     Within the past 12 months, have you been humiliated or emotionally abused in other ways by your partner or ex-partner?: No   Housing Stability: Low Risk  (6/14/2024)    Housing Stability     Do you have housing? : Yes     Are you worried about losing your housing?: No          Medications  Allergies   Current Outpatient Medications   Medication Sig Dispense Refill    amLODIPine (NORVASC) 10 MG tablet Take 1 tablet (10 mg) by mouth daily 90 tablet 3    aspirin 81 MG EC tablet Take 81 mg by mouth daily      atorvastatin (LIPITOR) 80 MG tablet Take 1 tablet (80 mg) by mouth daily 90 tablet 3    ezetimibe (ZETIA) 10 MG tablet Take 1 tablet (10 mg) by mouth daily 90 tablet 3    hydrALAZINE (APRESOLINE) 25 MG tablet Take 1 tablet (25 mg) by mouth every 8 hours 270 tablet 3    lisinopril (ZESTRIL) 20 MG tablet Take 1 tablet (20 mg) by mouth daily 90 tablet 3    spironolactone (ALDACTONE) 25 MG tablet Take 1 tablet (25 mg) by mouth 2 times daily 180 tablet 3      Allergies   Allergen Reactions    Codeine       Other reaction(s): GI intolerance  Verified         Lab Results    Chemistry/lipid CBC Cardiac Enzymes/BNP/TSH/INR   Recent Labs   Lab Test 07/17/24  1232 07/06/24  1934 06/14/24  1228   TRIG  --   --  142   LDL  --   --  107*   BUN 18.8   < > 18.3      < > 139   CO2 30*   < > 28    < > = values in this interval not displayed.    Recent Labs   Lab Test 07/07/24  0338   WBC 8.7   HGB 13.1*   HCT 38.9*   MCV 85       Recent Labs   Lab Test 07/07/24  1250   TSH 1.03        A total of 25 minutes was spent reviewing patient's medical records, obtaining history and performing examination, as well as discussing diagnoses/ recommendations with patient and answering all questions.                        Thank you for allowing me to participate in the care of your patient.      Sincerely,     Kenyatta Ward MD     [facility]  cc:   Hamida Conte,   [address] [place] [address]

## 2024-07-25 NOTE — PATIENT INSTRUCTIONS
Continue current medications but stop the potassium supplement once you finish out current supply  Follow up with PCP in 2-4 weeks for recheck  Follow up with cardiology as needed   no

## 2024-07-30 DIAGNOSIS — I10 PRIMARY HYPERTENSION: ICD-10-CM

## 2024-07-30 RX ORDER — LISINOPRIL 20 MG/1
20 TABLET ORAL DAILY
Qty: 90 TABLET | Refills: 3 | OUTPATIENT
Start: 2024-07-30

## 2024-08-20 ENCOUNTER — HOSPITAL ENCOUNTER (OUTPATIENT)
Dept: MRI IMAGING | Facility: HOSPITAL | Age: 64
Discharge: HOME | End: 2024-08-20
Attending: NURSE PRACTITIONER
Payer: COMMERCIAL

## 2024-08-20 ENCOUNTER — HOSPITAL ENCOUNTER (OUTPATIENT)
Dept: MRI IMAGING | Facility: HOSPITAL | Age: 64
Discharge: HOME | End: 2024-08-20
Payer: COMMERCIAL

## 2024-08-20 DIAGNOSIS — G89.29 CHRONIC RIGHT-SIDED LOW BACK PAIN WITH RIGHT-SIDED SCIATICA: ICD-10-CM

## 2024-08-20 DIAGNOSIS — M54.41 CHRONIC RIGHT-SIDED LOW BACK PAIN WITH RIGHT-SIDED SCIATICA: ICD-10-CM

## 2024-08-20 DIAGNOSIS — M54.16 RIGHT LUMBAR RADICULOPATHY: ICD-10-CM

## 2024-08-20 DIAGNOSIS — R89.9 ABNORMAL LABORATORY TEST RESULT: ICD-10-CM

## 2024-08-20 PROCEDURE — 74183 MRI ABD W/O CNTR FLWD CNTR: CPT

## 2024-08-20 PROCEDURE — 255N000002 HC RX 255 OP 636: Performed by: NURSE PRACTITIONER

## 2024-08-20 PROCEDURE — 72158 MRI LUMBAR SPINE W/O & W/DYE: CPT

## 2024-08-20 PROCEDURE — A9585 GADOBUTROL INJECTION: HCPCS | Performed by: NURSE PRACTITIONER

## 2024-08-20 RX ORDER — GADOBUTROL 604.72 MG/ML
18 INJECTION INTRAVENOUS ONCE
Status: COMPLETED | OUTPATIENT
Start: 2024-08-20 | End: 2024-08-20

## 2024-08-20 RX ADMIN — GADOBUTROL 18 ML: 604.72 INJECTION INTRAVENOUS at 17:07

## 2024-08-22 DIAGNOSIS — I10 ESSENTIAL HYPERTENSION: ICD-10-CM

## 2024-08-22 RX ORDER — ATORVASTATIN CALCIUM 80 MG/1
80 TABLET, FILM COATED ORAL DAILY
Qty: 90 TABLET | Refills: 2 | Status: SHIPPED | OUTPATIENT
Start: 2024-08-22

## 2024-08-26 ENCOUNTER — MYC MEDICAL ADVICE (OUTPATIENT)
Dept: FAMILY MEDICINE | Facility: CLINIC | Age: 64
End: 2024-08-26
Payer: COMMERCIAL

## 2024-09-04 ENCOUNTER — THERAPY VISIT (OUTPATIENT)
Dept: PHYSICAL THERAPY | Facility: REHABILITATION | Age: 64
End: 2024-09-04
Attending: NURSE PRACTITIONER
Payer: COMMERCIAL

## 2024-09-04 DIAGNOSIS — G89.29 CHRONIC RIGHT-SIDED LOW BACK PAIN WITH RIGHT-SIDED SCIATICA: Primary | ICD-10-CM

## 2024-09-04 DIAGNOSIS — M54.16 RIGHT LUMBAR RADICULOPATHY: ICD-10-CM

## 2024-09-04 DIAGNOSIS — M54.41 CHRONIC RIGHT-SIDED LOW BACK PAIN WITH RIGHT-SIDED SCIATICA: Primary | ICD-10-CM

## 2024-09-04 PROCEDURE — 97535 SELF CARE MNGMENT TRAINING: CPT | Mod: GP | Performed by: PHYSICAL THERAPIST

## 2024-09-04 PROCEDURE — 97161 PT EVAL LOW COMPLEX 20 MIN: CPT | Mod: GP | Performed by: PHYSICAL THERAPIST

## 2024-09-04 NOTE — PROGRESS NOTES
PHYSICAL THERAPY EVALUATION  Type of Visit: Evaluation       Fall Risk Screen:  Fall screen completed by: PT  Have you fallen 2 or more times in the past year?: No  Have you fallen and had an injury in the past year?: No  Is patient a fall risk?: No    Subjective       Presenting condition or subjective complaint: Back pain, sciatic pain occasionally severe, left hip right knee right shoulder pain continuously, severe obesity.  Date of onset: 07/03/24    Relevant medical history: Cancer; Dizziness; High blood pressure; History of fractures; Migraines or headaches; Overweight; Pain at night or rest; Sleep disorder like apnea   Dates & types of surgery: Testicular cancer and lymph nodes 40 years ago.  Adult tonsils and throat 25 years ago.    Prior diagnostic imaging/testing results: MRI     Prior therapy history for the same diagnosis, illness or injury: No      Patient reports last fall was the first onset of sciatic symptoms, it was severe and couldn't walk and would take his breath away. Lasted 10-12 days until it eased up. A few month after this he would move a certain way and would have a recurrence for 10-15 days. Happened again after this, and decided to see his doctor for this.     Also meeting now with weight management. Having intermittent right knee, left hip pain.     Has tightness pain in the morning upon waking up across lower back, some pinching. While laying in bed will have pain and tingling down right leg.     If sleeping on back, wakes up sometimes due to pain.     No recent changes in bowel/bladder function.     Prior Level of Function  Transfers:   Ambulation:   ADL:   IADL:     Living Environment  Social support: With a significant other or spouse   Type of home: House   Stairs to enter the home: Yes 2 Is there a railing: No     Ramp: No   Stairs inside the home: Yes 7 Is there a railing: Yes     Help at home: None  Equipment owned:       Employment: Yes   of non  profit  Hobbies/Interests: Music, woodworking, walking/hiking.    Patient goals for therapy: sleep, walk, exercise.. Used to go on vacations involving walking and hiking trips.     Pain assessment: Location: lower back/Ratin/10     Objective   LUMBAR SPINE EVALUATION  PAIN:   INTEGUMENTARY (edema, incisions):   POSTURE:   GAIT:   Weightbearing Status:   Assistive Device(s):   Gait Deviations: Antalgic  During left stance phase  BALANCE/PROPRIOCEPTION:   WEIGHTBEARING ALIGNMENT:   NON-WEIGHTBEARING ALIGNMENT:    ROM:   (Degrees) Left AROM Left PROM  Right AROM Right PROM   Hip Flexion       Hip Extension       Hip Abduction       Hip Adduction       Hip Internal Rotation       Hip External Rotation       Knee Flexion       Knee Extension       Lumbar Side glide     Lumbar Flexion WFL, walking hands up legs upon return, crepitus in low back noted   Lumbar Extension Limited by 50% with increasing tightness with repetition   Pain:   End feel:   PELVIC/SI SCREEN:   STRENGTH:     MYOTOMES:    Left Right   T12-L3 (Hip Flexion) At least 3/5, not tested due to pain 5   L2-4 (Quads)  5 5   L4 (Ankle DF) 5 5   L5 (Great Toe Ext) 5 5   S1 (Toe Raise) 5 5     DTR S:    Left Right   C5 (Biceps)     C6 (Brachioradialis)     C7 (Triceps)     L4 (Quad) 0 0   S1 (Achilles) 0 0     CORD SIGNS:   DERMATOMES:   NEURAL TENSION:    Left Right   SLR     SLR with DF     Femoral Nerve     Slump  Negative    Gerardo (Lumbar)     Gerardo (Thoracic)     Gerardo (Cervical)     Median     Ulnar     Radial        FLEXIBILITY:   LUMBAR/HIP Special Tests:    PELVIS/SI SPECIAL TESTS:   FUNCTIONAL TESTS:   PALPATION:   SPINAL SEGMENTAL CONCLUSIONS:       10 meter walk test: 7.47 seconds without assistive device    Assessment & Plan   CLINICAL IMPRESSIONS  Medical Diagnosis: M54.41, G89.29 (ICD-10-CM) - Chronic right-sided low back pain with right-sided sciatica  M54.16 (ICD-10-CM) - Right lumbar radiculopathy    Treatment Diagnosis: low back pain with  mobility limitations, muscle power deficits in lumbo-pelvic-hip region, antalgic gait   Impression/Assessment: Patient is a 63 year old male with chief complaints of low back pain, radiation into right lower extremity, as well as left hip and right knee pain.  The following significant findings have been identified: Pain, Decreased ROM/flexibility, Decreased strength, Impaired gait, Impaired muscle performance, and Decreased activity tolerance. These impairments interfere with their ability to perform self care tasks, work tasks, recreational activities, household mobility, and community mobility as compared to previous level of function. Patient will benefit from skilled physical therapy to address impairments and functional limitations in order to achieve their goals.       Clinical Decision Making (Complexity):  Clinical Presentation: Stable/Uncomplicated  Clinical Presentation Rationale: based on medical and personal factors listed in PT evaluation  Clinical Decision Making (Complexity): Low complexity    PLAN OF CARE  Treatment Interventions:  Interventions: Gait Training, Manual Therapy, Neuromuscular Re-education, Therapeutic Activity, Therapeutic Exercise, Self-Care/Home Management    Long Term Goals     PT Goal 1  Goal Identifier: walking  Goal Description: Patient will improve 10 meter walk test to less than 7.0 seconds to reflect improvements in gait and tolerance with community ambulation  Target Date: 11/27/24  PT Goal 2  Goal Identifier: lumbar ROM  Goal Description: Patient will perform standing lumbar flexion and return from flexion with appropriate mechanics and without jose m sign to reflect improvements in lumbar extensor muscle strength  Target Date: 11/27/24  PT Goal 3  Goal Identifier: walking  Goal Description: Patient will be abl eto walk for a total of 10 minutes per day, most days fo the week to improve activity tolerance  Target Date: 10/30/24  PT Goal 4  Goal Identifier: walking  Goal  Description: Patient will walk at least 30 minutes total per day to reflect improvements in activity tolerance  Target Date: 11/27/24      Frequency of Treatment: weekly  Duration of Treatment: 12 weeks    Recommended Referrals to Other Professionals:   Education Assessment:   Learner/Method: Patient    Risks and benefits of evaluation/treatment have been explained.   Patient/Family/caregiver agrees with Plan of Care.     Evaluation Time:             Signing Clinician: Adarsh Alicea PT

## 2024-09-04 NOTE — TELEPHONE ENCOUNTER
Called and discussed, will reach out to radiology regarding lower back finding and update patient. He would prefer to not US further unless severe concern.

## 2024-09-05 DIAGNOSIS — R93.89 ABNORMAL MRI: Primary | ICD-10-CM

## 2024-09-06 NOTE — TELEPHONE ENCOUNTER
Called patient, I talked to reading radiologist who recommends general surgery evaluation for the finding on the back in the MRI as it is unclear what this is. Patient agrees. Referral placed.

## 2024-10-08 NOTE — PROGRESS NOTES
GENERAL SURGICAL CONSULTATION    I was requested by Mesha Rapp to consult on this pt to evaluate them for a cyst on the back.    HPI:  This is a 63 year old male here today with an MRI that showed a cyst on his back.  He is also super morbidly obese and we discussed those issues as well.        Allergies:Codeine    Past Medical History:   Diagnosis Date    Essential hypertension     Testicle cancer (H)     age 23       Past Surgical History:   Procedure Laterality Date    RADICAL ORCHIECTOMY Right     age 23    TONSILLECTOMY, ADENOIDECTOMY, COMBINED Bilateral     When he was about 32       CURRENT MEDS:  Current Outpatient Medications   Medication Sig Dispense Refill    amLODIPine (NORVASC) 10 MG tablet Take 1 tablet (10 mg) by mouth daily 90 tablet 3    aspirin 81 MG EC tablet Take 81 mg by mouth daily      atorvastatin (LIPITOR) 80 MG tablet Take 1 tablet (80 mg) by mouth daily. 90 tablet 2    ezetimibe (ZETIA) 10 MG tablet Take 1 tablet (10 mg) by mouth daily 90 tablet 3    hydrALAZINE (APRESOLINE) 25 MG tablet Take 1 tablet (25 mg) by mouth every 8 hours 270 tablet 3    lisinopril (ZESTRIL) 20 MG tablet Take 1 tablet (20 mg) by mouth daily 90 tablet 3    spironolactone (ALDACTONE) 25 MG tablet Take 1 tablet (25 mg) by mouth 2 times daily 180 tablet 3       Family History   Problem Relation Age of Onset    Glasses (<9 y/o) Mother     Dementia Mother     Glasses (<9 y/o) Father     Heart Disease Father     Tuberculosis Maternal Grandfather     Heart Disease Paternal Grandmother     Cancer Paternal Grandfather      Family history is not pertinent to this patients Chief Complaint.     reports that he has never smoked. He has never been exposed to tobacco smoke. His smokeless tobacco use includes chew. He reports that he does not currently use alcohol. He reports that he does not currently use drugs.    Review of Systems -   10 point Review of systems is negative except for; as mentioned above in HPI and  "PMHx    Vitals: /78 (BP Location: Right arm)   Ht 1.82 m (5' 11.65\")   Wt (!) 174.6 kg (385 lb)   BMI 52.73 kg/m    BMI= Body mass index is 52.73 kg/m .     EXAM:  GENERAL: Super Morbidly Obese male  HEENT: EOMI, Anicteric Sclera, Moist Mucous Membranes,  In Mouth the pt does not have redness or bleeding gums  CARDIOVASCULAR: RRR w/out murmur   CHEST/LUNG: Clear to Auscultation  BACK:  Tennis ball sized cyst in the mid, Right back,  He also has a 1.5 cm skin tag on his R back.   ABDOMEN:  Non tender to palpation, +BS  MUSCULOSKELETAL:  No deformities with good range of motion in all extremities  NEURO: He is ambulatory with good strength in both legs.  HEME/LYMPH: No Cervical Adenopathy or tenderness.     IMAGES:  I evaluated these images myself and he has an MRI     EXAM: MR ADRENAL W/O and W CONTRAST  LOCATION: [facility]  DATE: 8/20/2024     INDICATION: Hypertension. Hypokalemia. Elevated serum metanephrines. History of right testicular cancer at age 23 status post orchiectomy.  COMPARISON: 8/20/2024 MRI lumbar spine  TECHNIQUE: Routine MRI adrenal protocol including T1 in/out phase, diffusion, multiplane T2, and if done with contrast dynamic T1 post contrast.  CONTRAST: 18 ml Gadavist     FINDINGS:     ADRENALS: A circumscribed 1.4 x 1.3 x 1.0 cm fat-containing lesion in the right adrenal gland consistent with a benign myelolipoma. Adrenal glands are otherwise negative with no evidence of pheochromocytoma or adenoma.     HEPATOBILIARY: Mild diffuse hepatic steatosis. Liver otherwise normal. Normal biliary tree. No normal gallbladder.     PANCREAS: Normal.     SPLEEN: Spleen is mildly enlarged at 14 cm.     KIDNEYS: Several benign renal cysts. No follow up is needed. Kidneys are otherwise normal.     BOWEL: Duodenal and jejunal diverticula. Normal appendix. Bowel otherwise unremarkable. No ascites.     LYMPH NODES: No lymphadenopathy. Bilateral para-aortic retroperitoneal foci of " metallic dephasing artifact.     VASCULATURE: Normal caliber abdominal aorta and patent mesenteric and renal arteries.       MUSCULOSKELETAL: A 10 x 7 x 5 cm thin-walled peripherally enhancing cystic structure in the subcutaneous fat posterior midline low back is of uncertain etiology.                                                                      IMPRESSION:   1.  A 1.4 cm benign myelolipoma in the right adrenal gland requires no follow-up.   2.  Adrenal glands are otherwise negative with no evidence of pheochromocytoma or adenoma.  3.  Bilateral para-aortic retroperitoneal foci of metallic dephasing artifact could relate to retroperitoneal lymph node dissection related to patient's history of testicular cancer (correlate with surgical history).  4.  Mild splenic enlargement.  5.  Duodenal and jejunal diverticula.  6.  Mild diffuse hepatic steatosis.  7.  A 10 x 7 x 5 cm thin-walled peripherally enhancing cystic structure in the subcutaneous fat posterior midline low back is of uncertain etiology.    Assessment/Plan:  Cyst and skin tag on the back.   Have these removed surgically.    Super Morbid Obesity.   He is being seen by the Weight Management Team with Dr. Olea, continue that care pathway.        Bobby Houston MD  [facility]  [phone]

## 2024-10-09 ENCOUNTER — OFFICE VISIT (OUTPATIENT)
Dept: SURGERY | Facility: CLINIC | Age: 64
End: 2024-10-09
Payer: COMMERCIAL

## 2024-10-09 VITALS
WEIGHT: 315 LBS | BODY MASS INDEX: 42.66 KG/M2 | HEIGHT: 72 IN | DIASTOLIC BLOOD PRESSURE: 78 MMHG | SYSTOLIC BLOOD PRESSURE: 124 MMHG

## 2024-10-09 DIAGNOSIS — R93.89 ABNORMAL MRI: ICD-10-CM

## 2024-10-09 DIAGNOSIS — L72.0 CYST OF SKIN AND SUBCUTANEOUS TISSUE: Primary | ICD-10-CM

## 2024-10-09 DIAGNOSIS — L91.8 SKIN TAG: ICD-10-CM

## 2024-10-09 PROCEDURE — 99204 OFFICE O/P NEW MOD 45 MIN: CPT | Performed by: SURGERY

## 2024-10-09 RX ORDER — ACETAMINOPHEN 325 MG/1
975 TABLET ORAL ONCE
OUTPATIENT
Start: 2024-10-09 | End: 2024-10-09

## 2024-10-09 NOTE — LETTER
10/9/2024      Stephen Vyas  [address]  [place] [address]      Dear Colleague,    Thank you for referring your patient, Stephen Vyas, to the [facility] [place]. Please see a copy of my visit note below.    GENERAL SURGICAL CONSULTATION    I was requested by Mesha Rapp to consult on this pt to evaluate them for a cyst on the back.    HPI:  This is a 63 year old male here today with an MRI that showed a cyst on his back.  He is also super morbidly obese and we discussed those issues as well.        Allergies:Codeine    Past Medical History:   Diagnosis Date     Essential hypertension      Testicle cancer (H)     age 23       Past Surgical History:   Procedure Laterality Date     RADICAL ORCHIECTOMY Right     age 23     TONSILLECTOMY, ADENOIDECTOMY, COMBINED Bilateral     When he was about 32       CURRENT MEDS:  Current Outpatient Medications   Medication Sig Dispense Refill     amLODIPine (NORVASC) 10 MG tablet Take 1 tablet (10 mg) by mouth daily 90 tablet 3     aspirin 81 MG EC tablet Take 81 mg by mouth daily       atorvastatin (LIPITOR) 80 MG tablet Take 1 tablet (80 mg) by mouth daily. 90 tablet 2     ezetimibe (ZETIA) 10 MG tablet Take 1 tablet (10 mg) by mouth daily 90 tablet 3     hydrALAZINE (APRESOLINE) 25 MG tablet Take 1 tablet (25 mg) by mouth every 8 hours 270 tablet 3     lisinopril (ZESTRIL) 20 MG tablet Take 1 tablet (20 mg) by mouth daily 90 tablet 3     spironolactone (ALDACTONE) 25 MG tablet Take 1 tablet (25 mg) by mouth 2 times daily 180 tablet 3       Family History   Problem Relation Age of Onset     Glasses (<7 y/o) Mother      Dementia Mother      Glasses (<7 y/o) Father      Heart Disease Father      Tuberculosis Maternal Grandfather      Heart Disease Paternal Grandmother      Cancer Paternal Grandfather      Family history is not pertinent to this patients Chief Complaint.     reports that he has never smoked. He has never been  "exposed to tobacco smoke. His smokeless tobacco use includes chew. He reports that he does not currently use alcohol. He reports that he does not currently use drugs.    Review of Systems -   10 point Review of systems is negative except for; as mentioned above in HPI and PMHx    Vitals: /78 (BP Location: Right arm)   Ht 1.82 m (5' 11.65\")   Wt (!) 174.6 kg (385 lb)   BMI 52.73 kg/m    BMI= Body mass index is 52.73 kg/m .     EXAM:  GENERAL: Super Morbidly Obese male  HEENT: EOMI, Anicteric Sclera, Moist Mucous Membranes,  In Mouth the pt does not have redness or bleeding gums  CARDIOVASCULAR: RRR w/out murmur   CHEST/LUNG: Clear to Auscultation  BACK:  Tennis ball sized cyst in the mid, Right back,  He also has a 1.5 cm skin tag on his R back.   ABDOMEN:  Non tender to palpation, +BS  MUSCULOSKELETAL:  No deformities with good range of motion in all extremities  NEURO: He is ambulatory with good strength in both legs.  HEME/LYMPH: No Cervical Adenopathy or tenderness.     IMAGES:  I evaluated these images myself and he has an MRI     EXAM: MR ADRENAL W/O and W CONTRAST  LOCATION: [facility]  DATE: 8/20/2024     INDICATION: Hypertension. Hypokalemia. Elevated serum metanephrines. History of right testicular cancer at age 23 status post orchiectomy.  COMPARISON: 8/20/2024 MRI lumbar spine  TECHNIQUE: Routine MRI adrenal protocol including T1 in/out phase, diffusion, multiplane T2, and if done with contrast dynamic T1 post contrast.  CONTRAST: 18 ml Gadavist     FINDINGS:     ADRENALS: A circumscribed 1.4 x 1.3 x 1.0 cm fat-containing lesion in the right adrenal gland consistent with a benign myelolipoma. Adrenal glands are otherwise negative with no evidence of pheochromocytoma or adenoma.     HEPATOBILIARY: Mild diffuse hepatic steatosis. Liver otherwise normal. Normal biliary tree. No normal gallbladder.     PANCREAS: Normal.     SPLEEN: Spleen is mildly enlarged at 14 cm.   "   KIDNEYS: Several benign renal cysts. No follow up is needed. Kidneys are otherwise normal.     BOWEL: Duodenal and jejunal diverticula. Normal appendix. Bowel otherwise unremarkable. No ascites.     LYMPH NODES: No lymphadenopathy. Bilateral para-aortic retroperitoneal foci of metallic dephasing artifact.     VASCULATURE: Normal caliber abdominal aorta and patent mesenteric and renal arteries.       MUSCULOSKELETAL: A 10 x 7 x 5 cm thin-walled peripherally enhancing cystic structure in the subcutaneous fat posterior midline low back is of uncertain etiology.                                                                      IMPRESSION:   1.  A 1.4 cm benign myelolipoma in the right adrenal gland requires no follow-up.   2.  Adrenal glands are otherwise negative with no evidence of pheochromocytoma or adenoma.  3.  Bilateral para-aortic retroperitoneal foci of metallic dephasing artifact could relate to retroperitoneal lymph node dissection related to patient's history of testicular cancer (correlate with surgical history).  4.  Mild splenic enlargement.  5.  Duodenal and jejunal diverticula.  6.  Mild diffuse hepatic steatosis.  7.  A 10 x 7 x 5 cm thin-walled peripherally enhancing cystic structure in the subcutaneous fat posterior midline low back is of uncertain etiology.    Assessment/Plan:  Cyst and skin tag on the back.   Have these removed surgically.    Super Morbid Obesity.   He is being seen by the Weight Management Team with Dr. Olea, continue that care pathway.        Bobby Houston MD  [facility]  [phone]       Again, thank you for allowing me to participate in the care of your patient.        Sincerely,        Bobby Houston MD

## 2024-10-17 ENCOUNTER — VIRTUAL VISIT (OUTPATIENT)
Dept: SLEEP MEDICINE | Facility: CLINIC | Age: 64
End: 2024-10-17
Attending: INTERNAL MEDICINE
Payer: COMMERCIAL

## 2024-10-17 VITALS — BODY MASS INDEX: 42.66 KG/M2 | HEIGHT: 72 IN | WEIGHT: 315 LBS

## 2024-10-17 DIAGNOSIS — G47.33 OSA (OBSTRUCTIVE SLEEP APNEA): Primary | ICD-10-CM

## 2024-10-17 PROCEDURE — 99203 OFFICE O/P NEW LOW 30 MIN: CPT | Mod: 95

## 2024-10-17 ASSESSMENT — SLEEP AND FATIGUE QUESTIONNAIRES
HOW LIKELY ARE YOU TO NOD OFF OR FALL ASLEEP WHILE SITTING INACTIVE IN A PUBLIC PLACE: SLIGHT CHANCE OF DOZING
HOW LIKELY ARE YOU TO NOD OFF OR FALL ASLEEP WHILE SITTING AND TALKING TO SOMEONE: SLIGHT CHANCE OF DOZING
HOW LIKELY ARE YOU TO NOD OFF OR FALL ASLEEP WHILE LYING DOWN TO REST IN THE AFTERNOON WHEN CIRCUMSTANCES PERMIT: HIGH CHANCE OF DOZING
HOW LIKELY ARE YOU TO NOD OFF OR FALL ASLEEP IN A CAR, WHILE STOPPED FOR A FEW MINUTES IN TRAFFIC: WOULD NEVER DOZE
HOW LIKELY ARE YOU TO NOD OFF OR FALL ASLEEP WHILE SITTING QUIETLY AFTER LUNCH WITHOUT ALCOHOL: MODERATE CHANCE OF DOZING
HOW LIKELY ARE YOU TO NOD OFF OR FALL ASLEEP WHEN YOU ARE A PASSENGER IN A CAR FOR AN HOUR WITHOUT A BREAK: SLIGHT CHANCE OF DOZING
HOW LIKELY ARE YOU TO NOD OFF OR FALL ASLEEP WHILE WATCHING TV: MODERATE CHANCE OF DOZING
HOW LIKELY ARE YOU TO NOD OFF OR FALL ASLEEP WHILE SITTING AND READING: SLIGHT CHANCE OF DOZING

## 2024-10-17 ASSESSMENT — PAIN SCALES - GENERAL: PAINLEVEL: MODERATE PAIN (5)

## 2024-10-17 NOTE — NURSING NOTE
Current patient location:  [address] [place]    Is the patient currently in the state of [place]? YES    Visit mode:VIDEO    If the visit is dropped, the patient can be reconnected by: VIDEO VISIT: Send to e-mail at: [email or web address]    Will anyone else be joining the visit? NO  (If patient encounters technical issues they should call [phone]    Are changes needed to the allergy or medication list? Pt stated no changes to allergies and Pt stated no med changes    Are refills needed on medications prescribed by this physician? NO    Rooming Documentation:  Questionnaire(s) completed    Reason for visit: Consult    [place] CAMACHO

## 2024-10-17 NOTE — PROGRESS NOTES
"Virtual Visit Details  Type of service: Video Visit   Start time: 11:06 AM  End time: 11:30 AM  Originating Location (pt. Location): Home  Distant Location (provider location): Off-site  Platform used for Video Visit: [facility]    Outpatient Sleep Medicine Consultation:      Name: Stephen Vyas MRN# [id number]   Age: 63 year old YOB: 1960     Date of Consultation: October 17, 2024  Consultation is requested by: Hamida Conte DO  [address] [place] [address] Hamida Conte  Primary care provider: Mesha Rapp       Reason for Sleep Consult:     Stephen Vyas is sent by Hamida Conte for a sleep consultation regarding previously diagnosed KAY.    Patient s Reason for visit  Stephen Vyas main reason for visit: referral from doctor following up other problems  Patient states problem(s) started: 1 year ago  Stephen Vyas's goals for this visit: No specific goals           Assessment and Plan:     Summary Sleep Diagnoses:  (G47.33) KAY (obstructive sleep apnea) (primary encounter diagnosis)  Comment: Stephen is a 63-year-old male who is sent to the sleep clinic to establish care for previously diagnosed KAY of unknown severity. Further assessment of his apnea was recommended as he was having significantly elevated blood pressures and some heart palpitations with near syncope episodes. Stephen has been using his CPAP machine every night since his diagnosis in the 90s. He orders supplies online. I do not have a CPAP download available today or copies of his sleep studies. He believes his machine is 8-10 years old, and he thinks he has a Respironics machine. He feels like his CPAP pressure is comfortable, and his wife is not reporting any snoring with his CPAP on. Stephen feels he sleeps pretty well. He describes himself as a \"night owl\" so he sometimes has trouble winding down at night. Once he falls asleep, he wakes 2 times per night to use the bathroom. On non work days, " he stays up later and sleeps in. Stephen does report occasional morning headaches in the occipital lobe. No restless legs or unusual nocturnal behavior.      Plan: Stephen believes he has copies of his previous sleep studies at home. He is going to let me know if he can find them. If not, informed Stephen he would need to complete a new sleep study to qualify for a new machine/supplies. I will compare his previous study to his current weight. I do have some concern for hypoventilation given his BMI of 51 and his report of occasional morning headaches.     Comorbid Diagnoses:  Morbid obesity, HTN, HLD    Summary Recommendations:  No orders of the defined types were placed in this encounter.    Summary Counseling:    Sleep Testing Reviewed  Obstructive Sleep Apnea Reviewed  Complications of Untreated Sleep Apnea Reviewed    Patient will bring his machine into the clinic for a manual download. Follow up in about 3 months if he can find his studies and get a new machine.   THANIA Landa CNP    Total time spent reviewing medical records, history and physical examination, review of previous testing and interpretation as well as documentation on this date: 41 minutes     CC: Hamida Conte DO          History of Present Illness:     Stephen is sent to the sleep clinic to establish care for previously diagnosed KAY. Further assessment of his apnea was recommended as he was having significantly elevated blood pressures and some heart palpitations. He uses CPAP every night. He orders supplies online. He believes his machine is 8-10 years old. He thinks he has a Respironics machine. He said this is his 3rd or 4th machine. He feels like his CPAP pressure is comfortable.        Past Sleep Evaluations: He has done multiple sleep studies. He reports doing a MWT and a repeat sleep study within the last 10 years. His original sleep study was in [place], [place] in the 90s. His more recent studies were in [place], [place].  He thinks he weighed less during his sleep studies.     Snoring: No, unless he is really congested.   Mask Leak: No  Type of Mask: nasal mask  Dry Nose or Mouth: He occasionally gets a dry mouth in the winter when he doesn't use his humidifier. In the summer he doesn't use his humidifier very often.   Condensation in hose or mask: No  Nasal/Skin irritation: No     SLEEP-WAKE SCHEDULE:     Work/School Days: Patient goes to school/work: Yes He used to work night shifts.    Usually gets into bed at 11 to 12pm  Takes patient about 40 minutes to an hour to fall asleep  Has trouble falling asleep Most nights nights per week  Wakes up in the middle of the night average 2 times.  Wakes up due to Pain;Use the bathroom bathroom x2  He has trouble falling back asleep almost never.  It usually takes a few minutes to get back to sleep  Patient is usually up at 7am to 9am  Uses alarm: Yes    Weekends/Non-work Days/All Other Days:  Usually gets into bed at sometimes as late as 4-5 AM. His mind can race, and he says he doesn't get sleepy.   Takes patient about   to fall asleep  Patient is usually up at 10 AM to 12 PM   Uses alarm: No    Sleep Need  Patient gets 5-6 hours of sleep on average   Patient thinks he needs about 6-7 hours of sleep    Stephen Vyas prefers to sleep in this position(s): back, sides   Patient states they do the following activities in bed:      Naps  Patient takes a purposeful nap times a week and naps are usually   in duration. If he does nap it will be around 3 PM, and he may fall asleep in his chair between 8-9 PM for 30-60 minutes.   He feels better after a nap:    He dozes off unintentionally while sitting in his chair.   Patient has had a driving accident or near-miss due to sleepiness/drowsiness: No    SLEEP DISRUPTIONS:    Breathing/Snoring  Patient snores: Yes, not with CPAP unless he is really congested.    Other people complain about his snoring: Not with CPAP  Patient has been told he stops  breathing in his sleep: Not with CPAP  He has issues with the following: Occasional headaches in the occipital lobe. Denies nocturnal heartburn/reflux.     Movement:  Patient gets pain, discomfort, with an urge to move: No. He denies restless legs. He did have sciatic pain a little while ago that was pretty severe at times.    It happens when he is resting:     It happens more at night:     Patient has been told he kicks his legs at night: No     Behaviors in Sleep:  Stephen Vyas has experienced the following behaviors while sleeping: He thinks he grinds his teeth.   He has experienced sudden muscle weakness during the day:    Pt denies sleep talking, sleep walking, and dream enactment behavior. Pt denies sleep paralysis, hypnagogue and cataplexy.    Is there anything else you would like your sleep provider to know:      CAFFEINE AND OTHER SUBSTANCES:    Patient consumes caffeinated beverages per day: He drinks a lot of coffee throughout the day.   Last caffeine use is usually: He drinks caffeine all day but tries to ease off in the evening.  List of any prescribed or over the counter stimulants that patient takes: None   List of any prescribed or over the counter sleep medication patient takes: None  List of previous sleep medications that patient has tried: None  Patient drinks alcohol to help them sleep: No  Patient drinks alcohol near bedtime: No    Family History:  Patient has a family member been diagnosed with a sleep disorder: No       Social History: He lives at home with his wife.      SCALES:    EPWORTH SLEEPINESS SCALE         10/17/2024    10:56 AM    [place] Sleepiness Scale ( MANISH Santoyo  [id number]<br>ESS - [place]/English - Final version - 21 Nov 07 - [facility] [place].)   Sitting and reading Slight chance of dozing   Watching TV Moderate chance of dozing   Sitting, inactive in a public place (e.g. a theatre or a meeting) Slight chance of dozing   As a passenger in a car for an hour without a  break Slight chance of dozing   Lying down to rest in the afternoon when circumstances permit High chance of dozing   Sitting and talking to someone Slight chance of dozing   Sitting quietly after a lunch without alcohol Moderate chance of dozing   In a car, while stopped for a few minutes in traffic Would never doze   Ward Score (MC) 11   Ward Score (Sleep) 11         INSOMNIA SEVERITY INDEX (CONRAD)          10/17/2024    10:39 AM   Insomnia Severity Index (CONRAD)   Difficulty falling asleep 2   Difficulty staying asleep 1   Problems waking up too early 0   How SATISFIED/DISSATISFIED are you with your CURRENT sleep pattern? 2   How NOTICEABLE to others do you think your sleep problem is in terms of impairing the quality of your life? 1   How WORRIED/DISTRESSED are you about your current sleep problem? 1   To what extent do you consider your sleep problem to INTERFERE with your daily functioning (e.g. daytime fatigue, mood, ability to function at work/daily chores, concentration, memory, mood, etc.) CURRENTLY? 2   CONRAD Total Score 9       Guidelines for Scoring/Interpretation:  Total score categories:  0-7 = No clinically significant insomnia   8-14 = Subthreshold insomnia   15-21 = Clinical insomnia (moderate severity)  22-28 = Clinical insomnia (severe)  Used via courtesy of www.[employer].va.gov with permission from Shay Tracy PhD., [facility]      STOP BANG         10/17/2024    10:57 AM   STOP BANG Questionnaire (  2008, the American Society of Anesthesiologists, Inc. [employer]   1. Snoring - Do you snore loudly (louder than talking or loud enough to be heard through closed doors)? Yes   2. Tired - Do you often feel tired, fatigued, or sleepy during daytime? Yes   3. Observed - Has anyone observed you stop breathing during your sleep? Yes   4. Blood pressure - Do you have or are you being treated for high blood pressure? Yes   5. BMI - BMI more than 35 kg/m2? Yes   6. Age -  "Age over 50 yr old? Yes   7. Neck circumference - Neck circumference greater than 40 cm? Yes   8. Gender - Gender male? Yes   STOP BANG Score (MC): 7 (High risk of KAY)         GAD7         No data to display                  CAGE-AID         No data to display                CAGE-AID reprinted with permission from the [place] Medical Journal, LAYLA Marino. and GERALDINE Howell, \"Conjoint screening questionnaires for alcohol and drug abuse\" [place] Medical Journal 94: 135-140, 1995.      PATIENT HEALTH QUESTIONNAIRE-9 (PHQ - 9)         No data to display                Developed by Remington Bhat, Greta De La Rosa, Donta Bryant and colleagues, with an educational alycia from [employer] No permission required to reproduce, translate, display or distribute.        Allergies:    Allergies   Allergen Reactions    Codeine      Other reaction(s): GI intolerance  Verified       Medications:    Current Outpatient Medications   Medication Sig Dispense Refill    amLODIPine (NORVASC) 10 MG tablet Take 1 tablet (10 mg) by mouth daily 90 tablet 3    aspirin 81 MG EC tablet Take 81 mg by mouth daily      atorvastatin (LIPITOR) 80 MG tablet Take 1 tablet (80 mg) by mouth daily. 90 tablet 2    ezetimibe (ZETIA) 10 MG tablet Take 1 tablet (10 mg) by mouth daily 90 tablet 3    hydrALAZINE (APRESOLINE) 25 MG tablet Take 1 tablet (25 mg) by mouth every 8 hours 270 tablet 3    lisinopril (ZESTRIL) 20 MG tablet Take 1 tablet (20 mg) by mouth daily 90 tablet 3    spironolactone (ALDACTONE) 25 MG tablet Take 1 tablet (25 mg) by mouth 2 times daily 180 tablet 3       Problem List:  Patient Active Problem List    Diagnosis Date Noted    Dizziness 07/06/2024     Priority: Medium    Pre-syncope 07/06/2024     Priority: Medium    Mixed hyperlipidemia 06/14/2024     Priority: Medium    Bilateral impacted cerumen 06/14/2024     Priority: Medium    Hypokalemia 11/07/2023     Priority: Medium    Lumbar radiculopathy 11/07/2023     Priority: " Medium    Pain in back 11/07/2023     Priority: Medium    Spondylosis of lumbar spine 11/07/2023     Priority: Medium    Presbyopia 10/24/2022     Priority: Medium    Class 3 severe obesity due to excess calories with body mass index (BMI) of 50.0 to 59.9 in adult, unspecified whether serious comorbidity present (H) 07/25/2019     Priority: Medium    Obstructive sleep apnea syndrome 11/05/2018     Priority: Medium    Status post orchiectomy 06/05/2014     Priority: Medium    Primary hypertension 11/10/2010     Priority: Medium     Formatting of this note might be different from the original. Unspecified Essential Hypertension      Lipids abnormal 11/10/2010     Priority: Medium        Past Medical/Surgical History:  Past Medical History:   Diagnosis Date    Essential hypertension     Testicle cancer (H)     age 23     Past Surgical History:   Procedure Laterality Date    RADICAL ORCHIECTOMY Right     age 23    TONSILLECTOMY, ADENOIDECTOMY, COMBINED Bilateral     When he was about 32       Social History:  Social History     Socioeconomic History    Marital status:      Spouse name: Not on file    Number of children: Not on file    Years of education: Not on file    Highest education level: Not on file   Occupational History    Not on file   Tobacco Use    Smoking status: Never     Passive exposure: Never    Smokeless tobacco: Current     Types: Chew    Tobacco comments:     Occ chew   Vaping Use    Vaping status: Never Used   Substance and Sexual Activity    Alcohol use: Not Currently    Drug use: Not Currently    Sexual activity: Yes   Other Topics Concern    Not on file   Social History Narrative    Not on file     Social Determinants of Health     Financial Resource Strain: Low Risk  (6/14/2024)    Financial Resource Strain     Within the past 12 months, have you or your family members you live with been unable to get utilities (heat, electricity) when it was really needed?: No   Food Insecurity: Low Risk   (6/14/2024)    Food Insecurity     Within the past 12 months, did you worry that your food would run out before you got money to buy more?: No     Within the past 12 months, did the food you bought just not last and you didn t have money to get more?: No   Transportation Needs: Low Risk  (6/14/2024)    Transportation Needs     Within the past 12 months, has lack of transportation kept you from medical appointments, getting your medicines, non-medical meetings or appointments, work, or from getting things that you need?: No   Physical Activity: Inactive (6/14/2024)    Exercise Vital Sign     Days of Exercise per Week: 0 days     Minutes of Exercise per Session: 0 min   Stress: No Stress Concern Present (6/14/2024)    [place] [employer] - Occupational Stress Questionnaire     Feeling of Stress : Only a little   Social Connections: Unknown (6/14/2024)    Social Connection and Isolation Panel [NHANES]     Frequency of Communication with Friends and Family: Not on file     Frequency of Social Gatherings with Friends and Family: Three times a week     Attends [employer] Services: Not on file     Active Member of Clubs or Organizations: Not on file     Attends Club or Organization Meetings: Not on file     Marital Status: Not on file   Interpersonal Safety: Low Risk  (6/14/2024)    Interpersonal Safety     Do you feel physically and emotionally safe where you currently live?: Yes     Within the past 12 months, have you been hit, slapped, kicked or otherwise physically hurt by someone?: No     Within the past 12 months, have you been humiliated or emotionally abused in other ways by your partner or ex-partner?: No   Housing Stability: Low Risk  (6/14/2024)    Housing Stability     Do you have housing? : Yes     Are you worried about losing your housing?: No       Family History:  Family History   Problem Relation Age of Onset    Glasses (<9 y/o) Mother     Dementia Mother     Glasses (<9 y/o) Father      "Heart Disease Father     Tuberculosis Maternal Grandfather     Heart Disease Paternal Grandmother     Cancer Paternal Grandfather        Review of Systems:  A complete review of systems reviewed by me is negative with the exeption of what has been mentioned in the history of present illness.    Physical Examination:  Vitals: Ht 1.829 m (6')   Wt (!) 172.4 kg (380 lb)   BMI 51.54 kg/m    BMI= Body mass index is 51.54 kg/m .    GENERAL APPEARANCE: healthy, alert, no distress, cooperative, and obese  EYES: Eyes grossly normal to inspection  NECK: no asymmetry, masses, or scars  RESP: no increased work of breathing noted, no audible cough or wheeze   NEURO: mentation intact and speech normal  PSYCH: mentation appears normal and affect normal/bright  Mallampati Class: Not visualized over video.  Tonsillar Stage: Not visualized over video.         Data: All pertinent previous laboratory data reviewed     Recent Labs   Lab Test 07/17/24  1232 07/09/24  0456    141   POTASSIUM 4.6 3.3*   CHLORIDE 104 101   CO2 30* 30*   ANIONGAP 8 10   * 107*   BUN 18.8 20.6   CR 1.41* 1.20*   MISSAEL 9.0 8.6*       Recent Labs   Lab Test 07/07/24  0338   WBC 8.7   RBC 4.59   HGB 13.1*   HCT 38.9*   MCV 85   MCH 28.5   MCHC 33.7   RDW 14.1          Recent Labs   Lab Test 07/07/24  0338   PROTTOTAL 6.7   ALBUMIN 3.7   BILITOTAL 0.4   ALKPHOS 80   AST 33   ALT 38       TSH (uIU/mL)   Date Value   07/07/2024 1.03       No results found for: \"UAMP\", \"UBARB\", \"BENZODIAZEUR\", \"UCANN\", \"UCOC\", \"OPIT\", \"UPCP\"    No results found for: \"IRONSAT\", \"[id number]\", \"DUKE\"    No results found for: \"PH\", \"PHARTERIAL\", \"PO2\", \"CY3XWFBBZRD\", \"SAT\", \"PCO2\", \"HCO3\", \"BASEEXCESS\", \"ANTOINE\", \"BEB\"    @LABRCNTIPR(phv:4,pco2v:4,po2v:4,hco3v:4,jaswant:4,o2per:4)@    Echocardiology: No results found for this or any previous visit (from the past 4320 hour(s)).    Chest x-ray:   No results found for this or any previous visit from the past 365 days.      Chest " CT:   No results found for this or any previous visit from the past 365 days.      PFT: Most Recent [facility] Pulmonary Function Testing    THANIA Landa CNP 10/17/2024

## 2024-10-23 ENCOUNTER — PATIENT OUTREACH (OUTPATIENT)
Dept: CARE COORDINATION | Facility: CLINIC | Age: 64
End: 2024-10-23
Payer: COMMERCIAL

## 2024-11-06 NOTE — CONFIDENTIAL NOTE
RECORDS RECEIVED FROM: internal    DATE RECEIVED: 11.26.24    NOTES (FOR ALL VISITS) STATUS DETAILS   OFFICE NOTES from referring provider internal    Mesha Rapp APRN CNP      MEDICATION LIST internal     IMAGING      XR (Chest) Ce  [facility]- 2.9.24    LABS     DIABETES: HBGA1C, CREATININE, FASTING LIPIDS, MICROALBUMIN URINE, POTASSIUM, TSH, T4    THYROID: TSH, T4, CBC, THYRODLONULIN, TOTAL T3, FREE T4, CALCITONIN, CEA internal /ce  Bmp- 7/17/24  Potassium- 7/8/24  Glucose- 7/7/24  TSH/T4- 7/7/24  Cortisol- 7/7/24  CBC- 7/7/24  CMP- 7/7/24  BMP- 7.6.24   Lipid- 6.14.24

## 2024-11-21 NOTE — CONFIDENTIAL NOTE
RECORDS RECEIVED FROM: internal    DATE RECEIVED: 11.26.24    NOTES (FOR ALL VISITS) STATUS DETAILS   OFFICE NOTES from referring provider internal    Mesha Rapp APRN CNP      MEDICATION LIST internal     IMAGING      XR (Chest) ce [facility]- 2.9.24    LABS     DIABETES: HBGA1C, CREATININE, FASTING LIPIDS, MICROALBUMIN URINE, POTASSIUM, TSH, T4    THYROID: TSH, T4, CBC, THYRODLONULIN, TOTAL T3, FREE T4, CALCITONIN, CEA internal  Bmp- 7/17/24  Pottassium- 7/8/24   Glucose meter- 7/7/24  TSH-T4- 7/7/24   Cortisol- 7/7/24  Cbc- 7/7/24  Lipid- 6.14.24

## 2024-11-26 ENCOUNTER — VIRTUAL VISIT (OUTPATIENT)
Dept: ENDOCRINOLOGY | Facility: CLINIC | Age: 64
End: 2024-11-26
Payer: COMMERCIAL

## 2024-11-26 ENCOUNTER — PRE VISIT (OUTPATIENT)
Dept: ENDOCRINOLOGY | Facility: CLINIC | Age: 64
End: 2024-11-26

## 2024-11-26 DIAGNOSIS — R89.9 ABNORMAL LABORATORY TEST RESULT: ICD-10-CM

## 2024-11-26 DIAGNOSIS — I1A.0 RESISTANT HYPERTENSION: Primary | ICD-10-CM

## 2024-11-26 DIAGNOSIS — I10 PRIMARY HYPERTENSION: ICD-10-CM

## 2024-11-26 PROCEDURE — 99417 PROLNG OP E/M EACH 15 MIN: CPT | Performed by: STUDENT IN AN ORGANIZED HEALTH CARE EDUCATION/TRAINING PROGRAM

## 2024-11-26 PROCEDURE — G2211 COMPLEX E/M VISIT ADD ON: HCPCS | Mod: 95 | Performed by: STUDENT IN AN ORGANIZED HEALTH CARE EDUCATION/TRAINING PROGRAM

## 2024-11-26 PROCEDURE — 99205 OFFICE O/P NEW HI 60 MIN: CPT | Mod: 95 | Performed by: STUDENT IN AN ORGANIZED HEALTH CARE EDUCATION/TRAINING PROGRAM

## 2024-11-26 RX ORDER — SPIRONOLACTONE 50 MG/1
50 TABLET, FILM COATED ORAL
Qty: 180 TABLET | Refills: 3 | Status: SHIPPED | OUTPATIENT
Start: 2024-11-26

## 2024-11-26 RX ORDER — LISINOPRIL 10 MG/1
10 TABLET ORAL DAILY
Qty: 90 TABLET | Refills: 3 | Status: SHIPPED | OUTPATIENT
Start: 2024-11-26

## 2024-11-26 NOTE — LETTER
11/26/2024       RE: Stephen Vyas  [address]  [facility] [address]     Dear Colleague,    Thank you for referring your patient, Stephen Vyas, to the [facility] ENDOCRINOLOGY CLINIC [place] at [facility]. Please see a copy of my visit note below.    Endocrinology Clinic Visit 11/26/2024      Video-Visit Details    Type of service:  Video Visit    Video Start Time (time video started): 10:17 AM    Video End Time (time video stopped): 10:50 AM    Originating Location (pt. Location): Other work        Distant Location (provider location):  Off-site    Mode of Communication:  Video Conference via [facility]    Physician has received verbal consent for a Video Visit from the patient? Yes    I spent a total of 114 minutes on the date of encounter reviewing medical records, evaluating the patient, coordinating care and documenting in the EHR, as detailed above.  The longitudinal plan of care for the diagnosis(es)/condition(s) as documented were addressed during this visit. Due to the added complexity in care, I will continue to support Stephen in the subsequent management and with ongoing continuity of care.      NAME:  Stephen Vyas  PCP:  Mesha Rapp  MRN:  [id number]  Reason for Consult: Abnormal lab results  Requesting Provider:  Mesha Rapp    Chief Complaint     Chief Complaint   Patient presents with     Video Visit     RECHECK       History of Present Illness     Stephen Vyas is a 64 year old male who is seen in video visit for elevated norepinephrine.  Has background history of hypertension, hypokalemia, LVH class III obesity, KAY, right testicular cancer at gae of 23 s/p right orchiectomy dizziness and presyncope today his first visit to the endocrinology clinic for assessment for elevated norepinephrine.    Has history of hypertensive urgency with hypokalemia.  He was diagnosed with hypertension>10 years was always being control until  recently   Was initially started on amlodipine and the lisinopril was added followed by atenolol, spironolactone was added on 7/10/2023 then switched from atenolol to metoprolol succinate in February 2024.  He was hospitalized 7/6/2024 - 7/9/2024 following an episode of lightheadedness was found to have severe hypokalemia and uncontrolled hypertension echo during the admission showed EF of 55-60% with LVH had ultrasound renal with duplex: Limited imaging of renal arteries due to bowel gas/habitus no evidence of significant renal artery stenosis was discharged on the following medications:  Started hydralazine 25 mg every 8 hours.  Continued lisinopril 20 mg daily.  KCl 20 mill equivalent daily  Continued on amlodipine 10 mg daily  Continued on spironolactone 25 mg twice daily      Workup:  3/16/2020: Renin activity 0.6, aldosterone 4.6, potassium 3.3  test was done at 2:47 PM  7/7/2023: Aldosterone 13.7 test was done at 12:27 PM.  2/8/2024: TSH normal 1.98  7/7/2024: Potassium 3.1, dopamine 194, epinephrine<55, norepinephrine elevated 6415 (4800), aldosterone 15.1, renin activity 0.9, random cortisol at 12:30 PM was 8.3, TSH 1.03.    Renal ultrasound with duplex on 7/7/2024:  IMPRESSION:   1.  Limited imaging of the renal arteries due to bowel gas/habitus. No evidence of significant renal artery stenosis.    MRI adrenal with and without contrast on 8/20/2024:  ADRENALS: A circumscribed 1.4 x 1.3 x 1.0 cm fat-containing lesion in the right adrenal gland consistent with a benign myelolipoma. Adrenal glands are otherwise negative with no evidence of pheochromocytoma or adenoma     On assessment today:  He stated he takes amlodipine 10 mg daily, hydralazine 25 mg every 8 hours, lisinopril 20 mg daily, spironolactone 25 mg twice daily. Medications well tolerated no SE.   Blood pressure at home: from what he recall 140/98 does not check regularly did not have readings today for the visit.   Most recent potassium results  4.6 on 7/17/2024.  He stopped taking potassium supplements since August 2024   No Headaches, no sweating, no palpitation,no sudden increase in the blood pressure. But stated in the past he had three episodes of palpitation sweating with increased the blood pressure but since the hospitalization and correcting his potassium level and adjusting the blood pressure medications no episodes.;  Illicit drugs use: no     Family history: Father: heart disease MI at age of 40 years HTN , Mother: Dementia , Brother: HTN , Brother: Colon cancer    Problem List     Patient Active Problem List   Diagnosis     Primary hypertension     Presbyopia     Hypokalemia     Lipids abnormal     Lumbar radiculopathy     Class 3 severe obesity due to excess calories with body mass index (BMI) of 50.0 to 59.9 in adult, unspecified whether serious comorbidity present (H)     Obstructive sleep apnea syndrome     Pain in back     Spondylosis of lumbar spine     Status post orchiectomy     Mixed hyperlipidemia     Bilateral impacted cerumen     Dizziness     Pre-syncope        Medications     Current Outpatient Medications   Medication Sig Dispense Refill     lisinopril (ZESTRIL) 10 MG tablet Take 1 tablet (10 mg) by mouth daily. 90 tablet 3     spironolactone (ALDACTONE) 50 MG tablet Take 1 tablet (50 mg) by mouth 2 times daily. 180 tablet 3     amLODIPine (NORVASC) 10 MG tablet Take 1 tablet (10 mg) by mouth daily 90 tablet 3     aspirin 81 MG EC tablet Take 81 mg by mouth daily       atorvastatin (LIPITOR) 80 MG tablet Take 1 tablet (80 mg) by mouth daily. 90 tablet 2     ezetimibe (ZETIA) 10 MG tablet Take 1 tablet (10 mg) by mouth daily 90 tablet 3     hydrALAZINE (APRESOLINE) 25 MG tablet Take 1 tablet (25 mg) by mouth every 8 hours 270 tablet 3     No current facility-administered medications for this visit.        Allergies     Allergies   Allergen Reactions     Codeine      Other reaction(s): GI intolerance  Verified       Medical /  Surgical History     Past Medical History:   Diagnosis Date     Essential hypertension      Testicle cancer (H)     age 23     Past Surgical History:   Procedure Laterality Date     RADICAL ORCHIECTOMY Right     age 23     TONSILLECTOMY, ADENOIDECTOMY, COMBINED Bilateral     When he was about 32       Social History     Social History     Socioeconomic History     Marital status:      Spouse name: Not on file     Number of children: Not on file     Years of education: Not on file     Highest education level: Not on file   Occupational History     Not on file   Tobacco Use     Smoking status: Never     Passive exposure: Never     Smokeless tobacco: Current     Types: Chew     Tobacco comments:     Occ chew   Vaping Use     Vaping status: Never Used   Substance and Sexual Activity     Alcohol use: Not Currently     Drug use: Not Currently     Sexual activity: Yes   Other Topics Concern     Not on file   Social History Narrative     Not on file     Social Drivers of Health     Financial Resource Strain: Low Risk  (6/14/2024)    Financial Resource Strain      Within the past 12 months, have you or your family members you live with been unable to get utilities (heat, electricity) when it was really needed?: No   Food Insecurity: Low Risk  (6/14/2024)    Food Insecurity      Within the past 12 months, did you worry that your food would run out before you got money to buy more?: No      Within the past 12 months, did the food you bought just not last and you didn t have money to get more?: No   Transportation Needs: Low Risk  (6/14/2024)    Transportation Needs      Within the past 12 months, has lack of transportation kept you from medical appointments, getting your medicines, non-medical meetings or appointments, work, or from getting things that you need?: No   Physical Activity: Inactive (6/14/2024)    Exercise Vital Sign      Days of Exercise per Week: 0 days      Minutes of Exercise per Session: 0 min    Stress: No Stress Concern Present (6/14/2024)    [employer] [place] [place] [employer] - Occupational Stress Questionnaire      Feeling of Stress : Only a little   Social Connections: Unknown (6/14/2024)    Social Connection and Isolation Panel [NHANES]      Frequency of Communication with Friends and Family: Not on file      Frequency of Social Gatherings with Friends and Family: Three times a week      Attends Anabaptist Services: Not on file      Active Member of Clubs or Organizations: Not on file      Attends Club or Organization Meetings: Not on file      Marital Status: Not on file   Interpersonal Safety: Low Risk  (6/14/2024)    Interpersonal Safety      Do you feel physically and emotionally safe where you currently live?: Yes      Within the past 12 months, have you been hit, slapped, kicked or otherwise physically hurt by someone?: No      Within the past 12 months, have you been humiliated or emotionally abused in other ways by your partner or ex-partner?: No   Housing Stability: Low Risk  (6/14/2024)    Housing Stability      Do you have housing? : Yes      Are you worried about losing your housing?: No       Family History     Family History   Problem Relation Age of Onset     Glasses (<9 y/o) Mother      Dementia Mother      Glasses (<9 y/o) Father      Heart Disease Father      Tuberculosis Maternal Grandfather      Heart Disease Paternal Grandmother      Cancer Paternal Grandfather        ROS     12 ROS completed, pertinent positive and negative in HPI    Physical Exam   There were no vitals taken for this visit.   GENERAL: alert and no distress  EYES: Eyes grossly normal to inspection.  No discharge or erythema, or obvious scleral/conjunctival abnormalities.  RESP: No audible wheeze, cough, or visible cyanosis.    SKIN: Visible skin clear. No significant rash, abnormal pigmentation or lesions.  NEURO: Cranial nerves grossly intact.  Mentation and speech appropriate for age.  PSYCH: Appropriate  affect, tone, and pace of words     Labs/Imaging     Pertinent Labs were reviewed and discussed briefly.  Radiology Results were  reviewed and discussed briefly.    Summary of recent findings:   Lab Results   Component Value Date    A1C 5.6 06/14/2024    A1C 5.6 09/28/2022       TSH   Date Value Ref Range Status   07/07/2024 1.03 0.30 - 4.20 uIU/mL Final       Creatinine   Date Value Ref Range Status   07/17/2024 1.41 (H) 0.67 - 1.17 mg/dL Final        Latest Ref Rn 7/7/2023  12:30 PM 6/14/2024  12:28 PM 7/6/2024  7:34 PM 7/7/2024  3:38 AM 7/7/2024  12:33 PM 7/7/2024  5:46 PM   ENDO ADRENAL LABS          Aldosterone 0.0 - 31.0 ng/dL     15.1     Cortisol Serum   ug/dL     8.3     Creatinine 0.67 - 1.17 mg/dL  1.18 (H)  1.20 (H)  1.19 (H)      Potassium Urine mmol/L mmol/L 32.8         Potassium 3.4 - 5.3 mmol/L  4.0  3.0 (L)  2.9 (L)  3.1 (L)  3.2 (L)    Potassium     2.9 (L)      Renin Activity ng/mL/hr     0.9     Sodium 135 - 145 mmol/L  139  142  141         Latest Ref [facility] 7/8/2024  12:02 AM 7/8/2024  5:18 AM 7/9/2024  4:56 AM 7/17/2024  12:32 PM   ENDO ADRENAL LABS        Aldosterone 0.0 - 31.0 ng/dL       Cortisol Serum   ug/dL       Creatinine 0.67 - 1.17 mg/dL   1.20 (H)  1.41 (H)    Potassium Urine mmol/L mmol/L       Potassium 3.4 - 5.3 mmol/L 3.3 (L)  3.6  3.3 (L)  4.6    Renin Activity ng/mL/hr       Sodium 135 - 145 mmol/L   141  142       Legend:  (H) High  (L) Low    Dopamine (pmol/L)  <=240 pmol/L 194    Comment: INTERPRETIVE INFORMATION: Dopamine    Seated (15 min) less than or equal to 240 pmol/L  Supine (30 min) less than or equal to 240 pmol/L    Epinephrine (pmol/L)  <=330 pmol/L <55   Comment: INTERPRETIVE INFORMATION:Epinephrine    Seated (15 min) less than or equal to 330 pmol/L  Supine (30 min) less than or equal to 265 pmol/L    Norepinephrine (pmol/L)  1050 - 4800 pmol/L 6415 High    Comment: INTERPRETIVE INFORMATION: Norepinephrine    Seated (15 min) 1050 - 4800 pmol/L  Supine (30 min)  680 - 3100 pmol/L     Narrative & Impression   EXAM:  1. RENAL ULTRASOUND   2. RENAL DUPLEX  LOCATION: [facility]  DATE: 7/7/2024     INDICATION: uncontrolled HTN; eval. for renal artery stenosis.    COMPARISON: None.  TECHNIQUE: Duplex imaging is performed utilizing gray-scale, two-dimensional images, and color-flow imaging. Doppler waveform analysis and spectral Doppler imaging is also performed.     FINDINGS:      RIGHT KIDNEY: 10 x 6.9 x 7.4 cm. Normal without hydronephrosis or masses.     LEFT KIDNEY 11.9 x 5.7 x 6.5 cm. Normal without hydronephrosis or masses..      BLADDER: Normal.     RENAL DUPLEX: Limited views of the renal arteries due to bowel gas/habitus.  Aortic PSV: 129 cm/s, multiphasic  Right Renal Artery PSV: 96 cm/s (Normal considered less than 200 cm/s.)  Right Intrarenal Resistive Index: Borderline, 0.7 to 0.8  Left Renal Artery PSV 94 cm/s (Normal considered less than 200 cm/s.)  Left Intrarenal Resistive Index: Borderline, 0.7 to 0.8                                                                      IMPRESSION:   1.  Limited imaging of the renal arteries due to bowel gas/habitus. No evidence of significant renal artery stenosis.     EXAM: MR ADRENAL W/O and W CONTRAST  LOCATION: [facility]  DATE: 8/20/2024     INDICATION: Hypertension. Hypokalemia. Elevated serum metanephrines. History of right testicular cancer at age 23 status post orchiectomy.  COMPARISON: 8/20/2024 MRI lumbar spine  TECHNIQUE: Routine MRI adrenal protocol including T1 in/out phase, diffusion, multiplane T2, and if done with contrast dynamic T1 post contrast.  CONTRAST: 18 ml Gadavist     FINDINGS:     ADRENALS: A circumscribed 1.4 x 1.3 x 1.0 cm fat-containing lesion in the right adrenal gland consistent with a benign myelolipoma. Adrenal glands are otherwise negative with no evidence of pheochromocytoma or adenoma.     HEPATOBILIARY: Mild diffuse hepatic steatosis. Liver  otherwise normal. Normal biliary tree. No normal gallbladder.     PANCREAS: Normal.     SPLEEN: Spleen is mildly enlarged at 14 cm.     KIDNEYS: Several benign renal cysts. No follow up is needed. Kidneys are otherwise normal.     BOWEL: Duodenal and jejunal diverticula. Normal appendix. Bowel otherwise unremarkable. No ascites.     LYMPH NODES: No lymphadenopathy. Bilateral para-aortic retroperitoneal foci of metallic dephasing artifact.     VASCULATURE: Normal caliber abdominal aorta and patent mesenteric and renal arteries.       MUSCULOSKELETAL: A 10 x 7 x 5 cm thin-walled peripherally enhancing cystic structure in the subcutaneous fat posterior midline low back is of uncertain etiology.                                                                      IMPRESSION:   1.  A 1.4 cm benign myelolipoma in the right adrenal gland requires no follow-up.   2.  Adrenal glands are otherwise negative with no evidence of pheochromocytoma or adenoma.  3.  Bilateral para-aortic retroperitoneal foci of metallic dephasing artifact could relate to retroperitoneal lymph node dissection related to patient's history of testicular cancer (correlate with surgical history).  4.  Mild splenic enlargement.  5.  Duodenal and jejunal diverticula.  6.  Mild diffuse hepatic steatosis.  7.  A 10 x 7 x 5 cm thin-walled peripherally enhancing cystic structure in the subcutaneous fat posterior midline low back is of uncertain etiology.       I personally reviewed the patient's outside records from [facility] EMR, Care Everywhere, and faxed records. Summary of pertinent findings in HPI.    Impression / Plan     1.  Resistant hypertension:  2.  Primary hyperaldosteronism:  History of hypertension for over 10 years.  Initially was well-controlled however over the last 6 months started to get episodes of very high blood pressure.  History of hypokalemia.  While on spironolactone 25 mg twice daily lab test showed potassium level of 3.1 renin activity  of 0.9 aldosterone level of 15.1.  He was also found to have elevated plasma norepinephrine during the hospitalization in July 2024 level was 6415(4800) epinephrine was low undetectable, dopamine was normal 194.  No check of metanephrine or normetanephrine  MRI adrenal showed 1.4 cm right adrenal myolipoma.  Left adrenal normal.  No evidence of pheochromocytoma or adenoma.    Impression: Resistant hypertension likely due to primary hyperaldosteronism.  No suspicion for pheochromocytoma given MRI findings.  Functional paraganglioma is also less likely.  Likely false elevation in norepinephrine due to sickness during the hospitalization will repeat assessment for catecholamines/metanephrines.    I explained to him today most probably he has primary hyperaldosteronism and options of the treatment of primary hyperaldosteronism which includes adrenalectomy for unilateral disease which is the superior option given the long-term benefit in reducing the complications in the future including cardiovascular complications and renal complications and discussed with the second option which is the medical treatment with aldosterone receptor antagonist he opted to follow the medical treatment.  Given his blood pressure currently is not well-controlled with his current regimen and the need of increasing his spironolactone we will hold on proceeding confirmatory testing and we will hold on getting the confirmatory testing for now however if he decides in the future he would like to proceed with surgical treatment we will be in need to proceed with the confirmatory testing prior to the next steps.    Plan:  -24-hour urine collection for catecholamines/metanephrines.  -Plasma metanephrine/catecholamines  -To increase spironolactone dose to 50 mg twice daily and to reduce lisinopril dose down to 10 mg daily.  -To get BMP and renin level check after 2 weeks.  -To start monitoring the blood pressure at home on daily basis and to send us  the records of his blood pressure readings after 2 weeks.  -Goals for spironolactone adjustments to achieve good blood pressure control, resolution of hypokalemia without using of potassium supplements.        Test and/or medications prescribed today:  Orders Placed This Encounter   Procedures     Catecholamines fractioned free urine     Metanephrine random or 24 hr urine     Metanephrines Plasma Free     Catecholamines Fractionated     Basic metabolic panel     Renin activity     Basic metabolic panel     Renin activity         Follow up: 3 months with a BMP prior to the visit    JESSE Parada  Endocrinology, Diabetes and Metabolism  [facility]    Note: Chart documentation done in part with Dragon Voice Recognition software. Although reviewed after completion, some word and grammatical errors may remain.  Please consider this when interpreting information in this chart        Again, thank you for allowing me to participate in the care of your patient.      Sincerely,    JESSE Parada

## 2024-11-26 NOTE — NURSING NOTE
Current patient location:  [place]    Is the patient currently in the state of [place]? YES    Visit mode:VIDEO    If the visit is dropped, the patient can be reconnected by:VIDEO VISIT: Text to cell phone:   Telephone Information:   Mobile [phone]       Will anyone else be joining the visit? NO  (If patient encounters technical issues they should call [phone])    Are changes needed to the allergy or medication list? No    Are refills needed on medications prescribed by this physician? NO    Rooming Documentation:  Questionnaire(s) completed    Reason for visit: Video Visit and RECHECK    Kisha CAMACHO

## 2024-11-26 NOTE — PATIENT INSTRUCTIONS
"- To increase spironolactone to 50 mg twice daily   - Top reduce your lisinopril to 10 mg daily   - Continue the rest of the medications   - After applying this change to get the blood work done in 2 weeks   - To check your bloood pressure daily   - To keep records of the blood pressure readings and to send it to me after two weeks through [employer].   - To do 24 hour urine collections:   Brief description of what you need to do:  Do the test on a day off, so you can stay home.  Wake up and flush the first urine.  Then collect all the urine for that day, evening and overnight if you wake to urinate. Plus the first urine of the next morning.    For women,  the lab should also give you a \"hat\" which is a device to help collect the urine and to pour it into the collection jug.   The urine must be kept cool, not frozen.  So, do not put it out on the porch.  Keep it in the fridge.   - I will see you back in 3 months with labs prior to the visit    "

## 2024-11-26 NOTE — PROGRESS NOTES
Endocrinology Clinic Visit 11/26/2024      Video-Visit Details    Type of service:  Video Visit    Video Start Time (time video started): 10:17 AM    Video End Time (time video stopped): 10:50 AM    Originating Location (pt. Location): Other work        Distant Location (provider location):  Off-site    Mode of Communication:  Video Conference via [employer]    Physician has received verbal consent for a Video Visit from the patient? Yes    I spent a total of 114 minutes on the date of encounter reviewing medical records, evaluating the patient, coordinating care and documenting in the EHR, as detailed above.  The longitudinal plan of care for the diagnosis(es)/condition(s) as documented were addressed during this visit. Due to the added complexity in care, I will continue to support Stephen in the subsequent management and with ongoing continuity of care.      NAME:  Stephen Vyas  PCP:  Mesha Rapp  MRN:  [id number]  Reason for Consult: Abnormal lab results  Requesting Provider:  Mesha Rapp    Chief Complaint     Chief Complaint   Patient presents with    Video Visit    RECHECK       History of Present Illness     Stephen Vyas is a 64 year old male who is seen in video visit for elevated norepinephrine.  Has background history of hypertension, hypokalemia, LVH class III obesity, KAY, right testicular cancer at gae of 23 s/p right orchiectomy dizziness and presyncope today his first visit to the endocrinology clinic for assessment for elevated norepinephrine.    Has history of hypertensive urgency with hypokalemia.  He was diagnosed with hypertension>10 years was always being control until recently   Was initially started on amlodipine and the lisinopril was added followed by atenolol, spironolactone was added on 7/10/2023 then switched from atenolol to metoprolol succinate in February 2024.  He was hospitalized 7/6/2024 - 7/9/2024 following an episode of lightheadedness was found to have severe  hypokalemia and uncontrolled hypertension echo during the admission showed EF of 55-60% with LVH had ultrasound renal with duplex: Limited imaging of renal arteries due to bowel gas/habitus no evidence of significant renal artery stenosis was discharged on the following medications:  Started hydralazine 25 mg every 8 hours.  Continued lisinopril 20 mg daily.  KCl 20 mill equivalent daily  Continued on amlodipine 10 mg daily  Continued on spironolactone 25 mg twice daily      Workup:  3/16/2020: Renin activity 0.6, aldosterone 4.6, potassium 3.3  test was done at 2:47 PM  7/7/2023: Aldosterone 13.7 test was done at 12:27 PM.  2/8/2024: TSH normal 1.98  7/7/2024: Potassium 3.1, dopamine 194, epinephrine<55, norepinephrine elevated 6415 (4800), aldosterone 15.1, renin activity 0.9, random cortisol at 12:30 PM was 8.3, TSH 1.03.    Renal ultrasound with duplex on 7/7/2024:  IMPRESSION:   1.  Limited imaging of the renal arteries due to bowel gas/habitus. No evidence of significant renal artery stenosis.    MRI adrenal with and without contrast on 8/20/2024:  ADRENALS: A circumscribed 1.4 x 1.3 x 1.0 cm fat-containing lesion in the right adrenal gland consistent with a benign myelolipoma. Adrenal glands are otherwise negative with no evidence of pheochromocytoma or adenoma     On assessment today:  He stated he takes amlodipine 10 mg daily, hydralazine 25 mg every 8 hours, lisinopril 20 mg daily, spironolactone 25 mg twice daily. Medications well tolerated no SE.   Blood pressure at home: from what he recall 140/98 does not check regularly did not have readings today for the visit.   Most recent potassium results 4.6 on 7/17/2024.  He stopped taking potassium supplements since August 2024   No Headaches, no sweating, no palpitation,no sudden increase in the blood pressure. But stated in the past he had three episodes of palpitation sweating with increased the blood pressure but since the hospitalization and correcting  his potassium level and adjusting the blood pressure medications no episodes.;  Illicit drugs use: no     Family history: Father: heart disease MI at age of 40 years HTN , Mother: Dementia , Brother: HTN , Brother: Colon cancer    Problem List     Patient Active Problem List   Diagnosis    Primary hypertension    Presbyopia    Hypokalemia    Lipids abnormal    Lumbar radiculopathy    Class 3 severe obesity due to excess calories with body mass index (BMI) of 50.0 to 59.9 in adult, unspecified whether serious comorbidity present (H)    Obstructive sleep apnea syndrome    Pain in back    Spondylosis of lumbar spine    Status post orchiectomy    Mixed hyperlipidemia    Bilateral impacted cerumen    Dizziness    Pre-syncope        Medications     Current Outpatient Medications   Medication Sig Dispense Refill    lisinopril (ZESTRIL) 10 MG tablet Take 1 tablet (10 mg) by mouth daily. 90 tablet 3    spironolactone (ALDACTONE) 50 MG tablet Take 1 tablet (50 mg) by mouth 2 times daily. 180 tablet 3    amLODIPine (NORVASC) 10 MG tablet Take 1 tablet (10 mg) by mouth daily 90 tablet 3    aspirin 81 MG EC tablet Take 81 mg by mouth daily      atorvastatin (LIPITOR) 80 MG tablet Take 1 tablet (80 mg) by mouth daily. 90 tablet 2    ezetimibe (ZETIA) 10 MG tablet Take 1 tablet (10 mg) by mouth daily 90 tablet 3    hydrALAZINE (APRESOLINE) 25 MG tablet Take 1 tablet (25 mg) by mouth every 8 hours 270 tablet 3     No current facility-administered medications for this visit.        Allergies     Allergies   Allergen Reactions    Codeine      Other reaction(s): GI intolerance  Verified       Medical / Surgical History     Past Medical History:   Diagnosis Date    Essential hypertension     Testicle cancer (H)     age 23     Past Surgical History:   Procedure Laterality Date    RADICAL ORCHIECTOMY Right     age 23    TONSILLECTOMY, ADENOIDECTOMY, COMBINED Bilateral     When he was about 32       Social History     Social History      Socioeconomic History    Marital status:      Spouse name: Not on file    Number of children: Not on file    Years of education: Not on file    Highest education level: Not on file   Occupational History    Not on file   Tobacco Use    Smoking status: Never     Passive exposure: Never    Smokeless tobacco: Current     Types: Chew    Tobacco comments:     Occ chew   Vaping Use    Vaping status: Never Used   Substance and Sexual Activity    Alcohol use: Not Currently    Drug use: Not Currently    Sexual activity: Yes   Other Topics Concern    Not on file   Social History Narrative    Not on file     Social Drivers of Health     Financial Resource Strain: Low Risk  (6/14/2024)    Financial Resource Strain     Within the past 12 months, have you or your family members you live with been unable to get utilities (heat, electricity) when it was really needed?: No   Food Insecurity: Low Risk  (6/14/2024)    Food Insecurity     Within the past 12 months, did you worry that your food would run out before you got money to buy more?: No     Within the past 12 months, did the food you bought just not last and you didn t have money to get more?: No   Transportation Needs: Low Risk  (6/14/2024)    Transportation Needs     Within the past 12 months, has lack of transportation kept you from medical appointments, getting your medicines, non-medical meetings or appointments, work, or from getting things that you need?: No   Physical Activity: Inactive (6/14/2024)    Exercise Vital Sign     Days of Exercise per Week: 0 days     Minutes of Exercise per Session: 0 min   Stress: No Stress Concern Present (6/14/2024)    [employer] - Occupational Stress Questionnaire     Feeling of Stress : Only a little   Social Connections: Unknown (6/14/2024)    Social Connection and Isolation Panel [NHANES]     Frequency of Communication with Friends and Family: Not on file     Frequency of Social Gatherings with  Friends and Family: Three times a week     Attends [employer] Services: Not on file     Active Member of Clubs or Organizations: Not on file     Attends Club or Organization Meetings: Not on file     Marital Status: Not on file   Interpersonal Safety: Low Risk  (6/14/2024)    Interpersonal Safety     Do you feel physically and emotionally safe where you currently live?: Yes     Within the past 12 months, have you been hit, slapped, kicked or otherwise physically hurt by someone?: No     Within the past 12 months, have you been humiliated or emotionally abused in other ways by your partner or ex-partner?: No   Housing Stability: Low Risk  (6/14/2024)    Housing Stability     Do you have housing? : Yes     Are you worried about losing your housing?: No       Family History     Family History   Problem Relation Age of Onset    Glasses (<7 y/o) Mother     Dementia Mother     Glasses (<7 y/o) Father     Heart Disease Father     Tuberculosis Maternal Grandfather     Heart Disease Paternal Grandmother     Cancer Paternal Grandfather        ROS     12 ROS completed, pertinent positive and negative in HPI    Physical Exam   There were no vitals taken for this visit.   GENERAL: alert and no distress  EYES: Eyes grossly normal to inspection.  No discharge or erythema, or obvious scleral/conjunctival abnormalities.  RESP: No audible wheeze, cough, or visible cyanosis.    SKIN: Visible skin clear. No significant rash, abnormal pigmentation or lesions.  NEURO: Cranial nerves grossly intact.  Mentation and speech appropriate for age.  PSYCH: Appropriate affect, tone, and pace of words     Labs/Imaging     Pertinent Labs were reviewed and discussed briefly.  Radiology Results were  reviewed and discussed briefly.    Summary of recent findings:   Lab Results   Component Value Date    A1C 5.6 06/14/2024    A1C 5.6 09/28/2022       TSH   Date Value Ref Range Status   07/07/2024 1.03 0.30 - 4.20 uIU/mL Final       Creatinine   Date  Value Ref Range Status   07/17/2024 1.41 (H) 0.67 - 1.17 mg/dL Final        Latest Ref Rn 7/7/2023  12:30 PM 6/14/2024  12:28 PM 7/6/2024  7:34 PM 7/7/2024  3:38 AM 7/7/2024  12:33 PM 7/7/2024  5:46 PM   ENDO ADRENAL LABS          Aldosterone 0.0 - 31.0 ng/dL     15.1     Cortisol Serum   ug/dL     8.3     Creatinine 0.67 - 1.17 mg/dL  1.18 (H)  1.20 (H)  1.19 (H)      Potassium Urine mmol/L mmol/L 32.8         Potassium 3.4 - 5.3 mmol/L  4.0  3.0 (L)  2.9 (L)  3.1 (L)  3.2 (L)    Potassium     2.9 (L)      Renin Activity ng/mL/hr     0.9     Sodium 135 - 145 mmol/L  139  142  141         Latest Ref Rn 7/8/2024  12:02 AM 7/8/2024  5:18 AM 7/9/2024  4:56 AM 7/17/2024  12:32 PM   ENDO ADRENAL LABS        Aldosterone 0.0 - 31.0 ng/dL       Cortisol Serum   ug/dL       Creatinine 0.67 - 1.17 mg/dL   1.20 (H)  1.41 (H)    Potassium Urine mmol/L mmol/L       Potassium 3.4 - 5.3 mmol/L 3.3 (L)  3.6  3.3 (L)  4.6    Renin Activity ng/mL/hr       Sodium 135 - 145 mmol/L   141  142       Legend:  (H) High  (L) Low    Dopamine (pmol/L)  <=240 pmol/L 194    Comment: INTERPRETIVE INFORMATION: Dopamine    Seated (15 min) less than or equal to 240 pmol/L  Supine (30 min) less than or equal to 240 pmol/L    Epinephrine (pmol/L)  <=330 pmol/L <55   Comment: INTERPRETIVE INFORMATION:Epinephrine    Seated (15 min) less than or equal to 330 pmol/L  Supine (30 min) less than or equal to 265 pmol/L    Norepinephrine (pmol/L)  1050 - 4800 pmol/L 6415 High    Comment: INTERPRETIVE INFORMATION: Norepinephrine    Seated (15 min) 1050 - 4800 pmol/L  Supine (30 min) 680 - 3100 pmol/L     Narrative & Impression   EXAM:  1. RENAL ULTRASOUND   2. RENAL DUPLEX  LOCATION: [facility]  DATE: 7/7/2024     INDICATION: uncontrolled HTN; eval. for renal artery stenosis.    COMPARISON: None.  TECHNIQUE: Duplex imaging is performed utilizing gray-scale, two-dimensional images, and color-flow imaging. Doppler waveform analysis and  spectral Doppler imaging is also performed.     FINDINGS:      RIGHT KIDNEY: 10 x 6.9 x 7.4 cm. Normal without hydronephrosis or masses.     LEFT KIDNEY 11.9 x 5.7 x 6.5 cm. Normal without hydronephrosis or masses..      BLADDER: Normal.     RENAL DUPLEX: Limited views of the renal arteries due to bowel gas/habitus.  Aortic PSV: 129 cm/s, multiphasic  Right Renal Artery PSV: 96 cm/s (Normal considered less than 200 cm/s.)  Right Intrarenal Resistive Index: Borderline, 0.7 to 0.8  Left Renal Artery PSV 94 cm/s (Normal considered less than 200 cm/s.)  Left Intrarenal Resistive Index: Borderline, 0.7 to 0.8                                                                      IMPRESSION:   1.  Limited imaging of the renal arteries due to bowel gas/habitus. No evidence of significant renal artery stenosis.     EXAM: MR ADRENAL W/O and W CONTRAST  LOCATION: [facility]  DATE: 8/20/2024     INDICATION: Hypertension. Hypokalemia. Elevated serum metanephrines. History of right testicular cancer at age 23 status post orchiectomy.  COMPARISON: 8/20/2024 MRI lumbar spine  TECHNIQUE: Routine MRI adrenal protocol including T1 in/out phase, diffusion, multiplane T2, and if done with contrast dynamic T1 post contrast.  CONTRAST: 18 ml Gadavist     FINDINGS:     ADRENALS: A circumscribed 1.4 x 1.3 x 1.0 cm fat-containing lesion in the right adrenal gland consistent with a benign myelolipoma. Adrenal glands are otherwise negative with no evidence of pheochromocytoma or adenoma.     HEPATOBILIARY: Mild diffuse hepatic steatosis. Liver otherwise normal. Normal biliary tree. No normal gallbladder.     PANCREAS: Normal.     SPLEEN: Spleen is mildly enlarged at 14 cm.     KIDNEYS: Several benign renal cysts. No follow up is needed. Kidneys are otherwise normal.     BOWEL: Duodenal and jejunal diverticula. Normal appendix. Bowel otherwise unremarkable. No ascites.     LYMPH NODES: No lymphadenopathy. Bilateral  para-aortic retroperitoneal foci of metallic dephasing artifact.     VASCULATURE: Normal caliber abdominal aorta and patent mesenteric and renal arteries.       MUSCULOSKELETAL: A 10 x 7 x 5 cm thin-walled peripherally enhancing cystic structure in the subcutaneous fat posterior midline low back is of uncertain etiology.                                                                      IMPRESSION:   1.  A 1.4 cm benign myelolipoma in the right adrenal gland requires no follow-up.   2.  Adrenal glands are otherwise negative with no evidence of pheochromocytoma or adenoma.  3.  Bilateral para-aortic retroperitoneal foci of metallic dephasing artifact could relate to retroperitoneal lymph node dissection related to patient's history of testicular cancer (correlate with surgical history).  4.  Mild splenic enlargement.  5.  Duodenal and jejunal diverticula.  6.  Mild diffuse hepatic steatosis.  7.  A 10 x 7 x 5 cm thin-walled peripherally enhancing cystic structure in the subcutaneous fat posterior midline low back is of uncertain etiology.       I personally reviewed the patient's outside records from [facility], Care Everywhere, and faxed records. Summary of pertinent findings in HPI.    Impression / Plan     1.  Resistant hypertension:  2.  Primary hyperaldosteronism:  History of hypertension for over 10 years.  Initially was well-controlled however over the last 6 months started to get episodes of very high blood pressure.  History of hypokalemia.  While on spironolactone 25 mg twice daily lab test showed potassium level of 3.1 renin activity of 0.9 aldosterone level of 15.1.  He was also found to have elevated plasma norepinephrine during the hospitalization in July 2024 level was 6415(4800) epinephrine was low undetectable, dopamine was normal 194.  No check of metanephrine or normetanephrine  MRI adrenal showed 1.4 cm right adrenal myolipoma.  Left adrenal normal.  No evidence of pheochromocytoma or  adenoma.    Impression: Resistant hypertension likely due to primary hyperaldosteronism.  No suspicion for pheochromocytoma given MRI findings.  Functional paraganglioma is also less likely.  Likely false elevation in norepinephrine due to sickness during the hospitalization will repeat assessment for catecholamines/metanephrines.    I explained to him today most probably he has primary hyperaldosteronism and options of the treatment of primary hyperaldosteronism which includes adrenalectomy for unilateral disease which is the superior option given the long-term benefit in reducing the complications in the future including cardiovascular complications and renal complications and discussed with the second option which is the medical treatment with aldosterone receptor antagonist he opted to follow the medical treatment.  Given his blood pressure currently is not well-controlled with his current regimen and the need of increasing his spironolactone we will hold on proceeding confirmatory testing and we will hold on getting the confirmatory testing for now however if he decides in the future he would like to proceed with surgical treatment we will be in need to proceed with the confirmatory testing prior to the next steps.    Plan:  -24-hour urine collection for catecholamines/metanephrines.  -Plasma metanephrine/catecholamines  -To increase spironolactone dose to 50 mg twice daily and to reduce lisinopril dose down to 10 mg daily.  -To get BMP and renin level check after 2 weeks.  -To start monitoring the blood pressure at home on daily basis and to send us the records of his blood pressure readings after 2 weeks.  -Goals for spironolactone adjustments to achieve good blood pressure control, resolution of hypokalemia without using of potassium supplements.        Test and/or medications prescribed today:  Orders Placed This Encounter   Procedures    Catecholamines fractioned free urine    Metanephrine random or 24 hr  urine    Metanephrines Plasma Free    Catecholamines Fractionated    Basic metabolic panel    Renin activity    Basic metabolic panel    Renin activity         Follow up: 3 months with a BMP prior to the visit    JESSE Parada  Endocrinology, Diabetes and Metabolism  [facility]    Note: Chart documentation done in part with Dragon Voice Recognition software. Although reviewed after completion, some word and grammatical errors may remain.  Please consider this when interpreting information in this chart

## 2024-12-09 ENCOUNTER — TELEPHONE (OUTPATIENT)
Dept: ENDOCRINOLOGY | Facility: CLINIC | Age: 64
End: 2024-12-09
Payer: COMMERCIAL

## 2024-12-09 NOTE — TELEPHONE ENCOUNTER
Patient confirmed scheduled appointment:  Date: 25  Time: 3pm  Visit type: RETURN ENDOCRINE  Provider: JESSE Parada  Location: [facility]  Testing/imaginhr urine collection  Additional notes: Virtual

## 2025-05-12 ENCOUNTER — VIRTUAL VISIT (OUTPATIENT)
Dept: SURGERY | Facility: CLINIC | Age: 65
End: 2025-05-12
Payer: COMMERCIAL

## 2025-05-12 VITALS — BODY MASS INDEX: 42.66 KG/M2 | HEIGHT: 72 IN | WEIGHT: 315 LBS

## 2025-05-12 DIAGNOSIS — G47.33 OBSTRUCTIVE SLEEP APNEA SYNDROME: ICD-10-CM

## 2025-05-12 DIAGNOSIS — I10 PRIMARY HYPERTENSION: ICD-10-CM

## 2025-05-12 DIAGNOSIS — E66.813 CLASS 3 SEVERE OBESITY DUE TO EXCESS CALORIES WITH BODY MASS INDEX (BMI) OF 50.0 TO 59.9 IN ADULT, UNSPECIFIED WHETHER SERIOUS COMORBIDITY PRESENT (H): Primary | ICD-10-CM

## 2025-05-12 PROCEDURE — 98006 SYNCH AUDIO-VIDEO EST MOD 30: CPT | Performed by: FAMILY MEDICINE

## 2025-05-12 PROCEDURE — 1125F AMNT PAIN NOTED PAIN PRSNT: CPT | Mod: 95 | Performed by: FAMILY MEDICINE

## 2025-05-12 ASSESSMENT — PAIN SCALES - GENERAL: PAINLEVEL_OUTOF10: MODERATE PAIN (4)

## 2025-05-12 NOTE — PROGRESS NOTES
[facility] Non Surgical Follow up Visit   Date of visit: 5/12/2025  Physician: QUINTIN Olea MD, MD  Primary Care is Mesha Rapp.  Stephen Vyas   64 year old  male     Initial Weight: 390#  Initial BMI: 52.89  Today's Weight:   Wt Readings from Last 1 Encounters:   05/12/25 (!) 181.4 kg (400 lb)     Body mass index is 54.25 kg/m .           Assessment and Plan   Assessment: Stephen is a 64 year old year old male who presents for medical weight management.      Plan:    1. Class 3 severe obesity due to excess calories with body mass index (BMI) of 50.0 to 59.9 in adult, unspecified whether serious comorbidity present (H) (Primary)  Patient was congratulated on his success thus far. Healthy habits to assist with further weight loss were discussed. He is going to focus eating more protein and vegetables and will try to start riding his exercise bike and do hand weights. He would like to start zepbound.  Risks, benefits and possible side effects discussed  If it is not covered by his insurance he would be a candidate for phentermine. We discussed the patient's co-morbid conditions including hypertension and KAY. These likely will improve with healthy habits and weight loss.     2. Primary hypertension  This may improve with healthy habits and weight loss.     3. Obstructive sleep apnea syndrome  This may improve with healthy habits and weight loss.      Follow up next available with myself            INTERIM HISTORY  Patient is working on healthy habits for weight loss. He has been lost to follow up since July. He ends up having lots of gatherings around food which often makes it difficult to eat healthy. He often works 12 hour days.    Goals established by patient with dietician 7/22/25:   Eat a breakfast daily  3 cup of water per day (1 cup=24oz)       DIETARY HISTORY  Meals Per Day: 3  Eating Protein First?: sometimes  Food Diary: B:fast food breakfast sandwich or belvita cookie  L:sandwich and fruit  "and snack bag of chips D:often social events at work  Snacks Per Day: at work  Typical Snack: \"unhealthy\"  Fluid Intake: working on it  Portion Control: no  Calorie Containing Beverages: none    Meals at Restaurant per week:7-12    Positive Changes Since Last Visit: more water, added breakfast, limiting fast food and sweets at times then goes back to old habits  Struggling With: joint pain    Knowledgeable in Reading Food Labels: not sure  Getting Adequate Protein: no      PHYSICAL ACTIVITY PATTERNS:  Limited due to back pain    REVIEW OF SYSTEMS  GENERAL/CONSTITUTIONAL:  Fatigue: sometimes  HEENT:   glaucoma: no  CARDIOVASCULAR:  History of heart disease: no  GI:  Pancreatitis: no  NEUROLOGIC:  History of migraine headaches: history of  PSYCHIATRIC:  Moods: stable  MUSCULOSKELETAL/RHEUMATOLOGIC  Arthralgias: yes  Myalgias: yes  ENDOCRINE:  Monitoring Blood Sugars: no  Sugars Well Controlled: na  No personal or family history of medullary thyroid cancer no  No personal or family history of MEN2   :  Birth control: male  History of kidney stones: yes     Patient Profile   Social History     Social History Narrative    Not on file        Past Medical History   Past Medical History:   Diagnosis Date    Essential hypertension     Testicle cancer (H)     age 23     Patient Active Problem List   Diagnosis    Primary hypertension    Presbyopia    Hypokalemia    Lipids abnormal    Lumbar radiculopathy    Class 3 severe obesity due to excess calories with body mass index (BMI) of 50.0 to 59.9 in adult, unspecified whether serious comorbidity present (H)    Obstructive sleep apnea syndrome    Pain in back    Spondylosis of lumbar spine    Status post orchiectomy    Mixed hyperlipidemia    Bilateral impacted cerumen    Dizziness    Pre-syncope       Past Surgical History  He has a past surgical history that includes Tonsillectomy, adenoidectomy, combined (Bilateral) and Radical Orchiectomy (Right).     Examination   Ht 1.829 " m (6')   Wt (!) 181.4 kg (400 lb)   BMI 54.25 kg/m    Wt Readings from Last 4 Encounters:   05/12/25 (!) 181.4 kg (400 lb)   10/17/24 (!) 172.4 kg (380 lb)   10/09/24 (!) 174.6 kg (385 lb)   07/25/24 (!) 180.1 kg (397 lb)      BP Readings from Last 3 Encounters:   10/09/24 124/78   07/25/24 126/74   07/17/24 127/83      GENERAL: alert and no distress  EYES: Eyes grossly normal to inspection.  No discharge or erythema, or obvious scleral/conjunctival abnormalities.  RESP: No audible wheeze, cough, or visible cyanosis.    SKIN: Visible skin clear. No significant rash, abnormal pigmentation or lesions.  NEURO: Cranial nerves grossly intact.  Mentation and speech appropriate for age.  PSYCH: Appropriate affect, tone, and pace of words        Counseling:   We reviewed the important post op bariatric recommendations:  -eating 3 meals daily  -eating protein first, getting >60gm protein daily  -eating slowly, chewing food well  -avoiding/limiting calorie containing beverages  -limiting starchy vegetables and carbohydrates, choosing wheat, not white with breads,   crackers, pastas, laureen, bagels, tortillas, rice  -limiting restaurant or cafeteria eating to twice a week or less    We discussed the importance of restorative sleep and stress management in maintaining a healthy weight.  We discussed the National Weight Control Registry healthy weight maintenance strategies and ways to optimize metabolism.  We discussed the importance of physical activity including cardiovascular and strength training in maintaining a healthier weight.    Total time spent on the date of this encounter doing: chart review, review of test results, patient visit, physical exam, education, counseling, developing plan of care and documenting = 37 minutes.         QUINTIN Olea MD  [facility]

## 2025-05-12 NOTE — PROGRESS NOTES
Virtual Visit Details    Type of service:  Video Visit     Originating Location (pt. Location): Other at work in [place]    Distant Location (provider location):  Off-site  Platform used for Video Visit: Tommie  Video start time: 1:50 pm  Video end time: 2:18 pm

## 2025-05-12 NOTE — NURSING NOTE
Current patient location: work     Is the patient currently in the state of [place]? YES    Visit mode: VIDEO    If the visit is dropped, the patient can be reconnected by:VIDEO VISIT: Text to cell phone:   Telephone Information:   Mobile [phone]       Will anyone else be joining the visit? NO  (If patient encounters technical issues they should call [phone] :[phone])    Are changes needed to the allergy or medication list? Pt stated no changes to allergies and Pt stated no med changes    Are refills needed on medications prescribed by this physician? discuss    Rooming Documentation:  Questionnaire(s) completed      Reason for visit: RECHECK    Wt other than 24 hrs:  month ago   Pain more than one location:  chronic pain   Talita CHAOF

## 2025-05-12 NOTE — LETTER
5/12/2025      Stephen Vyas  [address]  [facility] [address]      Dear Colleague,    Thank you for referring your patient, Stephen Vyas, to the [facility] [place]. Please see a copy of my visit note below.    [facility] Care Clinic Non Surgical Follow up Visit   Date of visit: 5/12/2025  Physician: QUINTIN Olea MD, MD  Primary Care is Mesha Rapp.  Stephen Vyas   64 year old  male     Initial Weight: 390#  Initial BMI: 52.89  Today's Weight:   Wt Readings from Last 1 Encounters:   05/12/25 (!) 181.4 kg (400 lb)     Body mass index is 54.25 kg/m .           Assessment and Plan   Assessment: Stephen is a 64 year old year old male who presents for medical weight management.      Plan:    1. Class 3 severe obesity due to excess calories with body mass index (BMI) of 50.0 to 59.9 in adult, unspecified whether serious comorbidity present (H) (Primary)  Patient was congratulated on his success thus far. Healthy habits to assist with further weight loss were discussed. He is going to focus eating more protein and vegetables and will try to start riding his exercise bike and do hand weights. He would like to start zepbound.  Risks, benefits and possible side effects discussed  If it is not covered by his insurance he would be a candidate for phentermine. We discussed the patient's co-morbid conditions including hypertension and KAY. These likely will improve with healthy habits and weight loss.     2. Primary hypertension  This may improve with healthy habits and weight loss.     3. Obstructive sleep apnea syndrome  This may improve with healthy habits and weight loss.      Follow up next available with myself            INTERIM HISTORY  Patient is working on healthy habits for weight loss. He has been lost to follow up since July. He ends up having lots of gatherings around food which often makes it difficult to eat healthy. He often works 12 hour days.    Goals  "established by patient with dietician 7/22/25:   Eat a breakfast daily  3 cup of water per day (1 cup=24oz)       DIETARY HISTORY  Meals Per Day: 3  Eating Protein First?: sometimes  Food Diary: B:fast food breakfast sandwich or belvita cookie  L:sandwich and fruit and snack bag of chips D:often social events at work  Snacks Per Day: at work  Typical Snack: \"unhealthy\"  Fluid Intake: working on it  Portion Control: no  Calorie Containing Beverages: none    Meals at Restaurant per week:7-12    Positive Changes Since Last Visit: more water, added breakfast, limiting fast food and sweets at times then goes back to old habits  Struggling With: joint pain    Knowledgeable in Reading Food Labels: not sure  Getting Adequate Protein: no      PHYSICAL ACTIVITY PATTERNS:  Limited due to back pain    REVIEW OF SYSTEMS  GENERAL/CONSTITUTIONAL:  Fatigue: sometimes  HEENT:   glaucoma: no  CARDIOVASCULAR:  History of heart disease: no  GI:  Pancreatitis: no  NEUROLOGIC:  History of migraine headaches: history of  PSYCHIATRIC:  Moods: stable  MUSCULOSKELETAL/RHEUMATOLOGIC  Arthralgias: yes  Myalgias: yes  ENDOCRINE:  Monitoring Blood Sugars: no  Sugars Well Controlled: na  No personal or family history of medullary thyroid cancer no  No personal or family history of MEN2   :  Birth control: male  History of kidney stones: yes     Patient Profile   Social History     Social History Narrative     Not on file        Past Medical History   Past Medical History:   Diagnosis Date     Essential hypertension      Testicle cancer (H)     age 23     Patient Active Problem List   Diagnosis     Primary hypertension     Presbyopia     Hypokalemia     Lipids abnormal     Lumbar radiculopathy     Class 3 severe obesity due to excess calories with body mass index (BMI) of 50.0 to 59.9 in adult, unspecified whether serious comorbidity present (H)     Obstructive sleep apnea syndrome     Pain in back     Spondylosis of lumbar spine     Status post " orchiectomy     Mixed hyperlipidemia     Bilateral impacted cerumen     Dizziness     Pre-syncope       Past Surgical History  He has a past surgical history that includes Tonsillectomy, adenoidectomy, combined (Bilateral) and Radical Orchiectomy (Right).     Examination   Ht 1.829 m (6')   Wt (!) 181.4 kg (400 lb)   BMI 54.25 kg/m    Wt Readings from Last 4 Encounters:   05/12/25 (!) 181.4 kg (400 lb)   10/17/24 (!) 172.4 kg (380 lb)   10/09/24 (!) 174.6 kg (385 lb)   07/25/24 (!) 180.1 kg (397 lb)      BP Readings from Last 3 Encounters:   10/09/24 124/78   07/25/24 126/74   07/17/24 127/83      GENERAL: alert and no distress  EYES: Eyes grossly normal to inspection.  No discharge or erythema, or obvious scleral/conjunctival abnormalities.  RESP: No audible wheeze, cough, or visible cyanosis.    SKIN: Visible skin clear. No significant rash, abnormal pigmentation or lesions.  NEURO: Cranial nerves grossly intact.  Mentation and speech appropriate for age.  PSYCH: Appropriate affect, tone, and pace of words        Counseling:   We reviewed the important post op bariatric recommendations:  -eating 3 meals daily  -eating protein first, getting >60gm protein daily  -eating slowly, chewing food well  -avoiding/limiting calorie containing beverages  -limiting starchy vegetables and carbohydrates, choosing wheat, not white with breads,   crackers, pastas, laureen, bagels, tortillas, rice  -limiting restaurant or cafeteria eating to twice a week or less    We discussed the importance of restorative sleep and stress management in maintaining a healthy weight.  We discussed the National Weight Control Registry healthy weight maintenance strategies and ways to optimize metabolism.  We discussed the importance of physical activity including cardiovascular and strength training in maintaining a healthier weight.    Total time spent on the date of this encounter doing: chart review, review of test results, patient visit,  physical exam, education, counseling, developing plan of care and documenting = 37 minutes.         QUINTIN Olea MD  [facility]           Virtual Visit Details    Type of service:  Video Visit     Originating Location (pt. Location): Other at work in [place]    Distant Location (provider location):  Off-site  Platform used for Video Visit: MAINtag  Video start time: 1:50 pm  Video end time: 2:18 pm       Again, thank you for allowing me to participate in the care of your patient.        Sincerely,        QUINTIN Olea MD    Electronically signed

## 2025-05-15 ENCOUNTER — PATIENT OUTREACH (OUTPATIENT)
Dept: CARE COORDINATION | Facility: CLINIC | Age: 65
End: 2025-05-15
Payer: COMMERCIAL

## 2025-05-29 ENCOUNTER — PATIENT OUTREACH (OUTPATIENT)
Dept: CARE COORDINATION | Facility: CLINIC | Age: 65
End: 2025-05-29
Payer: COMMERCIAL

## 2025-06-26 DIAGNOSIS — E66.813 CLASS 3 SEVERE OBESITY DUE TO EXCESS CALORIES WITH BODY MASS INDEX (BMI) OF 50.0 TO 59.9 IN ADULT, UNSPECIFIED WHETHER SERIOUS COMORBIDITY PRESENT (H): ICD-10-CM

## 2025-07-09 DIAGNOSIS — I10 PRIMARY HYPERTENSION: ICD-10-CM

## 2025-07-09 RX ORDER — AMLODIPINE BESYLATE 10 MG/1
10 TABLET ORAL DAILY
Qty: 90 TABLET | Refills: 0 | Status: SHIPPED | OUTPATIENT
Start: 2025-07-09

## 2025-07-09 RX ORDER — AMLODIPINE BESYLATE 10 MG/1
10 TABLET ORAL DAILY
Qty: 90 TABLET | Refills: 3 | OUTPATIENT
Start: 2025-07-09

## 2025-08-20 DIAGNOSIS — E78.2 MIXED HYPERLIPIDEMIA: ICD-10-CM

## 2025-08-20 RX ORDER — EZETIMIBE 10 MG/1
10 TABLET ORAL DAILY
Qty: 90 TABLET | Refills: 3 | Status: SHIPPED | OUTPATIENT
Start: 2025-08-20